# Patient Record
Sex: MALE | Race: BLACK OR AFRICAN AMERICAN | NOT HISPANIC OR LATINO | Employment: FULL TIME | URBAN - METROPOLITAN AREA
[De-identification: names, ages, dates, MRNs, and addresses within clinical notes are randomized per-mention and may not be internally consistent; named-entity substitution may affect disease eponyms.]

---

## 2019-02-15 ENCOUNTER — TRANSFERRED RECORDS (OUTPATIENT)
Dept: HEALTH INFORMATION MANAGEMENT | Facility: CLINIC | Age: 49
End: 2019-02-15

## 2019-02-25 ENCOUNTER — TRANSFERRED RECORDS (OUTPATIENT)
Dept: HEALTH INFORMATION MANAGEMENT | Facility: CLINIC | Age: 49
End: 2019-02-25

## 2019-02-26 ENCOUNTER — APPOINTMENT (OUTPATIENT)
Dept: GENERAL RADIOLOGY | Facility: CLINIC | Age: 49
End: 2019-02-26
Attending: EMERGENCY MEDICINE
Payer: COMMERCIAL

## 2019-02-26 ENCOUNTER — SURGERY (OUTPATIENT)
Age: 49
End: 2019-02-26
Payer: COMMERCIAL

## 2019-02-26 ENCOUNTER — HOSPITAL ENCOUNTER (INPATIENT)
Facility: CLINIC | Age: 49
LOS: 3 days | Discharge: HOME | End: 2019-03-01
Attending: EMERGENCY MEDICINE | Admitting: INTERNAL MEDICINE
Payer: COMMERCIAL

## 2019-02-26 ENCOUNTER — APPOINTMENT (OUTPATIENT)
Dept: GENERAL RADIOLOGY | Facility: CLINIC | Age: 49
End: 2019-02-26
Attending: PHYSICIAN ASSISTANT
Payer: COMMERCIAL

## 2019-02-26 ENCOUNTER — TRANSFERRED RECORDS (OUTPATIENT)
Dept: HEALTH INFORMATION MANAGEMENT | Facility: CLINIC | Age: 49
End: 2019-02-26

## 2019-02-26 DIAGNOSIS — I21.3 ST ELEVATION MYOCARDIAL INFARCTION (STEMI), UNSPECIFIED ARTERY (H): ICD-10-CM

## 2019-02-26 LAB
ANION GAP SERPL CALCULATED.3IONS-SCNC: 8 MMOL/L (ref 3–14)
APTT PPP: 26 SEC (ref 22–37)
BASOPHILS # BLD AUTO: 0 10E9/L (ref 0–0.2)
BASOPHILS NFR BLD AUTO: 0.1 %
BUN SERPL-MCNC: 10 MG/DL (ref 7–30)
CALCIUM SERPL-MCNC: 9.2 MG/DL (ref 8.5–10.1)
CHLORIDE SERPL-SCNC: 103 MMOL/L (ref 94–109)
CHOLEST SERPL-MCNC: 178 MG/DL
CO2 SERPL-SCNC: 26 MMOL/L (ref 20–32)
CREAT SERPL-MCNC: 1.01 MG/DL (ref 0.66–1.25)
DIFFERENTIAL METHOD BLD: ABNORMAL
EOSINOPHIL # BLD AUTO: 0.1 10E9/L (ref 0–0.7)
EOSINOPHIL NFR BLD AUTO: 0.4 %
ERYTHROCYTE [DISTWIDTH] IN BLOOD BY AUTOMATED COUNT: 15.4 % (ref 10–15)
ERYTHROCYTE [DISTWIDTH] IN BLOOD BY AUTOMATED COUNT: 15.4 % (ref 10–15)
GFR SERPL CREATININE-BSD FRML MDRD: 87 ML/MIN/{1.73_M2}
GLUCOSE SERPL-MCNC: 118 MG/DL (ref 70–99)
HCT VFR BLD AUTO: 39.7 % (ref 40–53)
HCT VFR BLD AUTO: 40.7 % (ref 40–53)
HDLC SERPL-MCNC: 58 MG/DL
HGB BLD-MCNC: 13 G/DL (ref 13.3–17.7)
HGB BLD-MCNC: 13.2 G/DL (ref 13.3–17.7)
IMM GRANULOCYTES # BLD: 0.1 10E9/L (ref 0–0.4)
IMM GRANULOCYTES NFR BLD: 0.3 %
INR PPP: 1.01 (ref 0.86–1.14)
INTERPRETATION ECG - MUSE: NORMAL
LACTATE BLD-SCNC: 5.4 MMOL/L (ref 0.7–2)
LDLC SERPL CALC-MCNC: 110 MG/DL
LMWH PPP CHRO-ACNC: 0.38 IU/ML
LYMPHOCYTES # BLD AUTO: 3.3 10E9/L (ref 0.8–5.3)
LYMPHOCYTES NFR BLD AUTO: 18.7 %
MCH RBC QN AUTO: 23.9 PG (ref 26.5–33)
MCH RBC QN AUTO: 24 PG (ref 26.5–33)
MCHC RBC AUTO-ENTMCNC: 32.4 G/DL (ref 31.5–36.5)
MCHC RBC AUTO-ENTMCNC: 32.7 G/DL (ref 31.5–36.5)
MCV RBC AUTO: 73 FL (ref 78–100)
MCV RBC AUTO: 74 FL (ref 78–100)
MONOCYTES # BLD AUTO: 0.3 10E9/L (ref 0–1.3)
MONOCYTES NFR BLD AUTO: 1.6 %
NEUTROPHILS # BLD AUTO: 13.7 10E9/L (ref 1.6–8.3)
NEUTROPHILS NFR BLD AUTO: 78.9 %
NONHDLC SERPL-MCNC: 120 MG/DL
NRBC # BLD AUTO: 0 10*3/UL
NRBC BLD AUTO-RTO: 0 /100
PLATELET # BLD AUTO: 267 10E9/L (ref 150–450)
PLATELET # BLD AUTO: 271 10E9/L (ref 150–450)
POTASSIUM SERPL-SCNC: 4.3 MMOL/L (ref 3.4–5.3)
RBC # BLD AUTO: 5.41 10E12/L (ref 4.4–5.9)
RBC # BLD AUTO: 5.52 10E12/L (ref 4.4–5.9)
SODIUM SERPL-SCNC: 137 MMOL/L (ref 133–144)
TRIGL SERPL-MCNC: 52 MG/DL
TROPONIN I SERPL-MCNC: 0.03 UG/L (ref 0–0.04)
TROPONIN I SERPL-MCNC: 119.97 UG/L (ref 0–0.04)
WBC # BLD AUTO: 17.4 10E9/L (ref 4–11)
WBC # BLD AUTO: 18.6 10E9/L (ref 4–11)

## 2019-02-26 PROCEDURE — 25000125 ZZHC RX 250: Performed by: NURSE PRACTITIONER

## 2019-02-26 PROCEDURE — C1725 CATH, TRANSLUMIN NON-LASER: HCPCS | Performed by: INTERNAL MEDICINE

## 2019-02-26 PROCEDURE — 93005 ELECTROCARDIOGRAM TRACING: CPT

## 2019-02-26 PROCEDURE — 84484 ASSAY OF TROPONIN QUANT: CPT | Performed by: INTERNAL MEDICINE

## 2019-02-26 PROCEDURE — 85018 HEMOGLOBIN: CPT | Performed by: INTERNAL MEDICINE

## 2019-02-26 PROCEDURE — 85730 THROMBOPLASTIN TIME PARTIAL: CPT | Performed by: EMERGENCY MEDICINE

## 2019-02-26 PROCEDURE — 99291 CRITICAL CARE FIRST HOUR: CPT | Mod: 25 | Performed by: INTERNAL MEDICINE

## 2019-02-26 PROCEDURE — 25000132 ZZH RX MED GY IP 250 OP 250 PS 637: Performed by: INTERNAL MEDICINE

## 2019-02-26 PROCEDURE — C1769 GUIDE WIRE: HCPCS | Performed by: INTERNAL MEDICINE

## 2019-02-26 PROCEDURE — 93454 CORONARY ARTERY ANGIO S&I: CPT | Performed by: INTERNAL MEDICINE

## 2019-02-26 PROCEDURE — 25800030 ZZH RX IP 258 OP 636: Performed by: INTERNAL MEDICINE

## 2019-02-26 PROCEDURE — 21000001 ZZH R&B HEART CARE

## 2019-02-26 PROCEDURE — 71045 X-RAY EXAM CHEST 1 VIEW: CPT

## 2019-02-26 PROCEDURE — 85347 COAGULATION TIME ACTIVATED: CPT

## 2019-02-26 PROCEDURE — 73523 X-RAY EXAM HIPS BI 5/> VIEWS: CPT

## 2019-02-26 PROCEDURE — 80061 LIPID PANEL: CPT | Performed by: INTERNAL MEDICINE

## 2019-02-26 PROCEDURE — 92920 PRQ TRLUML C ANGIOP 1ART&/BR: CPT | Performed by: INTERNAL MEDICINE

## 2019-02-26 PROCEDURE — 36415 COLL VENOUS BLD VENIPUNCTURE: CPT | Performed by: INTERNAL MEDICINE

## 2019-02-26 PROCEDURE — C1887 CATHETER, GUIDING: HCPCS | Performed by: INTERNAL MEDICINE

## 2019-02-26 PROCEDURE — 85025 COMPLETE CBC W/AUTO DIFF WBC: CPT | Performed by: EMERGENCY MEDICINE

## 2019-02-26 PROCEDURE — 27210794 ZZH OR GENERAL SUPPLY STERILE: Performed by: INTERNAL MEDICINE

## 2019-02-26 PROCEDURE — 85520 HEPARIN ASSAY: CPT | Performed by: INTERNAL MEDICINE

## 2019-02-26 PROCEDURE — 02703ZZ DILATION OF CORONARY ARTERY, ONE ARTERY, PERCUTANEOUS APPROACH: ICD-10-PCS | Performed by: INTERNAL MEDICINE

## 2019-02-26 PROCEDURE — B2111ZZ FLUOROSCOPY OF MULTIPLE CORONARY ARTERIES USING LOW OSMOLAR CONTRAST: ICD-10-PCS | Performed by: INTERNAL MEDICINE

## 2019-02-26 PROCEDURE — 99223 1ST HOSP IP/OBS HIGH 75: CPT | Mod: AI | Performed by: INTERNAL MEDICINE

## 2019-02-26 PROCEDURE — 02C03ZZ EXTIRPATION OF MATTER FROM CORONARY ARTERY, ONE ARTERY, PERCUTANEOUS APPROACH: ICD-10-PCS | Performed by: INTERNAL MEDICINE

## 2019-02-26 PROCEDURE — 85610 PROTHROMBIN TIME: CPT | Performed by: EMERGENCY MEDICINE

## 2019-02-26 PROCEDURE — 84484 ASSAY OF TROPONIN QUANT: CPT | Performed by: EMERGENCY MEDICINE

## 2019-02-26 PROCEDURE — 85027 COMPLETE CBC AUTOMATED: CPT | Performed by: INTERNAL MEDICINE

## 2019-02-26 PROCEDURE — 83605 ASSAY OF LACTIC ACID: CPT | Performed by: INTERNAL MEDICINE

## 2019-02-26 PROCEDURE — 80048 BASIC METABOLIC PNL TOTAL CA: CPT | Performed by: EMERGENCY MEDICINE

## 2019-02-26 PROCEDURE — 99152 MOD SED SAME PHYS/QHP 5/>YRS: CPT | Mod: GC | Performed by: INTERNAL MEDICINE

## 2019-02-26 PROCEDURE — 93010 ELECTROCARDIOGRAM REPORT: CPT | Performed by: INTERNAL MEDICINE

## 2019-02-26 PROCEDURE — 96374 THER/PROPH/DIAG INJ IV PUSH: CPT

## 2019-02-26 PROCEDURE — 92941 PRQ TRLML REVSC TOT OCCL AMI: CPT | Mod: LC | Performed by: INTERNAL MEDICINE

## 2019-02-26 PROCEDURE — 93454 CORONARY ARTERY ANGIO S&I: CPT | Mod: 26 | Performed by: INTERNAL MEDICINE

## 2019-02-26 PROCEDURE — 92973 PRQ TRLUML C MCHN ASP THRMBC: CPT | Performed by: INTERNAL MEDICINE

## 2019-02-26 PROCEDURE — 99285 EMERGENCY DEPT VISIT HI MDM: CPT | Mod: 25

## 2019-02-26 PROCEDURE — 25000128 H RX IP 250 OP 636: Performed by: INTERNAL MEDICINE

## 2019-02-26 PROCEDURE — 25000128 H RX IP 250 OP 636

## 2019-02-26 PROCEDURE — 25000125 ZZHC RX 250: Performed by: INTERNAL MEDICINE

## 2019-02-26 PROCEDURE — C1894 INTRO/SHEATH, NON-LASER: HCPCS | Performed by: INTERNAL MEDICINE

## 2019-02-26 PROCEDURE — 99153 MOD SED SAME PHYS/QHP EA: CPT | Performed by: INTERNAL MEDICINE

## 2019-02-26 PROCEDURE — 99152 MOD SED SAME PHYS/QHP 5/>YRS: CPT | Performed by: INTERNAL MEDICINE

## 2019-02-26 PROCEDURE — C1726 CATH, BAL DIL, NON-VASCULAR: HCPCS | Performed by: INTERNAL MEDICINE

## 2019-02-26 PROCEDURE — C1760 CLOSURE DEV, VASC: HCPCS | Performed by: INTERNAL MEDICINE

## 2019-02-26 RX ORDER — POTASSIUM CHLORIDE 29.8 MG/ML
20 INJECTION INTRAVENOUS
Status: DISCONTINUED | OUTPATIENT
Start: 2019-02-26 | End: 2019-03-01 | Stop reason: HOSPADM

## 2019-02-26 RX ORDER — POTASSIUM CHLORIDE 1.5 G/1.58G
20-40 POWDER, FOR SOLUTION ORAL
Status: DISCONTINUED | OUTPATIENT
Start: 2019-02-26 | End: 2019-03-01 | Stop reason: HOSPADM

## 2019-02-26 RX ORDER — CLOPIDOGREL BISULFATE 75 MG/1
TABLET ORAL
Status: DISCONTINUED | OUTPATIENT
Start: 2019-02-26 | End: 2019-02-26 | Stop reason: HOSPADM

## 2019-02-26 RX ORDER — HYDROMORPHONE HYDROCHLORIDE 1 MG/ML
.3-.5 INJECTION, SOLUTION INTRAMUSCULAR; INTRAVENOUS; SUBCUTANEOUS
Status: DISCONTINUED | OUTPATIENT
Start: 2019-02-26 | End: 2019-02-27

## 2019-02-26 RX ORDER — CLOPIDOGREL BISULFATE 75 MG/1
75 TABLET ORAL DAILY
Status: DISCONTINUED | OUTPATIENT
Start: 2019-02-27 | End: 2019-03-01 | Stop reason: HOSPADM

## 2019-02-26 RX ORDER — FENTANYL CITRATE 50 UG/ML
INJECTION, SOLUTION INTRAMUSCULAR; INTRAVENOUS
Status: DISCONTINUED | OUTPATIENT
Start: 2019-02-26 | End: 2019-02-26 | Stop reason: HOSPADM

## 2019-02-26 RX ORDER — MAGNESIUM SULFATE HEPTAHYDRATE 40 MG/ML
2 INJECTION, SOLUTION INTRAVENOUS ONCE
Status: COMPLETED | OUTPATIENT
Start: 2019-02-27 | End: 2019-02-27

## 2019-02-26 RX ORDER — HYDROCODONE BITARTRATE AND ACETAMINOPHEN 5; 325 MG/1; MG/1
1-2 TABLET ORAL EVERY 4 HOURS PRN
Status: DISCONTINUED | OUTPATIENT
Start: 2019-02-26 | End: 2019-02-27

## 2019-02-26 RX ORDER — SODIUM CHLORIDE 9 MG/ML
INJECTION, SOLUTION INTRAVENOUS CONTINUOUS
Status: DISCONTINUED | OUTPATIENT
Start: 2019-02-26 | End: 2019-02-28

## 2019-02-26 RX ORDER — FLUMAZENIL 0.1 MG/ML
0.2 INJECTION, SOLUTION INTRAVENOUS
Status: ACTIVE | OUTPATIENT
Start: 2019-02-26 | End: 2019-02-27

## 2019-02-26 RX ORDER — METOPROLOL SUCCINATE 25 MG/1
25 TABLET, EXTENDED RELEASE ORAL DAILY
Status: DISCONTINUED | OUTPATIENT
Start: 2019-02-27 | End: 2019-02-27

## 2019-02-26 RX ORDER — NALOXONE HYDROCHLORIDE 0.4 MG/ML
.1-.4 INJECTION, SOLUTION INTRAMUSCULAR; INTRAVENOUS; SUBCUTANEOUS
Status: DISCONTINUED | OUTPATIENT
Start: 2019-02-26 | End: 2019-02-27

## 2019-02-26 RX ORDER — HEPARIN SODIUM 1000 [USP'U]/ML
INJECTION, SOLUTION INTRAVENOUS; SUBCUTANEOUS
Status: DISCONTINUED | OUTPATIENT
Start: 2019-02-26 | End: 2019-02-26 | Stop reason: HOSPADM

## 2019-02-26 RX ORDER — CALCIUM CARBONATE 500 MG/1
1000 TABLET, CHEWABLE ORAL 4 TIMES DAILY PRN
Status: DISCONTINUED | OUTPATIENT
Start: 2019-02-26 | End: 2019-03-01 | Stop reason: HOSPADM

## 2019-02-26 RX ORDER — KETAMINE HCL IN NACL, ISO-OSM 100MG/10ML
0.2 SYRINGE (ML) INJECTION ONCE
Status: COMPLETED | OUTPATIENT
Start: 2019-02-26 | End: 2019-02-27

## 2019-02-26 RX ORDER — AMLODIPINE BESYLATE 10 MG/1
10 TABLET ORAL DAILY
Status: DISCONTINUED | OUTPATIENT
Start: 2019-02-27 | End: 2019-03-01 | Stop reason: HOSPADM

## 2019-02-26 RX ORDER — LOSARTAN POTASSIUM AND HYDROCHLOROTHIAZIDE 25; 100 MG/1; MG/1
1 TABLET ORAL DAILY
Status: DISCONTINUED | OUTPATIENT
Start: 2019-02-27 | End: 2019-03-01 | Stop reason: HOSPADM

## 2019-02-26 RX ORDER — ARGATROBAN 1 MG/ML
150 INJECTION, SOLUTION INTRAVENOUS
Status: DISCONTINUED | OUTPATIENT
Start: 2019-02-26 | End: 2019-02-26 | Stop reason: HOSPADM

## 2019-02-26 RX ORDER — AMLODIPINE BESYLATE 10 MG/1
10 TABLET ORAL DAILY
COMMUNITY
End: 2020-06-04

## 2019-02-26 RX ORDER — POLYETHYLENE GLYCOL 3350 17 G/17G
17 POWDER, FOR SOLUTION ORAL DAILY PRN
Status: DISCONTINUED | OUTPATIENT
Start: 2019-02-26 | End: 2019-03-01 | Stop reason: HOSPADM

## 2019-02-26 RX ORDER — NALOXONE HYDROCHLORIDE 0.4 MG/ML
.2-.4 INJECTION, SOLUTION INTRAMUSCULAR; INTRAVENOUS; SUBCUTANEOUS
Status: DISCONTINUED | OUTPATIENT
Start: 2019-02-26 | End: 2019-02-26

## 2019-02-26 RX ORDER — ARGATROBAN 1 MG/ML
350 INJECTION, SOLUTION INTRAVENOUS
Status: DISCONTINUED | OUTPATIENT
Start: 2019-02-26 | End: 2019-02-26 | Stop reason: HOSPADM

## 2019-02-26 RX ORDER — LIDOCAINE 40 MG/G
CREAM TOPICAL
Status: DISCONTINUED | OUTPATIENT
Start: 2019-02-26 | End: 2019-03-01 | Stop reason: HOSPADM

## 2019-02-26 RX ORDER — ASPIRIN 81 MG/1
324 TABLET, CHEWABLE ORAL ONCE
Status: DISCONTINUED | OUTPATIENT
Start: 2019-02-26 | End: 2019-02-26

## 2019-02-26 RX ORDER — DOBUTAMINE HYDROCHLORIDE 200 MG/100ML
5-40 INJECTION INTRAVENOUS CONTINUOUS PRN
Status: DISCONTINUED | OUTPATIENT
Start: 2019-02-26 | End: 2019-02-26 | Stop reason: HOSPADM

## 2019-02-26 RX ORDER — EPTIFIBATIDE 2 MG/ML
180 INJECTION, SOLUTION INTRAVENOUS EVERY 10 MIN PRN
Status: DISCONTINUED | OUTPATIENT
Start: 2019-02-26 | End: 2019-02-26 | Stop reason: HOSPADM

## 2019-02-26 RX ORDER — ONDANSETRON 4 MG/1
4 TABLET, ORALLY DISINTEGRATING ORAL EVERY 6 HOURS PRN
Status: DISCONTINUED | OUTPATIENT
Start: 2019-02-26 | End: 2019-03-01 | Stop reason: HOSPADM

## 2019-02-26 RX ORDER — NITROGLYCERIN 0.4 MG/1
0.4 TABLET SUBLINGUAL EVERY 5 MIN PRN
Status: DISCONTINUED | OUTPATIENT
Start: 2019-02-26 | End: 2019-02-26

## 2019-02-26 RX ORDER — ATROPINE SULFATE 0.1 MG/ML
0.5 INJECTION INTRAVENOUS EVERY 5 MIN PRN
Status: ACTIVE | OUTPATIENT
Start: 2019-02-26 | End: 2019-02-27

## 2019-02-26 RX ORDER — AMOXICILLIN 250 MG
1 CAPSULE ORAL 2 TIMES DAILY PRN
Status: DISCONTINUED | OUTPATIENT
Start: 2019-02-26 | End: 2019-03-01 | Stop reason: HOSPADM

## 2019-02-26 RX ORDER — BISACODYL 10 MG
10 SUPPOSITORY, RECTAL RECTAL DAILY PRN
Status: DISCONTINUED | OUTPATIENT
Start: 2019-02-26 | End: 2019-03-01 | Stop reason: HOSPADM

## 2019-02-26 RX ORDER — PROCHLORPERAZINE 25 MG
25 SUPPOSITORY, RECTAL RECTAL EVERY 12 HOURS PRN
Status: DISCONTINUED | OUTPATIENT
Start: 2019-02-26 | End: 2019-03-01 | Stop reason: HOSPADM

## 2019-02-26 RX ORDER — METOPROLOL SUCCINATE 25 MG/1
25 TABLET, EXTENDED RELEASE ORAL DAILY
Status: ON HOLD | COMMUNITY
End: 2019-03-01

## 2019-02-26 RX ORDER — DOBUTAMINE HYDROCHLORIDE 200 MG/100ML
2-20 INJECTION INTRAVENOUS CONTINUOUS PRN
Status: DISCONTINUED | OUTPATIENT
Start: 2019-02-26 | End: 2019-02-26 | Stop reason: HOSPADM

## 2019-02-26 RX ORDER — MAGNESIUM SULFATE HEPTAHYDRATE 40 MG/ML
4 INJECTION, SOLUTION INTRAVENOUS EVERY 4 HOURS PRN
Status: DISCONTINUED | OUTPATIENT
Start: 2019-02-26 | End: 2019-03-01 | Stop reason: HOSPADM

## 2019-02-26 RX ORDER — CEFAZOLIN SODIUM 2 G/100ML
2 INJECTION, SOLUTION INTRAVENOUS EVERY 8 HOURS
Status: COMPLETED | OUTPATIENT
Start: 2019-02-26 | End: 2019-02-27

## 2019-02-26 RX ORDER — FENTANYL CITRATE 50 UG/ML
25-50 INJECTION, SOLUTION INTRAMUSCULAR; INTRAVENOUS
Status: DISCONTINUED | OUTPATIENT
Start: 2019-02-26 | End: 2019-02-26

## 2019-02-26 RX ORDER — POTASSIUM CHLORIDE 7.45 MG/ML
10 INJECTION INTRAVENOUS
Status: DISCONTINUED | OUTPATIENT
Start: 2019-02-26 | End: 2019-03-01 | Stop reason: HOSPADM

## 2019-02-26 RX ORDER — MORPHINE SULFATE 2 MG/ML
INJECTION, SOLUTION INTRAMUSCULAR; INTRAVENOUS
Status: COMPLETED
Start: 2019-02-26 | End: 2019-02-26

## 2019-02-26 RX ORDER — NALOXONE HYDROCHLORIDE 0.4 MG/ML
.1-.4 INJECTION, SOLUTION INTRAMUSCULAR; INTRAVENOUS; SUBCUTANEOUS
Status: DISCONTINUED | OUTPATIENT
Start: 2019-02-26 | End: 2019-03-01 | Stop reason: HOSPADM

## 2019-02-26 RX ORDER — ADENOSINE 3 MG/ML
INJECTION, SOLUTION INTRAVENOUS
Status: DISCONTINUED | OUTPATIENT
Start: 2019-02-26 | End: 2019-02-26 | Stop reason: HOSPADM

## 2019-02-26 RX ORDER — LOSARTAN POTASSIUM AND HYDROCHLOROTHIAZIDE 25; 100 MG/1; MG/1
1 TABLET ORAL DAILY
COMMUNITY
End: 2019-03-19

## 2019-02-26 RX ORDER — DOPAMINE HYDROCHLORIDE 160 MG/100ML
2-20 INJECTION, SOLUTION INTRAVENOUS CONTINUOUS PRN
Status: DISCONTINUED | OUTPATIENT
Start: 2019-02-26 | End: 2019-02-26 | Stop reason: HOSPADM

## 2019-02-26 RX ORDER — NITROGLYCERIN 0.4 MG/1
0.4 TABLET SUBLINGUAL EVERY 5 MIN PRN
Status: DISCONTINUED | OUTPATIENT
Start: 2019-02-26 | End: 2019-03-01 | Stop reason: HOSPADM

## 2019-02-26 RX ORDER — LIDOCAINE HYDROCHLORIDE AND EPINEPHRINE 10; 10 MG/ML; UG/ML
INJECTION, SOLUTION INFILTRATION; PERINEURAL
Status: DISCONTINUED | OUTPATIENT
Start: 2019-02-26 | End: 2019-02-26 | Stop reason: HOSPADM

## 2019-02-26 RX ORDER — NITROGLYCERIN 20 MG/100ML
.07-2 INJECTION INTRAVENOUS CONTINUOUS PRN
Status: DISCONTINUED | OUTPATIENT
Start: 2019-02-26 | End: 2019-02-26 | Stop reason: HOSPADM

## 2019-02-26 RX ORDER — NITROGLYCERIN 5 MG/ML
VIAL (ML) INTRAVENOUS
Status: DISCONTINUED | OUTPATIENT
Start: 2019-02-26 | End: 2019-02-26 | Stop reason: HOSPADM

## 2019-02-26 RX ORDER — POTASSIUM CL/LIDO/0.9 % NACL 10MEQ/0.1L
10 INTRAVENOUS SOLUTION, PIGGYBACK (ML) INTRAVENOUS
Status: DISCONTINUED | OUTPATIENT
Start: 2019-02-26 | End: 2019-03-01 | Stop reason: HOSPADM

## 2019-02-26 RX ORDER — ASPIRIN 81 MG/1
81 TABLET ORAL DAILY
Status: DISCONTINUED | OUTPATIENT
Start: 2019-02-27 | End: 2019-03-01 | Stop reason: HOSPADM

## 2019-02-26 RX ORDER — POTASSIUM CHLORIDE 1500 MG/1
20-40 TABLET, EXTENDED RELEASE ORAL
Status: DISCONTINUED | OUTPATIENT
Start: 2019-02-26 | End: 2019-03-01 | Stop reason: HOSPADM

## 2019-02-26 RX ORDER — EPTIFIBATIDE 2 MG/ML
1 INJECTION, SOLUTION INTRAVENOUS CONTINUOUS PRN
Status: DISCONTINUED | OUTPATIENT
Start: 2019-02-26 | End: 2019-02-26 | Stop reason: HOSPADM

## 2019-02-26 RX ORDER — ACETAMINOPHEN 325 MG/1
325-650 TABLET ORAL EVERY 4 HOURS PRN
Status: DISCONTINUED | OUTPATIENT
Start: 2019-02-26 | End: 2019-02-26

## 2019-02-26 RX ORDER — NITROGLYCERIN 20 MG/100ML
INJECTION INTRAVENOUS
Status: COMPLETED
Start: 2019-02-26 | End: 2019-02-26

## 2019-02-26 RX ORDER — AMOXICILLIN 250 MG
2 CAPSULE ORAL 2 TIMES DAILY PRN
Status: DISCONTINUED | OUTPATIENT
Start: 2019-02-26 | End: 2019-03-01 | Stop reason: HOSPADM

## 2019-02-26 RX ORDER — SODIUM CHLORIDE 9 MG/ML
INJECTION, SOLUTION INTRAVENOUS CONTINUOUS
Status: DISCONTINUED | OUTPATIENT
Start: 2019-02-26 | End: 2019-02-26

## 2019-02-26 RX ORDER — MELOXICAM 15 MG/1
15 TABLET ORAL DAILY
Status: ON HOLD | COMMUNITY
End: 2019-03-01

## 2019-02-26 RX ORDER — CYCLOBENZAPRINE HCL 10 MG
10 TABLET ORAL 3 TIMES DAILY PRN
Status: DISCONTINUED | OUTPATIENT
Start: 2019-02-26 | End: 2019-03-01 | Stop reason: HOSPADM

## 2019-02-26 RX ORDER — ATORVASTATIN CALCIUM 40 MG/1
40 TABLET, FILM COATED ORAL EVERY EVENING
Status: DISCONTINUED | OUTPATIENT
Start: 2019-02-26 | End: 2019-03-01 | Stop reason: HOSPADM

## 2019-02-26 RX ORDER — NITROGLYCERIN 20 MG/100ML
0.07-2 INJECTION INTRAVENOUS CONTINUOUS
Status: DISCONTINUED | OUTPATIENT
Start: 2019-02-26 | End: 2019-02-28

## 2019-02-26 RX ORDER — NOREPINEPHRINE BITARTRATE/D5W 16MG/250ML
.03-.4 PLASTIC BAG, INJECTION (ML) INTRAVENOUS CONTINUOUS PRN
Status: DISCONTINUED | OUTPATIENT
Start: 2019-02-26 | End: 2019-02-26 | Stop reason: HOSPADM

## 2019-02-26 RX ORDER — ONDANSETRON 2 MG/ML
4 INJECTION INTRAMUSCULAR; INTRAVENOUS EVERY 6 HOURS PRN
Status: DISCONTINUED | OUTPATIENT
Start: 2019-02-26 | End: 2019-03-01 | Stop reason: HOSPADM

## 2019-02-26 RX ORDER — ACETAMINOPHEN 325 MG/1
650 TABLET ORAL EVERY 4 HOURS PRN
Status: DISCONTINUED | OUTPATIENT
Start: 2019-02-26 | End: 2019-03-01 | Stop reason: HOSPADM

## 2019-02-26 RX ORDER — PROCHLORPERAZINE MALEATE 5 MG
10 TABLET ORAL EVERY 6 HOURS PRN
Status: DISCONTINUED | OUTPATIENT
Start: 2019-02-26 | End: 2019-03-01 | Stop reason: HOSPADM

## 2019-02-26 RX ORDER — MORPHINE SULFATE 2 MG/ML
1-2 INJECTION, SOLUTION INTRAMUSCULAR; INTRAVENOUS EVERY 4 HOURS PRN
Status: DISCONTINUED | OUTPATIENT
Start: 2019-02-26 | End: 2019-02-26

## 2019-02-26 RX ADMIN — HEPARIN SODIUM 7000 UNITS: 1000 INJECTION, SOLUTION INTRAVENOUS; SUBCUTANEOUS at 13:27

## 2019-02-26 RX ADMIN — HYDROCODONE BITARTRATE AND ACETAMINOPHEN 2 TABLET: 5; 325 TABLET ORAL at 16:46

## 2019-02-26 RX ADMIN — HYDROCODONE BITARTRATE AND ACETAMINOPHEN 2 TABLET: 5; 325 TABLET ORAL at 20:47

## 2019-02-26 RX ADMIN — NITROGLYCERIN 0.3 MCG/KG/MIN: 20 INJECTION INTRAVENOUS at 13:10

## 2019-02-26 RX ADMIN — NITROGLYCERIN 200 MCG: 5 INJECTION, SOLUTION INTRAVENOUS at 13:48

## 2019-02-26 RX ADMIN — CLOPIDOGREL BISULFATE 600 MG: 75 TABLET ORAL at 13:55

## 2019-02-26 RX ADMIN — LIDOCAINE HYDROCHLORIDE,EPINEPHRINE BITARTRATE 10 ML: 10; .01 INJECTION, SOLUTION INFILTRATION; PERINEURAL at 13:24

## 2019-02-26 RX ADMIN — Medication 15 MG: at 23:55

## 2019-02-26 RX ADMIN — SODIUM CHLORIDE: 9 INJECTION, SOLUTION INTRAVENOUS at 15:17

## 2019-02-26 RX ADMIN — CEFAZOLIN SODIUM 2 G: 2 INJECTION, SOLUTION INTRAVENOUS at 17:31

## 2019-02-26 RX ADMIN — MORPHINE SULFATE 1 MG: 2 INJECTION, SOLUTION INTRAMUSCULAR; INTRAVENOUS at 16:05

## 2019-02-26 RX ADMIN — MORPHINE SULFATE 2 MG: 2 INJECTION, SOLUTION INTRAMUSCULAR; INTRAVENOUS at 15:17

## 2019-02-26 RX ADMIN — ATORVASTATIN CALCIUM 40 MG: 40 TABLET, FILM COATED ORAL at 20:46

## 2019-02-26 RX ADMIN — FENTANYL CITRATE 50 MCG: 50 INJECTION, SOLUTION INTRAMUSCULAR; INTRAVENOUS at 13:40

## 2019-02-26 RX ADMIN — HEPARIN SODIUM 850 UNITS/HR: 10000 INJECTION, SOLUTION INTRAVENOUS at 15:54

## 2019-02-26 RX ADMIN — ACETAMINOPHEN 650 MG: 325 TABLET, FILM COATED ORAL at 23:53

## 2019-02-26 RX ADMIN — MIDAZOLAM 1 MG: 1 INJECTION INTRAMUSCULAR; INTRAVENOUS at 13:25

## 2019-02-26 RX ADMIN — Medication 0.3 MG: at 21:50

## 2019-02-26 RX ADMIN — MIDAZOLAM 1 MG: 1 INJECTION INTRAMUSCULAR; INTRAVENOUS at 13:40

## 2019-02-26 RX ADMIN — FENTANYL CITRATE 50 MCG: 50 INJECTION, SOLUTION INTRAMUSCULAR; INTRAVENOUS at 13:25

## 2019-02-26 RX ADMIN — ADENOSINE 72 MCG: 3 INJECTION, SOLUTION INTRAVENOUS at 13:46

## 2019-02-26 ASSESSMENT — ENCOUNTER SYMPTOMS: NUMBNESS: 1

## 2019-02-26 ASSESSMENT — MIFFLIN-ST. JEOR: SCORE: 1556.63

## 2019-02-26 NOTE — ED NOTES
Bed: ST01  Expected date:   Expected time:   Means of arrival:   Comments:  pablo - 516 - 48 M STEMI eta 1257

## 2019-02-26 NOTE — Clinical Note
The first balloon was inserted into the circumflex and distal circumflex.  Max pressure = 6 yin. Total duration = 46 seconds.

## 2019-02-26 NOTE — CONSULTS
Consult received    Patient transferred immediately post-op from primary CASSANDRA at [facility] (unknown approach or surgeon at this time)    Will order post-operative x-rays   Orders placed for WBAT with walker once xray returned  Hip precautions  Compression stockings    Flor Estrella PAC

## 2019-02-26 NOTE — PHARMACY-ADMISSION MEDICATION HISTORY
Admission medication history interview status for the 2/26/2019  admission is complete. See EPIC admission navigator for prior to admission medications     Medication history source reliability:Moderate    Actions taken by pharmacist (provider contacted, etc):called  Rx @[phone]     Additional medication history information not noted on PTA med list :his med list    Medication reconciliation/reorder completed by provider prior to medication history? No    Time spent in this activity: 25min    Prior to Admission medications    Medication Sig Last Dose Taking? Auth Provider   amLODIPine (NORVASC) 10 MG tablet Take 10 mg by mouth daily 2/26/2019 at Unknown time Yes Unknown, Entered By History   losartan-hydrochlorothiazide (HYZAAR) 100-25 MG tablet Take 1 tablet by mouth daily 2/26/2019 at Unknown time Yes Unknown, Entered By History   meloxicam (MOBIC) 15 MG tablet Take 15 mg by mouth daily 2/26/2019 at Unknown time Yes Unknown, Entered By History   metoprolol succinate ER (TOPROL-XL) 25 MG 24 hr tablet Take 25 mg by mouth daily 2/26/2019 at Unknown time Yes Unknown, Entered By History

## 2019-02-26 NOTE — H&P
History and Physical     Ross Smith MRN# [id number]   YOB: 1970 Age: 48 year old      Date of Admission:  2/26/2019    Primary care provider: [facility]          Assessment and Plan:   This is a  48 year old male admitted with STEMI, probably due to thromboembolism.    1.  STEMI, probably due to thromboembolism.  Patient is admitted to the CCU for close hemodynamic monitoring.  Cardiology has been consulted.  Continue aspirin and Plavix.  Resume heparin drip.  Start Lipitor.  Echocardiogram is pending ordered for the morning.  Consult physical and occupational therapy for cardiac rehab.  There is concern for embolic phenomenon.  Patient will require Holter monitor at discharge to look for atrial fibrillation.    2.  Left hip arthroplasty.  Consult orthopedic surgery for assistance in management.    3.  Hypertension.  Resume metoprolol, Hyzaar, and Norvasc.          Chief Complaint:   This patient is a 48 year old male who presents with chest pain.    History is obtained from the patient         History of Present Illness:   This morning, patient was undergoing a left hip arthroplasty at [facility] when he became hypotensive and tachycardic.  The patient's anesthesia was lightened chest pressure.  ECG demonstrated ST elevation MI.  The patient was transported emergently to [facility] emergency department.  The patient was evaluated by cardiology and emergently taken to the Cath Lab.  Coronary angiography demonstrated single-vessel disease involving the second obtuse marginal.  There was no evidence for ruptured plaque and lesion in the appearance of an embolic phenomenon.  Balloon angioplasty followed by aspiration thrombectomy was performed with return of flow.  There was distal embolization to the inferior pole of OM2.  Post procedure, patient was brought to the CCU.  He currently is resting in bed.  He denies chest pain or shortness of breath.  He complains of left hip pain  and inability urinate.             Past Medical History:     Past Medical History:   Diagnosis Date     Arthritis      ETOH abuse      Hypertension              Past Surgical History:   History reviewed. No pertinent surgical history.   Hypertension  Chronic insomnia  Cannabis use  Prediabetes  Vitamin D deficiency  Renal cyst  Alcohol abuse  Osteoarthritis  Microcytic anemia  Left inguinal hernia repair          Social History:     Social History     Tobacco Use     Smoking status: Not on file   Substance Use Topics     Alcohol use: Yes             Family History:   Family history reviewed and is noncontributory other than noted in HPI          Immunizations:     There is no immunization history on file for this patient.         Allergies:   No Known Allergies          Medications:     Prior to Admission medications    Medication Sig Start Date End Date Taking? Authorizing Provider   amLODIPine (NORVASC) 10 MG tablet Take 10 mg by mouth daily   Yes Unknown, Entered By History   losartan-hydrochlorothiazide (HYZAAR) 100-25 MG tablet Take 1 tablet by mouth daily   Yes Unknown, Entered By History   meloxicam (MOBIC) 15 MG tablet Take 15 mg by mouth daily   Yes Unknown, Entered By History   metoprolol succinate ER (TOPROL-XL) 25 MG 24 hr tablet Take 25 mg by mouth daily   Yes Unknown, Entered By History            Review of Systems:   The 10 point Review of Systems performed and is negative other than noted in the HPI           Physical Exam:   Vitals were reviewed  Temp: 97.2  F (36.2  C) Temp src: Oral BP: (!) 144/107 Pulse: 88 Heart Rate: 79 Resp: 10 SpO2: 100 % O2 Device: Nasal cannula Oxygen Delivery: 3 LPM    This is a well nourished well developed male resting comfortably in bed, somnolent, no acute distress  Head: atraumatic normocephalic, sclera noninjected and anicterric, oral mucosa moist without lesions or exudates.  Neck: supple without spinal abnormality  Chest: clear to auscultation bilaterally, without  wheezes, rhonchi, or rales.  Cardiovascular: regular rate and rhythm, no murmurs, gallops, or rubs, no edema  Abdomen: bowel sounds normal, nontender and nondistended, no hepatosplenomegaly or masses.  Musculoskeletal: normal muscle mass and tone  Skin: no rashes  Lymph: no lymphadenopathy.  Neuro: cranial nerves II-XII intact, strength in all four extremities normal, reflexes normal, coordination normal.         Data:   Data reviewed today: I reviewed all medications, new labs and imaging results over the last 24 hours. I personally reviewed the EKG tracing showing NSR and the chest x-ray image(s) showing clear lungs.    Recent Labs   Lab 02/26/19  1436 02/26/19  1302   WBC 18.6* 17.4*   HGB 13.0* 13.2*   MCV 73* 74*    267   INR  --  1.01   NA  --  137   POTASSIUM  --  4.3   CHLORIDE  --  103   CO2  --  26   BUN  --  10   CR  --  1.01   ANIONGAP  --  8   ROBINSON  --  9.2   GLC  --  118*   TROPI  --  0.032     Recent Results (from the past 24 hour(s))   XR Chest Port 1 View    Narrative    XR CHEST PORT 1 VW 2/26/2019 1:05 PM    COMPARISON: None.    HISTORY: ST elevation myocardial infarction.      Impression    IMPRESSION: Platelike atelectasis laterally in the RIGHT midlung and  RIGHT base. Lungs are otherwise clear. No pleural effusion or  pneumothorax seen on either side. RIGHT axilla within normal limits.    SANDI SIMON MD

## 2019-02-26 NOTE — Clinical Note
The first balloon was inserted into the circumflex and distal circumflex.  Max pressure = 6 yin. Total duration = 15 seconds.     Max pressure = 6 yin. Total duration = 20 seconds.    Balloon reinflated a second time: Max pressure = 6 yin. Total duration = 20 seconds.  Balloon reinflated a third time: Max pressure = 4 yin. Total duration = 15 seconds.  Balloon reinflated a fourth time: Max pressure = 6 yin. Total duration = 30 seconds.  Balloon reinflated a fourth time:

## 2019-02-26 NOTE — ED PROVIDER NOTES
"  History     Chief Complaint:  Chest Pain    HPI   Ross Smith is a 48 year old male with a history of hypertension, hyperlipidemia and alcohol abuse who presents via EMS with chest pain. Per EMS, the patient was undergoing a left hip arthoplasty at [facility] when his pressure bottomed out and he became tachycardic. The patient's anesthesia was lightened and upon coming out of the anesthesia the patient started complaining of chest pressure. Their 12-leads reportedly demonstrated a STEMI so EMS was called to transport the patient to the ED. He was given 325 mg Aspirin, Morphine and nitroglycerin around 12:33 pm (about 30 minutes ago) at [facility]. He was given another nitroglycerin by EMS. The patient also complains of some tingling in his toes.    CARDIAC RISK FACTORS:  Sex:    Male   Tobacco:   Yes  Hypertension:   Yes  Hyperlipidemia:  Yes  Diabetes:   No  Family History:  No    Allergies:  No known drug allergies    Medications:    Hydrochlorothiazide     Past Medical History:    Arthritis  Alcohol abuse  Hypertension    Past Surgical History:    Hernia repair  Left hip arthroplasty     Family History:    History reviewed. No pertinent family history.    Social History:  The patient presents to the ED via EMS.   Marital status: Single  Smoking status: Yes; Cigars  Alcohol use: Yes  Drug use: Yes; Marijuana    Review of Systems   Cardiovascular: Positive for chest pain.   Neurological: Positive for numbness (tingling in toes).   All other systems reviewed and are negative.    Physical Exam     Patient Vitals for the past 24 hrs:   BP Temp Temp src Pulse Heart Rate Resp SpO2 Height Weight   02/26/19 1415 (!) 149/102 97.2  F (36.2  C) Oral 87 84 9 91 % -- --   02/26/19 1302 -- 97.3  F (36.3  C) Oral -- 71 12 97 % -- --   02/26/19 1301 (!) 164/122 -- -- -- 73 20 98 % 1.702 m (5' 7\") 72.8 kg (160 lb 7.9 oz)   02/26/19 1259 (!) 164/122 -- -- 73 -- 26 96 % -- --       Physical Exam  Eye:  Pupils are equal, round, " and reactive.  Extraocular movements intact.    ENT:  No rhinorrhea.  Moist mucus membranes.  Normal tongue and tonsil.    Cardiac:  Regular rate and rhythm.  No murmurs, gallops, or rubs.    Pulmonary:  Clear to auscultation bilaterally.  No wheezes, rales, or rhonchi.    Abdomen:  Positive bowel sounds.  Abdomen is soft and non-distended, without focal tenderness.    Musculoskeletal:  Normal movement of all extremities without evidence for deficit.    Skin:  Warm and dry without rashes.    Neurologic:  Non-focal exam without asymmetric weakness or numbness.     Psychiatric:  Normal affect with appropriate interaction with examiner.    Emergency Department Course   ECG (12:59:21):  Rate 76 bpm. AK interval 156. QRS duration 88. QT/QTc 402/452. P-R-T axes 29 38 86. Normal sinus rhythm. ST elevation, consider inferior injury or acute infarct.  ACUTE MI/STEMI. Consider right ventricular involvement in acute inferior infarct. Abnormal ECG. Interpreted at 1301 by Trierweiler, Chad A, MD.    Imaging:  Radiographic findings were communicated with the patient who voiced understanding of the findings.    CHEST XR, 1 VIEW PORTABLE:  IMPRESSION: Platelike atelectasis laterally in the RIGHT midlung and  RIGHT base. Lungs are otherwise clear. No pleural effusion or  pneumothorax seen on either side. RIGHT axilla within normal limits.  As read by Radiology.    Laboratory:  CBC: WBC 17.4 (H), HGB 13.2 (L), o/w WNL ()  BMP: Glucose 118 (H), o/w WNL (Creatinine 1.01)  Troponin: 0.032  PTT: 26  INR: 1.01    Interventions:  1310: Nitroglycerin 50 mg IV    Emergency Department Course:  1256: The patient arrived in the emergency department via EMS.    Past medical records, nursing notes, and vitals reviewed.  1258: I performed an exam of the patient and obtained history, as documented above.     1259: I spoke to Dr. Henry of cardiology regarding the patient.    1259: EKG was obtained, results above.    1303: The patient had a  portable chest x-ray performed, findings above.    1305: I updated the patient's family now present at bedside.    1310: IV inserted and blood samples were collected and sent for laboratory testing, findings above.    Findings and plan explained to the patient and family who consent to transfer to the cath lab.     The patient was transferred to the cath lab in the care of Dr. Henry for further monitoring, evaluation, and treatment.      Impression & Plan      CMS Diagnoses: The patient has a STEMI     The patient was evaluated at 1258   Patient was transferred  to the cath lab which was activated due to ECG changes.   ASA prior to presentation.    Medical Decision Making:  This 48-year-old man presents with a perioperative myocardial infarction.  Per paramedic report, he was undergoing a left total hip arthroplasty when he had a sudden drop in blood pressure and a change in his cardiac monitor with EKG showing evidence of an inferior myocardial infarction with ST elevations.  The operation was quickly finished and the patient was transferred to our hospital from a outpatient surgery center.  On exam, the patient is hypertensive and complaining of substernal chest pain.  EKG was quickly obtained and clearly shows evidence of this ST elevation myocardial infarction.  The Cath Lab was already activated per EMS.  I spoke with Dr. Henry who recommended holding off on anticoagulation with heparin or Brilinta in the short-term.  Otherwise, chest x-ray does not show evidence of widening of the mediastinum and with his EKG findings and chest pressure, I feel this is unlikely to represent pulmonary embolism.  The patient was quickly moved to the cardiac catheterization suite for more definitive management.  Family was updated on the situation and questions were answered.    Critical Care time:  was 15 minutes for this patient excluding procedures.    Diagnosis:    ICD-10-CM   1. ST elevation myocardial infarction (STEMI),  unspecified artery (H) I21.3       Disposition:  Transferred to the catheter lab in the care of Dr. Elaine Moseley  2/26/2019    EMERGENCY DEPARTMENT  I, Arlene Moseley, am serving as a scribe at 12:58 PM on 2/26/2019 to document services personally performed by Trierweiler, Chad A, MD based on my observations and the provider's statements to me.        Trierweiler, Chad A, MD  02/26/19 2151

## 2019-02-26 NOTE — PROGRESS NOTES
Patient seen after the coronary angiography.  He had ST elevation microinfarction with occlusion of circumflex that was intervened with aspiration thrombectomy and angioplasty.  No stent placed.  This appears to be embolic.  Could be related to recent hip surgery or could have a cardiac source of emboli.  We will do a transesophageal echo tomorrow to rule out LA appendage clot.The risks and benefits of HALIMA has been discussed with the patient and/or relatives in detail include approximate 1 in 5000 risk of serious complications including rare risk of death, esophageal tear, pharyngeal hematoma, methemoglobinemia, GI bleed etc. The patient is willing to proceed with it.  Hospitalist to follow.  Presently he is on aspirin and Plavix.  He is also on IV heparin, to be continued to be rule out a clot.  He may benefit from outpatient event monitor to rule out atrial fibrillation.  Will need pain control.

## 2019-02-27 ENCOUNTER — APPOINTMENT (OUTPATIENT)
Dept: CARDIOLOGY | Facility: CLINIC | Age: 49
End: 2019-02-27
Attending: INTERNAL MEDICINE
Payer: COMMERCIAL

## 2019-02-27 LAB
ABO + RH BLD: NORMAL
ABO + RH BLD: NORMAL
ANION GAP SERPL CALCULATED.3IONS-SCNC: 6 MMOL/L (ref 3–14)
BASOPHILS # BLD AUTO: 0 10E9/L (ref 0–0.2)
BASOPHILS NFR BLD AUTO: 0.1 %
BLD GP AB SCN SERPL QL: NORMAL
BLD PROD TYP BPU: NORMAL
BLD PROD TYP BPU: NORMAL
BLD UNIT ID BPU: 0
BLOOD BANK CMNT PATIENT-IMP: NORMAL
BLOOD PRODUCT CODE: NORMAL
BPU ID: NORMAL
BUN SERPL-MCNC: 12 MG/DL (ref 7–30)
CALCIUM SERPL-MCNC: 8.3 MG/DL (ref 8.5–10.1)
CHLORIDE SERPL-SCNC: 107 MMOL/L (ref 94–109)
CO2 SERPL-SCNC: 24 MMOL/L (ref 20–32)
CREAT SERPL-MCNC: 0.86 MG/DL (ref 0.66–1.25)
DIFFERENTIAL METHOD BLD: ABNORMAL
EOSINOPHIL # BLD AUTO: 0 10E9/L (ref 0–0.7)
EOSINOPHIL NFR BLD AUTO: 0 %
ERYTHROCYTE [DISTWIDTH] IN BLOOD BY AUTOMATED COUNT: 15.1 % (ref 10–15)
GFR SERPL CREATININE-BSD FRML MDRD: >90 ML/MIN/{1.73_M2}
GLUCOSE SERPL-MCNC: 125 MG/DL (ref 70–99)
HBA1C MFR BLD: 5.9 % (ref 0–5.6)
HCT VFR BLD AUTO: 28.4 % (ref 40–53)
HGB BLD-MCNC: 7.9 G/DL (ref 13.3–17.7)
HGB BLD-MCNC: 9.4 G/DL (ref 13.3–17.7)
HGB BLD-MCNC: 9.8 G/DL (ref 13.3–17.7)
IMM GRANULOCYTES # BLD: 0 10E9/L (ref 0–0.4)
IMM GRANULOCYTES NFR BLD: 0.4 %
KCT BLD-ACNC: 237 SEC (ref 75–150)
LACTATE BLD-SCNC: 1.8 MMOL/L (ref 0.7–2)
LMWH PPP CHRO-ACNC: 0.19 IU/ML
LYMPHOCYTES # BLD AUTO: 2.1 10E9/L (ref 0.8–5.3)
LYMPHOCYTES NFR BLD AUTO: 19.1 %
MAGNESIUM SERPL-MCNC: 2.5 MG/DL (ref 1.6–2.3)
MCH RBC QN AUTO: 23.9 PG (ref 26.5–33)
MCHC RBC AUTO-ENTMCNC: 33.1 G/DL (ref 31.5–36.5)
MCV RBC AUTO: 72 FL (ref 78–100)
MONOCYTES # BLD AUTO: 1.4 10E9/L (ref 0–1.3)
MONOCYTES NFR BLD AUTO: 12.5 %
NEUTROPHILS # BLD AUTO: 7.6 10E9/L (ref 1.6–8.3)
NEUTROPHILS NFR BLD AUTO: 67.9 %
NRBC # BLD AUTO: 0 10*3/UL
NRBC BLD AUTO-RTO: 0 /100
NUM BPU REQUESTED: 1
PLATELET # BLD AUTO: 231 10E9/L (ref 150–450)
POTASSIUM SERPL-SCNC: 3.9 MMOL/L (ref 3.4–5.3)
RBC # BLD AUTO: 3.94 10E12/L (ref 4.4–5.9)
SODIUM SERPL-SCNC: 137 MMOL/L (ref 133–144)
SPECIMEN EXP DATE BLD: NORMAL
TRANSFUSION STATUS PATIENT QL: NORMAL
TRANSFUSION STATUS PATIENT QL: NORMAL
TROPONIN I SERPL-MCNC: 112.94 UG/L (ref 0–0.04)
TROPONIN I SERPL-MCNC: 90.91 UG/L (ref 0–0.04)
WBC # BLD AUTO: 11.1 10E9/L (ref 4–11)

## 2019-02-27 PROCEDURE — 21000001 ZZH R&B HEART CARE

## 2019-02-27 PROCEDURE — 25000128 H RX IP 250 OP 636: Performed by: INTERNAL MEDICINE

## 2019-02-27 PROCEDURE — 86900 BLOOD TYPING SEROLOGIC ABO: CPT | Performed by: NURSE PRACTITIONER

## 2019-02-27 PROCEDURE — 40000264 ECHOCARDIOGRAM COMPLETE

## 2019-02-27 PROCEDURE — 93320 DOPPLER ECHO COMPLETE: CPT | Mod: 74 | Performed by: INTERNAL MEDICINE

## 2019-02-27 PROCEDURE — 36415 COLL VENOUS BLD VENIPUNCTURE: CPT | Performed by: NURSE PRACTITIONER

## 2019-02-27 PROCEDURE — 25000132 ZZH RX MED GY IP 250 OP 250 PS 637: Performed by: PHYSICIAN ASSISTANT

## 2019-02-27 PROCEDURE — 36415 COLL VENOUS BLD VENIPUNCTURE: CPT | Performed by: INTERNAL MEDICINE

## 2019-02-27 PROCEDURE — 86850 RBC ANTIBODY SCREEN: CPT | Performed by: NURSE PRACTITIONER

## 2019-02-27 PROCEDURE — 93312 ECHO TRANSESOPHAGEAL: CPT | Mod: 74 | Performed by: INTERNAL MEDICINE

## 2019-02-27 PROCEDURE — P9016 RBC LEUKOCYTES REDUCED: HCPCS | Performed by: NURSE PRACTITIONER

## 2019-02-27 PROCEDURE — 99233 SBSQ HOSP IP/OBS HIGH 50: CPT | Performed by: INTERNAL MEDICINE

## 2019-02-27 PROCEDURE — 93306 TTE W/DOPPLER COMPLETE: CPT | Mod: 26 | Performed by: INTERNAL MEDICINE

## 2019-02-27 PROCEDURE — 86901 BLOOD TYPING SEROLOGIC RH(D): CPT | Performed by: NURSE PRACTITIONER

## 2019-02-27 PROCEDURE — 86923 COMPATIBILITY TEST ELECTRIC: CPT | Performed by: NURSE PRACTITIONER

## 2019-02-27 PROCEDURE — 25000128 H RX IP 250 OP 636: Performed by: PHYSICIAN ASSISTANT

## 2019-02-27 PROCEDURE — 83605 ASSAY OF LACTIC ACID: CPT | Performed by: INTERNAL MEDICINE

## 2019-02-27 PROCEDURE — 25000132 ZZH RX MED GY IP 250 OP 250 PS 637: Performed by: INTERNAL MEDICINE

## 2019-02-27 PROCEDURE — 83735 ASSAY OF MAGNESIUM: CPT | Performed by: INTERNAL MEDICINE

## 2019-02-27 PROCEDURE — 93325 DOPPLER ECHO COLOR FLOW MAPG: CPT | Mod: 74 | Performed by: INTERNAL MEDICINE

## 2019-02-27 PROCEDURE — 85025 COMPLETE CBC W/AUTO DIFF WBC: CPT | Performed by: INTERNAL MEDICINE

## 2019-02-27 PROCEDURE — 25000125 ZZHC RX 250: Performed by: INTERNAL MEDICINE

## 2019-02-27 PROCEDURE — 25000128 H RX IP 250 OP 636: Performed by: NURSE PRACTITIONER

## 2019-02-27 PROCEDURE — 99232 SBSQ HOSP IP/OBS MODERATE 35: CPT | Mod: 25 | Performed by: INTERNAL MEDICINE

## 2019-02-27 PROCEDURE — 85018 HEMOGLOBIN: CPT | Performed by: INTERNAL MEDICINE

## 2019-02-27 PROCEDURE — 40000857 ZZH STATISTIC TEE INCLUDES SEDATION

## 2019-02-27 PROCEDURE — 83036 HEMOGLOBIN GLYCOSYLATED A1C: CPT | Performed by: INTERNAL MEDICINE

## 2019-02-27 PROCEDURE — 84484 ASSAY OF TROPONIN QUANT: CPT | Performed by: INTERNAL MEDICINE

## 2019-02-27 PROCEDURE — 25500064 ZZH RX 255 OP 636: Performed by: INTERNAL MEDICINE

## 2019-02-27 PROCEDURE — 93320 DOPPLER ECHO COMPLETE: CPT | Mod: 74

## 2019-02-27 PROCEDURE — 93005 ELECTROCARDIOGRAM TRACING: CPT

## 2019-02-27 PROCEDURE — 93010 ELECTROCARDIOGRAM REPORT: CPT | Performed by: INTERNAL MEDICINE

## 2019-02-27 PROCEDURE — 85520 HEPARIN ASSAY: CPT | Performed by: INTERNAL MEDICINE

## 2019-02-27 PROCEDURE — 80048 BASIC METABOLIC PNL TOTAL CA: CPT | Performed by: INTERNAL MEDICINE

## 2019-02-27 PROCEDURE — 25800030 ZZH RX IP 258 OP 636: Performed by: INTERNAL MEDICINE

## 2019-02-27 RX ORDER — DEXTROSE MONOHYDRATE 25 G/50ML
9.5 INJECTION, SOLUTION INTRAVENOUS
Status: DISCONTINUED | OUTPATIENT
Start: 2019-02-27 | End: 2019-03-01

## 2019-02-27 RX ORDER — OXYCODONE HYDROCHLORIDE 5 MG/1
5-10 TABLET ORAL EVERY 4 HOURS PRN
Status: DISCONTINUED | OUTPATIENT
Start: 2019-02-27 | End: 2019-03-01 | Stop reason: HOSPADM

## 2019-02-27 RX ORDER — HYDROXYZINE HYDROCHLORIDE 10 MG/1
10 TABLET, FILM COATED ORAL 3 TIMES DAILY PRN
Status: DISCONTINUED | OUTPATIENT
Start: 2019-02-27 | End: 2019-03-01 | Stop reason: HOSPADM

## 2019-02-27 RX ORDER — METHOCARBAMOL 500 MG/1
500 TABLET, FILM COATED ORAL 4 TIMES DAILY PRN
Status: DISCONTINUED | OUTPATIENT
Start: 2019-02-27 | End: 2019-03-01 | Stop reason: HOSPADM

## 2019-02-27 RX ORDER — HYDROMORPHONE HYDROCHLORIDE 1 MG/ML
.5-1 INJECTION, SOLUTION INTRAMUSCULAR; INTRAVENOUS; SUBCUTANEOUS
Status: DISCONTINUED | OUTPATIENT
Start: 2019-02-27 | End: 2019-03-01 | Stop reason: HOSPADM

## 2019-02-27 RX ORDER — NALOXONE HYDROCHLORIDE 0.4 MG/ML
.1-.4 INJECTION, SOLUTION INTRAMUSCULAR; INTRAVENOUS; SUBCUTANEOUS
Status: DISCONTINUED | OUTPATIENT
Start: 2019-02-27 | End: 2019-02-27

## 2019-02-27 RX ORDER — ACETAMINOPHEN 500 MG
1000 TABLET ORAL EVERY 8 HOURS
Status: DISCONTINUED | OUTPATIENT
Start: 2019-02-27 | End: 2019-03-01 | Stop reason: HOSPADM

## 2019-02-27 RX ORDER — FENTANYL CITRATE 50 UG/ML
25-50 INJECTION, SOLUTION INTRAMUSCULAR; INTRAVENOUS
Status: ACTIVE | OUTPATIENT
Start: 2019-02-27 | End: 2019-02-28

## 2019-02-27 RX ORDER — KETOROLAC TROMETHAMINE 15 MG/ML
30 INJECTION, SOLUTION INTRAMUSCULAR; INTRAVENOUS EVERY 6 HOURS PRN
Status: COMPLETED | OUTPATIENT
Start: 2019-02-27 | End: 2019-03-01

## 2019-02-27 RX ORDER — DIAZEPAM 5 MG
5 TABLET ORAL
Status: CANCELLED | OUTPATIENT
Start: 2019-02-27

## 2019-02-27 RX ORDER — FLUMAZENIL 0.1 MG/ML
0.2 INJECTION, SOLUTION INTRAVENOUS
Status: DISCONTINUED | OUTPATIENT
Start: 2019-02-27 | End: 2019-03-01

## 2019-02-27 RX ORDER — LIDOCAINE HYDROCHLORIDE 40 MG/ML
1.5 SOLUTION TOPICAL ONCE
Status: COMPLETED | OUTPATIENT
Start: 2019-02-27 | End: 2019-02-27

## 2019-02-27 RX ORDER — LIDOCAINE 40 MG/G
CREAM TOPICAL
Status: DISCONTINUED | OUTPATIENT
Start: 2019-02-27 | End: 2019-02-27

## 2019-02-27 RX ORDER — SODIUM CHLORIDE 9 MG/ML
INJECTION, SOLUTION INTRAVENOUS CONTINUOUS PRN
Status: DISCONTINUED | OUTPATIENT
Start: 2019-02-27 | End: 2019-03-01

## 2019-02-27 RX ORDER — FENTANYL CITRATE 50 UG/ML
50-100 INJECTION, SOLUTION INTRAMUSCULAR; INTRAVENOUS
Status: COMPLETED | OUTPATIENT
Start: 2019-02-27 | End: 2019-02-27

## 2019-02-27 RX ORDER — METOPROLOL SUCCINATE 50 MG/1
50 TABLET, EXTENDED RELEASE ORAL DAILY
Status: DISCONTINUED | OUTPATIENT
Start: 2019-02-28 | End: 2019-03-01 | Stop reason: HOSPADM

## 2019-02-27 RX ORDER — GLYCOPYRROLATE 0.2 MG/ML
0.1 INJECTION, SOLUTION INTRAMUSCULAR; INTRAVENOUS ONCE
Status: COMPLETED | OUTPATIENT
Start: 2019-02-27 | End: 2019-02-27

## 2019-02-27 RX ADMIN — AMLODIPINE BESYLATE 10 MG: 10 TABLET ORAL at 12:54

## 2019-02-27 RX ADMIN — GLYCOPYRROLATE 0.1 MG: 0.2 INJECTION, SOLUTION INTRAMUSCULAR; INTRAVENOUS at 09:10

## 2019-02-27 RX ADMIN — OXYCODONE HYDROCHLORIDE 10 MG: 5 TABLET ORAL at 20:07

## 2019-02-27 RX ADMIN — TOPICAL ANESTHETIC 1 ML: 200 SPRAY DENTAL; PERIODONTAL at 09:13

## 2019-02-27 RX ADMIN — STANDARDIZED SENNA CONCENTRATE AND DOCUSATE SODIUM 2 TABLET: 8.6; 5 TABLET, FILM COATED ORAL at 13:06

## 2019-02-27 RX ADMIN — ATORVASTATIN CALCIUM 40 MG: 40 TABLET, FILM COATED ORAL at 22:13

## 2019-02-27 RX ADMIN — ACETAMINOPHEN 1000 MG: 500 TABLET, FILM COATED ORAL at 22:13

## 2019-02-27 RX ADMIN — KETOROLAC TROMETHAMINE 30 MG: 15 INJECTION, SOLUTION INTRAMUSCULAR; INTRAVENOUS at 20:07

## 2019-02-27 RX ADMIN — Medication 0.3 MG: at 00:29

## 2019-02-27 RX ADMIN — Medication 0.5 MG: at 02:38

## 2019-02-27 RX ADMIN — HYDROMORPHONE HYDROCHLORIDE 1 MG: 1 INJECTION, SOLUTION INTRAMUSCULAR; INTRAVENOUS; SUBCUTANEOUS at 13:54

## 2019-02-27 RX ADMIN — CEFAZOLIN SODIUM 2 G: 2 INJECTION, SOLUTION INTRAVENOUS at 00:54

## 2019-02-27 RX ADMIN — ACETAMINOPHEN 1000 MG: 500 TABLET, FILM COATED ORAL at 13:44

## 2019-02-27 RX ADMIN — Medication 0.5 MG: at 09:29

## 2019-02-27 RX ADMIN — SODIUM CHLORIDE: 9 INJECTION, SOLUTION INTRAVENOUS at 02:37

## 2019-02-27 RX ADMIN — HYDROMORPHONE HYDROCHLORIDE 1 MG: 1 INJECTION, SOLUTION INTRAMUSCULAR; INTRAVENOUS; SUBCUTANEOUS at 20:38

## 2019-02-27 RX ADMIN — HYDROMORPHONE HYDROCHLORIDE 1 MG: 1 INJECTION, SOLUTION INTRAMUSCULAR; INTRAVENOUS; SUBCUTANEOUS at 18:39

## 2019-02-27 RX ADMIN — HUMAN ALBUMIN MICROSPHERES AND PERFLUTREN 9 ML: 10; .22 INJECTION, SOLUTION INTRAVENOUS at 13:40

## 2019-02-27 RX ADMIN — SODIUM CHLORIDE: 9 INJECTION, SOLUTION INTRAVENOUS at 13:07

## 2019-02-27 RX ADMIN — Medication 0.5 MG: at 05:04

## 2019-02-27 RX ADMIN — METOPROLOL SUCCINATE 25 MG: 25 TABLET, EXTENDED RELEASE ORAL at 12:53

## 2019-02-27 RX ADMIN — LOSARTAN POTASSIUM AND HYDROCHLOROTHIAZIDE 1 TABLET: 100; 25 TABLET, FILM COATED ORAL at 12:55

## 2019-02-27 RX ADMIN — HYDROMORPHONE HYDROCHLORIDE 1 MG: 1 INJECTION, SOLUTION INTRAMUSCULAR; INTRAVENOUS; SUBCUTANEOUS at 16:11

## 2019-02-27 RX ADMIN — Medication 0.5 MG: at 07:13

## 2019-02-27 RX ADMIN — KETOROLAC TROMETHAMINE 30 MG: 15 INJECTION, SOLUTION INTRAMUSCULAR; INTRAVENOUS at 12:59

## 2019-02-27 RX ADMIN — Medication 0.5 MG: at 11:39

## 2019-02-27 RX ADMIN — LIDOCAINE HYDROCHLORIDE 1.5 ML: 40 SOLUTION TOPICAL at 08:13

## 2019-02-27 RX ADMIN — CLOPIDOGREL BISULFATE 75 MG: 75 TABLET, FILM COATED ORAL at 12:56

## 2019-02-27 RX ADMIN — MAGNESIUM SULFATE HEPTAHYDRATE 2 G: 40 INJECTION, SOLUTION INTRAVENOUS at 00:54

## 2019-02-27 RX ADMIN — MIDAZOLAM HYDROCHLORIDE 7 MG: 1 INJECTION, SOLUTION INTRAMUSCULAR; INTRAVENOUS at 10:09

## 2019-02-27 RX ADMIN — ASPIRIN 81 MG: 81 TABLET, COATED ORAL at 12:55

## 2019-02-27 RX ADMIN — FENTANYL CITRATE 50 MCG: 50 INJECTION, SOLUTION INTRAMUSCULAR; INTRAVENOUS at 09:41

## 2019-02-27 ASSESSMENT — MIFFLIN-ST. JEOR: SCORE: 1566.63

## 2019-02-27 NOTE — PROGRESS NOTES
Orthopedic Surgery  Ross Smith  2019  Admit Date:  2019  POD 1 Day Post-Op  S/P Left CASSANDRA - TRIA    Patient in bed, restless, attempted HALIMA failed due to patient intolerance  Pain seems poorly controlled.  NPO for HALIMA    Oriented to person - recently had sedation for procedure  Vital Sign Ranges  Temperature Temp  Av.9  F (36.6  C)  Min: 97.2  F (36.2  C)  Max: 98.6  F (37  C)   Blood pressure Systolic (24hrs), Av , Min:95 , Max:164        Diastolic (24hrs), Av, Min:48, Max:127      Pulse Pulse  Av.8  Min: 73  Max: 106   Respirations Resp  Av.9  Min: 6  Max: 26   Pulse oximetry SpO2  Av.1 %  Min: 87 %  Max: 100 %       Dressing is clean, dry, and intact.   Small hematoma palpable - non-tender at distal incision  Leg lengths equal  Able to log roll left leg without issue  Bilateral calves are soft, non-tender.  Bilateral lower extremity is NVI.  Sensation intact bilateral lower extremities  Active dorsi and plantar flexion bilaterally  +Dp pulse    Labs:  Recent Labs   Lab Test 19  0533 19  1302   POTASSIUM 3.9 4.3     Recent Labs   Lab Test 19  0533 19  2330 19  1436   HGB 9.4* 9.8* 13.0*     Recent Labs   Lab Test 19  1302   INR 1.01     Recent Labs   Lab Test 19  0533 19  1436 19  1302    271 267       A/P  1. Plan   Continue Plavix and ASA 81 mg qd for DVT prophylaxis.     Mobilize with PT/OT    WBAT.   Leave dressing intact     Continue current pain regiment. I have added on standard post-op hip medications - hydroxyzine, oxycodone, toradol and robaxin to help with pain    2. Disposition   Anticipate discharge pending medical clearance    Flor Estrella PA-C

## 2019-02-27 NOTE — PROGRESS NOTES
[facility] DME Intake Maggie Hall ([phone]). Spoke to RT.    Addend 9:30 a.m.:  Call back from Maggie. Maggie confirmed [facility] is able to provide for this order; Per Maggie, nebulizer will likely be able to be delivered same day. Information added to AVS.    CHRISTINE Pedro, CAPSW  [phone]; [phone]         MD Notification    Notified Person: On-call Hospitalist  Notified Person Name: MD Hayden  Notification Date/Time: 2-26-19 2253  Notification Interaction: Page  Purpose of Notification: Awaiting call-back on-call ortho, L hip surgery drainage, hematoma, intermitt. tingling foot, uncontrolled pain. Good pulses, color, motion.    Orders Received:  Comments:

## 2019-02-27 NOTE — CONSULTS
[facility]    Cardiology Consultation     Date of Admission:  2/26/2019    Assessment & Plan     1.  Inferior STEMI during Left CASSANDRA  2.  S/P POBA with instillation of Adenosine in distal vascular bed  3.  HTN  4.  ETOH abuse    Recommendations    1.  Unclear if etiology is embolic disease or related to hypotension and coronary obstruction due to stagnation of flow.  Details and hemodynamics of his orthopedic procedure are not available at time of my dictation.    2.  Patient has responded well to Plain old balloon angioplasty, with no residual stenosis noted at the target vessel lesion and the remainder of his coronaries.  3.  We will continue with dual antiplatelet therapy and heparin, consideration for transesophageal echocardiogram and telemetry monitoring should be made as an inpatient and possibly as an outpatient to exclude paroxysmal atrial fibrillation.  4.  Bedrest 2 hours post Angio-Seal, echocardiogram has been ordered and troponins will be trended.  5.  We will get assistance from our hospitalist and orthopedic  Surgery colleagues for additional care  6.  CCU team will continue to follow him.  7.  Would recommend that he follow-up with his established [facility] team.      Greater than 30 minutes critical care time beyond the case, coordinating his care with inpatient CCU team, hospitalist team, and discussing with the ER and family.      Nu Henry MD      History of present illness    Patient is a 48-year-old gentleman with no prior cardiac history who is brought emergently to cardiac Cath Lab.  He was in the process of undergoing left hip arthroplasty and was noted to be hypotensive and had EKG changes.  Left hip procedure was completed at [facility].  Post procedure EKG demonstrated inferior STEMI pattern and patient complained of chest discomfort.  He was transported emergently via EMS to [facility] as this was the closest medical facility.   Coronary angiography was emergently performed via right femoral access and occlusive disease was noted in a very distal obtuse marginal branch with a cut off sign however no evidence of plaque rupture was noted.  Balloon angioplasty was performed sequentially with a 2.25 x 12 emerge balloon, several therapies were applied.  There appeared to be propagation of the thrombus noted and installation of adenosine in the distal micro vascular bed was then performed.  With this restoration of WANDA-3 flow was noted.  There appeared to be no plaque rupture MI or any other additional coronary artery disease noted.  He was given dual antiplatelet therapy with a bolus of Plavix, aspirin had already been given in the emergency room.  Due to the appearance of the lesion there is a concern for embolic disease via low flow situation from hypotension and obstructive coronary disease versus proximal atrial fibrillation and/or left atrial appendage thrombus.  EKG has improved and patient is chest pain-free and is taken to CCU for additional care.      Nu Henry MD    Primary Care Physician   Taqueria Sherman    Patient Active Problem List   Diagnosis     STEMI (ST elevation myocardial infarction) (H)       Past Medical History   I have reviewed this patient's medical history and updated it with pertinent information if needed.   Past Medical History:   Diagnosis Date     Arthritis      ETOH abuse      Hypertension        Past Surgical History   I have reviewed this patient's surgical history and updated it with pertinent information if needed.  History reviewed. No pertinent surgical history.    Prior to Admission Medications   Prior to Admission Medications   Prescriptions Last Dose Informant Patient Reported? Taking?   amLODIPine (NORVASC) 10 MG tablet 2/26/2019 at Unknown time  Yes Yes   Sig: Take 10 mg by mouth daily   losartan-hydrochlorothiazide (HYZAAR) 100-25 MG tablet 2/26/2019 at Unknown time  Yes Yes   Sig:  Take 1 tablet by mouth daily   meloxicam (MOBIC) 15 MG tablet 2/26/2019 at Unknown time  Yes Yes   Sig: Take 15 mg by mouth daily   metoprolol succinate ER (TOPROL-XL) 25 MG 24 hr tablet 2/26/2019 at Unknown time  Yes Yes   Sig: Take 25 mg by mouth daily      Facility-Administered Medications: None     Current Facility-Administered Medications   Medication Dose Route Frequency     [START ON 2/27/2019] amLODIPine  10 mg Oral Daily     [START ON 2/27/2019] aspirin  81 mg Oral Daily     atorvastatin  40 mg Oral QPM     ceFAZolin  2 g Intravenous Q8H     [START ON 2/27/2019] clopidogrel  75 mg Oral Daily     lidocaine         [START ON 2/27/2019] losartan-hydrochlorothiazide  1 tablet Oral Daily     [START ON 2/27/2019] metoprolol succinate ER  25 mg Oral Daily     sodium chloride (PF)  3 mL Intracatheter Q8H     Current Facility-Administered Medications   Medication Last Rate     Continuing ACE inhibitor/ARB/ARNI from home medication list OR ACE inhibitor/ARB order already placed during this visit       - MEDICATION INSTRUCTIONS -       HEParin 850 Units/hr (02/26/19 1554)     nitroGLYcerin Stopped (02/26/19 1400)     - MEDICATION INSTRUCTIONS -       Percutaneous Coronary Intervention orders placed (this is information for BPA alerting)       sodium chloride 100 mL/hr at 02/26/19 1542     Allergies   No Known Allergies    Social History    reports that he drinks alcohol.    Family History   History reviewed. No pertinent family history.    Review of Systems   The comprehensive 10 point Review of Systems is negative other than noted in the HPI or here.     Physical Exam   Vital Signs with Ranges  Temp:  [97.2  F (36.2  C)-97.9  F (36.6  C)] 97.9  F (36.6  C)  Pulse:  [] 105  Heart Rate:  [] 92  Resp:  [6-26] 22  BP: (107-164)/() 140/90  SpO2:  [87 %-100 %] 96 %  Wt Readings from Last 4 Encounters:   02/26/19 72.8 kg (160 lb 7.9 oz)     I/O last 3 completed shifts:  In: -   Out: 455  "[Urine:455]      Vitals: /90   Pulse 105   Temp 97.9  F (36.6  C) (Oral)   Resp 22   Ht 1.702 m (5' 7\")   Wt 72.8 kg (160 lb 7.9 oz)   SpO2 96%   BMI 25.14 kg/m      Head: atraumatic normocephalic, sclera noninjected and anicterric, oral mucosa moist without lesions or exudates.  Neck: supple without spinal abnormality  Chest: clear to auscultation bilaterally, without wheezes, rhonchi, or rales.  Cardiovascular: regular rate and rhythm, no murmurs, gallops, or rubs, no edema  Abdomen: bowel sounds normal, nontender and nondistended, no hepatosplenomegaly or masses.  Musculoskeletal: normal muscle mass and tone  Skin: no rashes  Lymph: no lymphadenopathy.  Neuro: cranial nerves II-XII intact, strength in all four extremities normal, reflexes normal, coordination normal        Recent Labs   Lab 02/26/19  1302   TROPI 0.032       Recent Labs   Lab 02/26/19  1436 02/26/19  1302   WBC 18.6* 17.4*   HGB 13.0* 13.2*   MCV 73* 74*    267   INR  --  1.01   NA  --  137   POTASSIUM  --  4.3   CHLORIDE  --  103   CO2  --  26   BUN  --  10   CR  --  1.01   GFRESTIMATED  --  87   GFRESTBLACK  --  >90   ANIONGAP  --  8   ROBINSON  --  9.2   GLC  --  118*   TROPI  --  0.032     Recent Labs   Lab Test 02/26/19  1436   CHOL 178   HDL 58   *   TRIG 52     Recent Labs   Lab 02/26/19  1436 02/26/19  1302   WBC 18.6* 17.4*   HGB 13.0* 13.2*   HCT 39.7* 40.7   MCV 73* 74*    267     No results for input(s): PH, PHV, PO2, PO2V, SAT, PCO2, PCO2V, HCO3, HCO3V in the last 168 hours.  No results for input(s): NTBNPI, NTBNP in the last 168 hours.  No results for input(s): DD in the last 168 hours.  No results for input(s): SED, CRP in the last 168 hours.  Recent Labs   Lab 02/26/19  1436 02/26/19  1302    267     No results for input(s): TSH in the last 168 hours.  No results for input(s): COLOR, APPEARANCE, URINEGLC, URINEBILI, URINEKETONE, SG, UBLD, URINEPH, PROTEIN, UROBILINOGEN, NITRITE, LEUKEST, RBCU, " WBCU in the last 168 hours.    Imaging:  Recent Results (from the past 48 hour(s))   XR Chest Port 1 View    Narrative    XR CHEST PORT 1 VW 2/26/2019 1:05 PM    COMPARISON: None.    HISTORY: ST elevation myocardial infarction.      Impression    IMPRESSION: Platelike atelectasis laterally in the RIGHT midlung and  RIGHT base. Lungs are otherwise clear. No pleural effusion or  pneumothorax seen on either side. RIGHT axilla within normal limits.    SANDI SIMON MD   XR Pelvis w Hip Port Bilateral    Narrative    PELVIS WITH UNILATERAL HIP ONE VIEW LEFT  2/26/2019 6:18 PM     HISTORY: Hip arthroplasty    COMPARISON: None.     FINDINGS:    There is a hip arthroplasty in anatomic alignment with  well-seated components.      Impression    IMPRESSION:   Hip arthroplasty in anatomic alignment.    BEATRICE DA SILVA MD       Echo:  No results found for this or any previous visit (from the past 4320 hour(s)).

## 2019-02-27 NOTE — PROGRESS NOTES
X-cover:  Ongoing post op hip pain, not controlled by norco or morphine.  Stop morphine and start dilaudid prn and increased frequency.    Beckie Teague MD

## 2019-02-27 NOTE — PLAN OF CARE
Post cath lab, pt denies CP, SOB.  Sats 87% upon arrival and more lethargic/sedated.  Pt sedation wearing off, A/O, awake, weaned to RA.  LS clear.  C/o increasing pain, receiving IV Morphine, pt now tolerating diet, pain meds switched to PO [place], pt using ice to L hip with good relief.  Aquacel AG drsg changed x1.  Small hematoma forming lower half of incision, silver dollar sized.  Remains on heparin gtt.  Cards ordered HALIMA for 2/27.  VS stable.  Voiding well.  R groin angioseal WNL.

## 2019-02-27 NOTE — PROGRESS NOTES
HALIMA procedure aborted after many attempts to advance prob and/or keep probe in place when advanced. Pt very restless with each attempt, pulling at probe and rising into sitting position, difficulty keeping bite block in place with all attempts.  VSS, maintaining adequate oxygen sats on 2L/NC.  Versed 7 mg Fentanyl 50 mcg given for procedure.  Report to Sanaz SHAHID Rn  Wife in room, call light in reach.

## 2019-02-27 NOTE — PLAN OF CARE
Full code. SR 80-90s. A&O x4. Hep gtt fir r/o clots. Lactic 5.4, Trop peak 119.975, MD notified. L hip hematoma, drainage, L foot tingling, with uncontrolled pain; on-call ortho notified and pressure dressing applied with consultation from ST . Stat Hgb recorded, MD aware. Uncontrolled pain 10/10 despite narcotics, NP Henry aware and 15 IV ketamine, tylenol, and 2g Mg, with 0.5 dilaudid Q2. Decreasing pain, 4/10 at time of entry. Denies CP, SOB. 2L NC. Good pulses, color, motion. Pt and family educated on post-surgical hip precautions. Ancef given 0000.    Plan HALIMA 2/27. NPO since midnight. Continue heparin gtt. Continue management of pain relief.

## 2019-02-27 NOTE — PROGRESS NOTES
[facility]    Sepsis Evaluation Progress Note    Date of Service: 02/26/2019    I was called to see Ross Smith due to abnormal vital signs triggering the Sepsis SIRS screening alert. He is not known to have an infection.     Physical Exam    Vital Signs:  Temp: 97.9  F (36.6  C) Temp src: Oral BP: 113/83 Pulse: 90 Heart Rate: 86 Resp: 14 SpO2: 99 % O2 Device: None (Room air) Oxygen Delivery: 2 LPM    Lab:  Lactate for Sepsis Protocol   Date Value Ref Range Status   02/26/2019 5.4 (HH) 0.7 - 2.0 mmol/L Final     Comment:     Critical Value called to and read back by  BRIDGETTE RIVERA IN CCU 2202 CK         The patient is at baseline mental status.    The rest of their physical exam is significant for transient tachycardia because of uncontrolled pain.    Assessment and Plan    The SIRS and exam findings are likely due to recent surgery, uncontrolled pain, there is no sign of sepsis at this time.    Disposition: The patient will remain on the current unit. We will continue to monitor this patient closely; repeat LA in 2 hours..    Socorro Hayden MD

## 2019-02-27 NOTE — CONSULTS
[facility]    Orthopedics Consultation    Date of Admission:  2/26/2019    Assessment & Plan   Ross Smith is a 48 year old male who was admitted on 2/26/2019. I was asked to see the patient for postop treatment recommendations for total hip arthroplasty. Per patient and family report the were advised the hip replacement went well without complications. Review of x-rays reveals CASSANDRA in good position with no apparent complications. Primary concern will be postop hematoma, as patient is being anticoagulated and there is no drain In place. No apparent complications from CASSANDRA at this point.     I contacted his Orthopedic Surgeon, Antonio Wing, at [facility] to obtain details about above. He advised that closure was standard and that patient is clear to proceed with WBAT with no posterior hip precautions. PT may work with patient beginning 2/27/2019. Aquacel Ag dressing is to remain in place until follow up. He would like to see Mr. Smith in the office in 10-14 days for routine follow-up.    Please call with questions.    Raghavendra Thibodeaux MD  Orthopedic Trauma and Arthroplasty  [facility]  [phone]     Active Problems:    STEMI (ST elevation myocardial infarction) (H)    Raghavendra Thibodeaux MD     Code Status    Full Code    Reason for Consult   Reason for consult: I was asked by Cardiology to evaluate this patient for postop evaluation and treatment recommendations status post left total hip arthroplasty.      Primary Care Physician   Taqueria Sherman    Chief Complaint   Status Post Left Total Hip Arthroplasty     History is obtained from the patient, his family and the electronic medical record.    History of Present Illness   Ross Smith is a 48 year old male who presents status post left CASSANDRA at [facility] secondary to cardiac event. This morning, patient was undergoing a left hip arthroplasty at [facility] orthopedics when he became hypotensive and tachycardic.  The  patient's anesthesia was lightened chest pressure.  ECG demonstrated ST elevation MI.  The patient was transported emergently to [facility] emergency department.  The patient was evaluated by cardiology and emergently taken to the Cath Lab.  Coronary angiography demonstrated single-vessel disease involving the second obtuse marginal.  There was no evidence for ruptured plaque and lesion in the appearance of an embolic phenomenon.  Balloon angioplasty followed by aspiration thrombectomy was performed with return of flow.  There was distal embolization to the inferior pole of OM2.  Post procedure, patient was brought to the CCU.  He currently is resting in bed.  He denies chest pain or shortness of breath.  He complains of left hip pain and inability urinate. numbness, tingling and weakness    Past Medical History   I have reviewed this patient's medical history and updated it with pertinent information if needed.   Past Medical History:   Diagnosis Date     Arthritis      ETOH abuse      Hypertension        Past Surgical History   I have reviewed this patient's surgical history and updated it with pertinent information if needed.  History reviewed. No pertinent surgical history.    Prior to Admission Medications   Prior to Admission Medications   Prescriptions Last Dose Informant Patient Reported? Taking?   amLODIPine (NORVASC) 10 MG tablet 2/26/2019 at Unknown time  Yes Yes   Sig: Take 10 mg by mouth daily   losartan-hydrochlorothiazide (HYZAAR) 100-25 MG tablet 2/26/2019 at Unknown time  Yes Yes   Sig: Take 1 tablet by mouth daily   meloxicam (MOBIC) 15 MG tablet 2/26/2019 at Unknown time  Yes Yes   Sig: Take 15 mg by mouth daily   metoprolol succinate ER (TOPROL-XL) 25 MG 24 hr tablet 2/26/2019 at Unknown time  Yes Yes   Sig: Take 25 mg by mouth daily      Facility-Administered Medications: None     Allergies   No Known Allergies    Social History   I have reviewed this patient's social history and updated  it with pertinent information if needed. Ross Smith  reports that he drinks alcohol.    Family History   I have reviewed this patient's family history and updated it with pertinent information if needed.   History reviewed. No pertinent family history.    Review of Systems   The 10 point Review of Systems is negative other than noted in the HPI or here.     Physical Exam   Temp: 97.2  F (36.2  C) Temp src: Oral BP: (!) 137/99 Pulse: 95 Heart Rate: 102 Resp: 17 SpO2: 99 % O2 Device: Nasal cannula Oxygen Delivery: 3 LPM  Vital Signs with Ranges  Temp:  [97.2  F (36.2  C)-97.3  F (36.3  C)] 97.2  F (36.2  C)  Pulse:  [] 95  Heart Rate:  [] 102  Resp:  [6-26] 17  BP: (130-164)/() 137/99  SpO2:  [87 %-100 %] 99 %  160 lbs 7.92 oz    Constitutional: awake, fatigued, alert, cooperative, no apparent distress, appears stated age and normal weight  Eyes: extra-ocular muscles intact  Hematologic / Lymphatic: No lymphedema  Respiratory: no increased work of breathing  Skin: Aquacel Ag Drtessing in place posterolateral incision with 2 cm X 2cm strikethrough that appears stable based on lesley made by nursing.  Musculoskeletal: no lower extremity pitting edema present  Neurologic: Mental Status Exam:  Level of Alertness:   awake  Orientation:   person, place, time  Motor Exam:  moves all extremities well and symmetrically  Sensory:  Sensory intact    Data   Results for orders placed or performed during the hospital encounter of 02/26/19 (from the past 24 hour(s))   EKG 12 lead   Result Value Ref Range    Interpretation ECG Click View Image link to view waveform and result    CBC with platelets differential   Result Value Ref Range    WBC 17.4 (H) 4.0 - 11.0 10e9/L    RBC Count 5.52 4.4 - 5.9 10e12/L    Hemoglobin 13.2 (L) 13.3 - 17.7 g/dL    Hematocrit 40.7 40.0 - 53.0 %    MCV 74 (L) 78 - 100 fl    MCH 23.9 (L) 26.5 - 33.0 pg    MCHC 32.4 31.5 - 36.5 g/dL    RDW 15.4 (H) 10.0 - 15.0 %    Platelet Count 267 996  - 450 10e9/L    Diff Method Automated Method     % Neutrophils 78.9 %    % Lymphocytes 18.7 %    % Monocytes 1.6 %    % Eosinophils 0.4 %    % Basophils 0.1 %    % Immature Granulocytes 0.3 %    Nucleated RBCs 0 0 /100    Absolute Neutrophil 13.7 (H) 1.6 - 8.3 10e9/L    Absolute Lymphocytes 3.3 0.8 - 5.3 10e9/L    Absolute Monocytes 0.3 0.0 - 1.3 10e9/L    Absolute Eosinophils 0.1 0.0 - 0.7 10e9/L    Absolute Basophils 0.0 0.0 - 0.2 10e9/L    Abs Immature Granulocytes 0.1 0 - 0.4 10e9/L    Absolute Nucleated RBC 0.0    INR   Result Value Ref Range    INR 1.01 0.86 - 1.14   Partial thromboplastin time   Result Value Ref Range    PTT 26 22 - 37 sec   Basic metabolic panel   Result Value Ref Range    Sodium 137 133 - 144 mmol/L    Potassium 4.3 3.4 - 5.3 mmol/L    Chloride 103 94 - 109 mmol/L    Carbon Dioxide 26 20 - 32 mmol/L    Anion Gap 8 3 - 14 mmol/L    Glucose 118 (H) 70 - 99 mg/dL    Urea Nitrogen 10 7 - 30 mg/dL    Creatinine 1.01 0.66 - 1.25 mg/dL    GFR Estimate 87 >60 mL/min/[1.73_m2]    GFR Estimate If Black >90 >60 mL/min/[1.73_m2]    Calcium 9.2 8.5 - 10.1 mg/dL   Troponin I   Result Value Ref Range    Troponin I ES 0.032 0.000 - 0.045 ug/L   XR Chest Port 1 View    Narrative    XR CHEST PORT 1 VW 2/26/2019 1:05 PM    COMPARISON: None.    HISTORY: ST elevation myocardial infarction.      Impression    IMPRESSION: Platelike atelectasis laterally in the RIGHT midlung and  RIGHT base. Lungs are otherwise clear. No pleural effusion or  pneumothorax seen on either side. RIGHT axilla within normal limits.    SANDI SIMON MD   Cardiac Catheterization    Narrative    Procedures:  Coronary Angiography  Percutaneous Coronary Intervention with POBA of the OM2  Aspiration Thrombectomy    PHYSICIANS:  Attending Physician: Dr Henry  Interventional Cardiology Fellow: Koko Ibrahim    HPI/INDICATION:  48 year old gentleman with a history of hypertension who was  undergoing a left hip arthroplasty and was found to  have inferior ST  elevations    DESCRIPTION:  1. Consent obtained with discussion of risks. All questions were  answered.  2. Sterile prep and procedure.  3. Location with Sheaths:   6 Kosovan right common femoral artery  4. Access: Local anesthetic with lidocaine. A micropuncture 21 gauge  needle  was used to establish vascular access using a modified  Seldinger technique.  5. Catheters:  3DRC and JL4, 6 Kosovan CLS 3.5  6. Estimated blood loss: < 25 ml    MEDICATIONS:  The procedure was performed under conscious sedation for 30?minutes  from 1325 to 1355.  The patient was assessed immediately before the first sedation  medication was administered. Midazolam 2mg and Fentanyl 100mcg?were  administered.  Heart rate, BP, respiration, oxygen saturation and patient responses  were monitored throughout the procedure with the assistance of the RN  under my supervision.  IV Heparin was administered to achieve anticoagulation.  Antiplatelet Therapy: ASA and Plavix    Procedures:  Coronary Angiogram:   -Both coronary arteries arise from their respective cusps.  -Dominance: Right  -LM is without angiographic evidence of disease.   -LAD: Type 3 [LAD supplies the entire apex]. The LAD gives rise to  septal perforators, only trivial diagonals. There are only minimal  luminal irregularities in the LAD. The vessel is tortuous  .  -LCX gives rise to OM1 and OM2. Large caliber vessels, tortuous. There  is a thrombotic occlusion of the OM2. Otherwise there are only minimal  luminal irregularities..   -RCA gives rise to PL branches and supplies PDA. No angiographic  evidence of disease.    PCI: OM2    A 6 Kosovan CLS 3.5 was positioned in the left main. A runthrough wire  was advanced into the OM 2 superior pole branch. A 2.5x12mm Emerge was  advanced into the OM2 and the lesion was pre dilated. There was no  evidence of a ruptured plaque with thrombotic occlusion and migration  of thrombus distally. There was improvement in flow with  balloon pre  dilation. The balloon was then exchanged for a Pronto LP aspiration  thrombectomy catheter and the Runthrough wire was repositioned into  the inferior pole of the OM2 branch. The Pronto LP was advanced into  the OM2 and IC adenosine was given. Aspiration thrombectomy was then  performed. On final angiography there was no evidence of perforation  or dissection with WANAD III flow in all vessels. There was distal  embolization of the inferior pole of the OM2 branch at its distal  point. No complications.    Sheath Removal:  Femoral sheath removed, angioseal in place    Contrast: Isovue, 200ml     Fluoroscopy Time: 8.5min    COMPLICATIONS:  None    SUMMARY:   Single vessel disease involving OM2. This has the appearance of an  embolic phenomenon as opposed to a ruptured plaque.    PLAN:   ASA 81 mg qd and Plavix 75 mg daily  Continue BB, ACE and statin per primary team  Continue heparin drip until SHERRELL thrombus can be ruled out. Recommend  HALIMA and cardiac event monitor to screen for atrial fibrillation.  Continued medical management and lifestyle modification for  cardiovascular risk factor optimization.  Admit to inpatient    The attending interventional cardiologist, Dr Henry, was present and  supervised all critical aspects the procedure.    Koko Ibrahim  Interventional Cardiology Fellow    I have personally reviewed the examination and initial interpretation  and I agree with the findings.    CHRISTELLE HENRY MD   CBC with platelets   Result Value Ref Range    WBC 18.6 (H) 4.0 - 11.0 10e9/L    RBC Count 5.41 4.4 - 5.9 10e12/L    Hemoglobin 13.0 (L) 13.3 - 17.7 g/dL    Hematocrit 39.7 (L) 40.0 - 53.0 %    MCV 73 (L) 78 - 100 fl    MCH 24.0 (L) 26.5 - 33.0 pg    MCHC 32.7 31.5 - 36.5 g/dL    RDW 15.4 (H) 10.0 - 15.0 %    Platelet Count 271 150 - 450 10e9/L   Lipid Profile   Result Value Ref Range    Cholesterol 178 <200 mg/dL    Triglycerides 52 <150 mg/dL    HDL Cholesterol 58 >39 mg/dL    LDL  Cholesterol Calculated 110 (H) <100 mg/dL    Non HDL Cholesterol 120 <130 mg/dL   EKG 12-lead, tracing only    Result Value Ref Range    Interpretation ECG Click View Image link to view waveform and result    Orthopedic Surgery IP Consult: Patient to be seen: ASAP - within 4 hours; Call back #: [id number]; new L cassandra; Consultant may enter orders: Yes; Requesting provider? Hospitalist (if different from attending physician)    Flor Lynn PA-C     2/26/2019  5:00 PM  Consult received    Patient transferred immediately post-op from primary CASSANDRA at [facility]   (unknown approach or surgeon at this time)    Will order post-operative x-rays   Orders placed for WBAT with walker once xray returned  Hip precautions  Compression stockings    Flor Estrella PAC     Imaging:  Pelvis AP and AP/Lateral Left Hip:  Total hip arthroplasty with components in good position. No freactures. No apparent complications. Staples in place.

## 2019-02-27 NOTE — PROGRESS NOTES
Assessed for HALIMA.  VSS, pain under control after dilaudid given earlier this am.  Denies any false or removable teeth, denies any diff swallowing or choking on food.  Denies sleep apnea.  Lungs clear per primary RN assessment at this time.  IV patent.  MD paged, will start HALIMA at appox 0915, MD aware pt will not be able to tolerated left sided position d/t recent surgery.

## 2019-02-27 NOTE — PROGRESS NOTES
MD Notification    Notified Person: On-call Hospitalist  Notified Person Name: MD Peralta  Notification Date/Time: 2-26-19 2200  Notification Interaction: Page  Purpose of Notification: Lactic 5.4    Orders Received:  Comments:

## 2019-02-27 NOTE — PROGRESS NOTES
"Cardiology Progress Note  Curt Yoder         Assessment and Plan:      Inferior ST elevation myocardial infarction yesterday  Peak troponin 119, decreased to 90.  No chest pain  On aspirin and Plavix  Was on heparin but because of drop in hemoglobin by 4g, we had to discontinue it.  Dr. Henry and I discussed that.he also recommended heparin should be discontinued.  On beta-blockers.    Etiologies possibly embolic after the hip procedure or related to hypertension and coronary artery obstruction.  He had plain angioplasty and aspiration thrombectomy.  We attempted a transesophageal echocardiogram today but he could not tolerated and images could not be obtained.  I will obtain a surface transthoracic echocardiogram to assess wall motion and ejection fraction.  If required, we might do a cardiac MRI to see if he can assess for any cardiac thrombus.  I discussed with orthopedic team PA and she said it was okay to proceed with cardiac MRI today or tomorrow.    2.  Status post left hip arthroplasty, in significant discomfort  This is his main issue.  He is getting several pain medications and injectables.  Management per primary team and orthopedics.    I discussed the present state as well as the plan with the patient and his daughter in detail.  I answered all their questions to their satisfaction.  I have asked him to call me if there is any questions or concerns.                     Interval History:   denies chest pain          Review of Systems:   The 5 point Review of Systems is negative other than noted in the HPI          Physical Exam:        Blood pressure 124/82, pulse 101, temperature 99.2  F (37.3  C), temperature source Oral, resp. rate 17, height 1.702 m (5' 7\"), weight 73.8 kg (162 lb 11.2 oz), SpO2 100 %.  Vitals:    02/26/19 1301 02/27/19 0250   Weight: 72.8 kg (160 lb 7.9 oz) 73.8 kg (162 lb 11.2 oz)     Weights since admission  Vitals:    02/26/19 1301 02/27/19 0250   Weight: 72.8 kg (160 " lb 7.9 oz) 73.8 kg (162 lb 11.2 oz)     Vital Signs with Ranges  Temp:  [97.2  F (36.2  C)-99.2  F (37.3  C)] 99.2  F (37.3  C)  Pulse:  [] 101  Heart Rate:  [] 116  Resp:  [6-26] 17  BP: ()/() 124/82  SpO2:  [87 %-100 %] 100 %  I/O's Last 24 hours  I/O last 3 completed shifts:  In: 755 [P.O.:355; I.V.:400]  Out: 1905 [Urine:1905]  Net I/O since admission  02/22 0700 - 02/27 0659  In: 755 [P.O.:355; I.V.:400]  Out: 1905 [Urine:1905]  Net: -1150    EXAM:    Constitutional:   in no apparent distress     Head:   Normocephalic, atramatic     Lungs:   normal     Cardiovascular:   normal S1 and S2 and no murmur noted     Neurological:   No gross or focal neurologic abnormalities, left hip range motion decreased            Medications:          amLODIPine  10 mg Oral Daily     aspirin  81 mg Oral Daily     atorvastatin  40 mg Oral QPM     clopidogrel  75 mg Oral Daily     glycopyrrolate  0.1 mg Intravenous Once     losartan-hydrochlorothiazide  1 tablet Oral Daily     metoprolol succinate ER  25 mg Oral Daily     sodium chloride (PF)  3 mL Intracatheter Q8H     sodium chloride (PF)  3 mL Intracatheter Q8H            Data:      All new lab and imaging data was reviewed.   Recent Labs   Lab Test 02/27/19  0533 02/26/19  2330 02/26/19  1436 02/26/19  1302   WBC 11.1*  --  18.6* 17.4*   HGB 9.4* 9.8* 13.0* 13.2*   MCV 72*  --  73* 74*     --  271 267   INR  --   --   --  1.01      Recent Labs   Lab Test 02/27/19  0533 02/26/19  1302    137   POTASSIUM 3.9 4.3   CHLORIDE 107 103   CO2 24 26   BUN 12 10   CR 0.86 1.01   ANIONGAP 6 8   ROBINSON 8.3* 9.2   * 118*     Recent Labs   Lab Test 02/27/19  0533 02/27/19  0215 02/26/19  2140   TROPI 90.909* 112.935* 119.975*         EKG results:  Pending from today     Imaging:   Recent Results (from the past 24 hour(s))   XR Chest Port 1 View    Narrative    XR CHEST PORT 1 VW 2/26/2019 1:05 PM    COMPARISON: None.    HISTORY: ST elevation  myocardial infarction.      Impression    IMPRESSION: Platelike atelectasis laterally in the RIGHT midlung and  RIGHT base. Lungs are otherwise clear. No pleural effusion or  pneumothorax seen on either side. RIGHT axilla within normal limits.    SANDI SIMON MD   XR Pelvis w Hip Port Bilateral    Narrative    PELVIS WITH UNILATERAL HIP ONE VIEW LEFT  2/26/2019 6:18 PM     HISTORY: Hip arthroplasty    COMPARISON: None.     FINDINGS:    There is a hip arthroplasty in anatomic alignment with  well-seated components.      Impression    IMPRESSION:   Hip arthroplasty in anatomic alignment.    BEATRICE DA SILVA MD

## 2019-02-27 NOTE — PLAN OF CARE
OT/CR and PT: Patient currently having a HALIMA. Per discussion with nursing, patient not appropriate for rehab today.

## 2019-02-27 NOTE — PROGRESS NOTES
[facility] note:    Contacted by RN staff regarding uncontrolled pain while I was evaluating a separate patient in the CCU. Patient was writhing in pain 2/2 large hematoma on left hip op site. Right groin arterial access site is benign. Patient has received plenty of narcotics tonight including PO norco IV morphine and IV dilaudid. Patient would benefit from multi-modal pain relief instead of escalating opioid dosing.    Interventions:  -- give PRN tylenol 650 mg  -- give PRN dilaudid 0.5 mg  -- give 2g IV magnesium  -- give 15 mg IV ketamine  -- hold off on NSAIDs in the setting of acute bleeding    XENIA Avelar, CNP  Hospitalist - [facility]  Text Page  [phone])

## 2019-02-27 NOTE — PROGRESS NOTES
MD Notification    Notified Person: On-call Hospitalist  Notified Person Name: MD Peralta  Notification Date/Time: 2-26-19 2245  Notification Interaction: Page  Purpose of Notification: Trop 0.032 to 119.975, known STEMI with intervention.    Orders Received:  Comments:

## 2019-02-27 NOTE — PROVIDER NOTIFICATION
Xcoverage: paged by RN because elevated trop of 119.975, after cardiac cath; no chest pain; he is on he[asha drip; repeat trop q4hX2.  Also RN reported left hip surgery site with drainage, hematoma, uncontrolled pain and intermittent tingling sensation in LLE; Ortho paged- defer post op care to Ortho; Hb STAT ordered.    Addendum: 2:25am- repeat Hb down from 13-- 9.8; vitals stable; repeat Hb in am.    Diamante Hayden MD

## 2019-02-27 NOTE — PROGRESS NOTES
MD Notification  Notified Person: On-call Orthopedics  Notified Person Name: MD Zain  Notification Date/Time: 2-26-19, [facility] page est 1038  Notification Interaction: Page  Purpose of Notification: Tingling L foot, hematoma, drainage with hep gtt 850, uncontrolled pain with IV dilaudid and norco 2-tab given.    Orders Received: Place pressure dressing to L hip. Continue pain medication as ordered, 0.5 IV dilaudid. MD aware of foot tingling, hematoma, drainage, and c/o pain.    Comments: Writer consulted with JOSÉ MIGUEL regarding hip pressure dressing. Materials and education received.

## 2019-02-27 NOTE — CONSULTS
NUTRITION BRIEF NOTE    REASON FOR ASSESSMENT:  Nutrition education on American Heart Association (AHA) Heart Healthy Diet.    -Was in room with family and pt (pt on phone), family had asked me to wait.   -RN in room states pt not appropriate for education at this time    Follow Up/Monitoring:  Will follow up with pt on LOS unless otherwise re-consulted      Evelyn Ramos RD, LD

## 2019-02-27 NOTE — PROGRESS NOTES
[facility]    Hospitalist Progress Note    Date of Service (when I saw the patient): 02/27/2019    Assessment & Plan   Ross Smith is a 48 year old male who was admitted on 2/26/2019 following an an STEMI during a left total hip arthroplasty.    1.  STEMI, suspected thromboembolic.  Cardiology assistance appreciated.  Cardiology attempted a transesophageal echocardiogram the patient was unable to tolerate.  Transthoracic echocardiogram has been ordered for today to assess for possible embolic source.  Patient may ultimately require cardiac MRI to assess for thrombus.  Continue aspirin and Plavix.  Heparin has been discontinued due to bleeding.    2.  Left total hip arthroplasty.  Orthopedic surgery assistance appreciated.  X-ray reveals that arthroplasty appears well seated, the patient is okay for weightbearing as tolerated.  PT OT have been consulted.    3.  Hypertension.  Blood pressure is poorly controlled.  Increase Toprol to 25 mg twice daily and continue amlodipine and hydrochlorothiazide/losartan.    4.  Pain control.  Severe uncontrolled pain.  Will order Tylenol to be given 3 times daily.  Increase Dilaudid 2.5-1 mg every 2 hours as needed.  Will order Toradol and Flexeril as needed for pain.    Disposition: Expected discharge in 2-3 days, pending completion of cardiac evaluation.    DVT Prophylaxis: Pneumatic Compression Devices  Code Status: Full Code    Mg Holley  Text Page (7 am to 6 pm)    Interval History   The patient continues to complain of severe pain in his left hip.  He denies chest pain or shortness of breath.    -Data reviewed today: I reviewed all new labs and imaging results over the last 24 hours. I personally reviewed no images or EKG's today.    Physical Exam   Temp: 99.1  F (37.3  C) Temp src: Oral BP: 128/81 Pulse: 109 Heart Rate: 108 Resp: 22 SpO2: 100 % O2 Device: Nasal cannula Oxygen Delivery: 2 LPM  Vitals:    02/26/19 1301 02/27/19 0250   Weight: 72.8 kg  (160 lb 7.9 oz) 73.8 kg (162 lb 11.2 oz)     Vital Signs with Ranges  Temp:  [97.9  F (36.6  C)-99.2  F (37.3  C)] 99.1  F (37.3  C)  Pulse:  [] 109  Heart Rate:  [] 108  Resp:  [6-25] 22  BP: ()/() 128/81  SpO2:  [94 %-100 %] 100 %  I/O last 3 completed shifts:  In: 935 [P.O.:535; I.V.:400]  Out: 2275 [Urine:2275]    Gen: Well nourished, well developed, alert and oriented x 3, no acute distressed  HEENT: Atraumatic, normocephalic  Lungs: Clear to ausculation without wheezes, rhonchi, or rales  Heart: Regular rate and rhythm, no gallops or rubs, no murmurs  GI: Bowel sound normal, no hepatosplenomegaly or masses  Lymph: No lymphadenopathy or edema  Skin: No rashes     Medications     Continuing ACE inhibitor/ARB/ARNI from home medication list OR ACE inhibitor/ARB order already placed during this visit       - MEDICATION INSTRUCTIONS -       - MEDICATION INSTRUCTIONS -       nitroGLYcerin Stopped (02/26/19 1400)     - MEDICATION INSTRUCTIONS -       Percutaneous Coronary Intervention orders placed (this is information for BPA alerting)       sodium chloride       sodium chloride 100 mL/hr at 02/27/19 1307       acetaminophen  1,000 mg Oral Q8H     amLODIPine  10 mg Oral Daily     aspirin  81 mg Oral Daily     atorvastatin  40 mg Oral QPM     clopidogrel  75 mg Oral Daily     losartan-hydrochlorothiazide  1 tablet Oral Daily     metoprolol succinate ER  25 mg Oral Daily     sodium chloride (PF)  3 mL Intracatheter Q8H       Data   Recent Labs   Lab 02/27/19  0533 02/27/19  0215 02/26/19  2330 02/26/19  2140 02/26/19  1436 02/26/19  1302   WBC 11.1*  --   --   --  18.6* 17.4*   HGB 9.4*  --  9.8*  --  13.0* 13.2*   MCV 72*  --   --   --  73* 74*     --   --   --  271 267   INR  --   --   --   --   --  1.01     --   --   --   --  137   POTASSIUM 3.9  --   --   --   --  4.3   CHLORIDE 107  --   --   --   --  103   CO2 24  --   --   --   --  26   BUN 12  --   --   --   --  10   CR 0.86   --   --   --   --  1.01   ANIONGAP 6  --   --   --   --  8   ROBINSON 8.3*  --   --   --   --  9.2   *  --   --   --   --  118*   TROPI 90.909* 112.935*  --  119.975*  --  0.032       Recent Results (from the past 24 hour(s))   XR Pelvis w Hip Port Bilateral    Narrative    PELVIS WITH UNILATERAL HIP ONE VIEW LEFT  2/26/2019 6:18 PM     HISTORY: Hip arthroplasty    COMPARISON: None.     FINDINGS:    There is a hip arthroplasty in anatomic alignment with  well-seated components.      Impression    IMPRESSION:   Hip arthroplasty in anatomic alignment.    BEATRICE DA SILVA MD

## 2019-02-28 ENCOUNTER — APPOINTMENT (OUTPATIENT)
Dept: CARDIOLOGY | Facility: CLINIC | Age: 49
End: 2019-02-28
Attending: PHYSICIAN ASSISTANT
Payer: COMMERCIAL

## 2019-02-28 ENCOUNTER — APPOINTMENT (OUTPATIENT)
Dept: PHYSICAL THERAPY | Facility: CLINIC | Age: 49
End: 2019-02-28
Attending: INTERNAL MEDICINE
Payer: COMMERCIAL

## 2019-02-28 ENCOUNTER — APPOINTMENT (OUTPATIENT)
Dept: CARDIOLOGY | Facility: CLINIC | Age: 49
End: 2019-02-28
Attending: INTERNAL MEDICINE
Payer: COMMERCIAL

## 2019-02-28 LAB
ANION GAP SERPL CALCULATED.3IONS-SCNC: 2 MMOL/L (ref 3–14)
BUN SERPL-MCNC: 9 MG/DL (ref 7–30)
CALCIUM SERPL-MCNC: 7.9 MG/DL (ref 8.5–10.1)
CHLORIDE SERPL-SCNC: 108 MMOL/L (ref 94–109)
CO2 SERPL-SCNC: 29 MMOL/L (ref 20–32)
CREAT SERPL-MCNC: 0.83 MG/DL (ref 0.66–1.25)
ERYTHROCYTE [DISTWIDTH] IN BLOOD BY AUTOMATED COUNT: 15.6 % (ref 10–15)
GFR SERPL CREATININE-BSD FRML MDRD: >90 ML/MIN/{1.73_M2}
GLUCOSE SERPL-MCNC: 105 MG/DL (ref 70–99)
HCT VFR BLD AUTO: 25.7 % (ref 40–53)
HGB BLD-MCNC: 8.3 G/DL (ref 13.3–17.7)
INR PPP: 1.07 (ref 0.86–1.14)
INTERPRETATION ECG - MUSE: NORMAL
MCH RBC QN AUTO: 24.3 PG (ref 26.5–33)
MCHC RBC AUTO-ENTMCNC: 32.3 G/DL (ref 31.5–36.5)
MCV RBC AUTO: 75 FL (ref 78–100)
PLATELET # BLD AUTO: 178 10E9/L (ref 150–450)
POTASSIUM SERPL-SCNC: 4 MMOL/L (ref 3.4–5.3)
RBC # BLD AUTO: 3.42 10E12/L (ref 4.4–5.9)
SODIUM SERPL-SCNC: 139 MMOL/L (ref 133–144)
WBC # BLD AUTO: 8.3 10E9/L (ref 4–11)

## 2019-02-28 PROCEDURE — 85027 COMPLETE CBC AUTOMATED: CPT | Performed by: INTERNAL MEDICINE

## 2019-02-28 PROCEDURE — 81241 F5 GENE: CPT | Performed by: PHYSICIAN ASSISTANT

## 2019-02-28 PROCEDURE — 80048 BASIC METABOLIC PNL TOTAL CA: CPT | Performed by: INTERNAL MEDICINE

## 2019-02-28 PROCEDURE — 85306 CLOT INHIBIT PROT S FREE: CPT | Performed by: PHYSICIAN ASSISTANT

## 2019-02-28 PROCEDURE — 93321 DOPPLER ECHO F-UP/LMTD STD: CPT | Mod: 26 | Performed by: INTERNAL MEDICINE

## 2019-02-28 PROCEDURE — 86146 BETA-2 GLYCOPROTEIN ANTIBODY: CPT | Performed by: PHYSICIAN ASSISTANT

## 2019-02-28 PROCEDURE — 25000128 H RX IP 250 OP 636: Performed by: PHYSICIAN ASSISTANT

## 2019-02-28 PROCEDURE — 99232 SBSQ HOSP IP/OBS MODERATE 35: CPT | Mod: 25 | Performed by: INTERNAL MEDICINE

## 2019-02-28 PROCEDURE — 85613 RUSSELL VIPER VENOM DILUTED: CPT | Performed by: PHYSICIAN ASSISTANT

## 2019-02-28 PROCEDURE — 85730 THROMBOPLASTIN TIME PARTIAL: CPT | Performed by: PHYSICIAN ASSISTANT

## 2019-02-28 PROCEDURE — 97161 PT EVAL LOW COMPLEX 20 MIN: CPT | Mod: GP | Performed by: PHYSICAL THERAPIST

## 2019-02-28 PROCEDURE — 85610 PROTHROMBIN TIME: CPT | Performed by: PHYSICIAN ASSISTANT

## 2019-02-28 PROCEDURE — A9585 GADOBUTROL INJECTION: HCPCS | Performed by: INTERNAL MEDICINE

## 2019-02-28 PROCEDURE — 12000000 ZZH R&B MED SURG/OB

## 2019-02-28 PROCEDURE — 25000132 ZZH RX MED GY IP 250 OP 250 PS 637: Performed by: PHYSICIAN ASSISTANT

## 2019-02-28 PROCEDURE — 97116 GAIT TRAINING THERAPY: CPT | Mod: GP | Performed by: PHYSICAL THERAPIST

## 2019-02-28 PROCEDURE — 25000132 ZZH RX MED GY IP 250 OP 250 PS 637: Performed by: INTERNAL MEDICINE

## 2019-02-28 PROCEDURE — 93308 TTE F-UP OR LMTD: CPT | Mod: 26 | Performed by: INTERNAL MEDICINE

## 2019-02-28 PROCEDURE — 36415 COLL VENOUS BLD VENIPUNCTURE: CPT | Performed by: INTERNAL MEDICINE

## 2019-02-28 PROCEDURE — 86147 CARDIOLIPIN ANTIBODY EA IG: CPT | Performed by: PHYSICIAN ASSISTANT

## 2019-02-28 PROCEDURE — 75561 CARDIAC MRI FOR MORPH W/DYE: CPT

## 2019-02-28 PROCEDURE — 85260 CLOT FACTOR X STUART-POWER: CPT | Performed by: PHYSICIAN ASSISTANT

## 2019-02-28 PROCEDURE — 00000401 ZZHCL STATISTIC THROMBIN TIME NC: Performed by: PHYSICIAN ASSISTANT

## 2019-02-28 PROCEDURE — 93308 TTE F-UP OR LMTD: CPT

## 2019-02-28 PROCEDURE — 36415 COLL VENOUS BLD VENIPUNCTURE: CPT | Performed by: PHYSICIAN ASSISTANT

## 2019-02-28 PROCEDURE — 99223 1ST HOSP IP/OBS HIGH 75: CPT | Performed by: INTERNAL MEDICINE

## 2019-02-28 PROCEDURE — 85303 CLOT INHIBIT PROT C ACTIVITY: CPT | Performed by: PHYSICIAN ASSISTANT

## 2019-02-28 PROCEDURE — 81240 F2 GENE: CPT | Performed by: PHYSICIAN ASSISTANT

## 2019-02-28 PROCEDURE — 85300 ANTITHROMBIN III ACTIVITY: CPT | Performed by: PHYSICIAN ASSISTANT

## 2019-02-28 PROCEDURE — 99232 SBSQ HOSP IP/OBS MODERATE 35: CPT | Performed by: INTERNAL MEDICINE

## 2019-02-28 PROCEDURE — 93325 DOPPLER ECHO COLOR FLOW MAPG: CPT | Mod: 26 | Performed by: INTERNAL MEDICINE

## 2019-02-28 PROCEDURE — 25500064 ZZH RX 255 OP 636: Performed by: INTERNAL MEDICINE

## 2019-02-28 PROCEDURE — 25000128 H RX IP 250 OP 636: Performed by: INTERNAL MEDICINE

## 2019-02-28 PROCEDURE — 97110 THERAPEUTIC EXERCISES: CPT | Mod: GP | Performed by: PHYSICAL THERAPIST

## 2019-02-28 PROCEDURE — 75561 CARDIAC MRI FOR MORPH W/DYE: CPT | Mod: 27 | Performed by: INTERNAL MEDICINE

## 2019-02-28 RX ORDER — GADOBUTROL 604.72 MG/ML
5-65 INJECTION INTRAVENOUS ONCE
Status: COMPLETED | OUTPATIENT
Start: 2019-02-28 | End: 2019-02-28

## 2019-02-28 RX ADMIN — OXYCODONE HYDROCHLORIDE 10 MG: 5 TABLET ORAL at 21:50

## 2019-02-28 RX ADMIN — AMLODIPINE BESYLATE 10 MG: 10 TABLET ORAL at 08:31

## 2019-02-28 RX ADMIN — ACETAMINOPHEN 1000 MG: 500 TABLET, FILM COATED ORAL at 21:50

## 2019-02-28 RX ADMIN — ACETAMINOPHEN 1000 MG: 500 TABLET, FILM COATED ORAL at 05:24

## 2019-02-28 RX ADMIN — LOSARTAN POTASSIUM AND HYDROCHLOROTHIAZIDE 1 TABLET: 100; 25 TABLET, FILM COATED ORAL at 08:31

## 2019-02-28 RX ADMIN — OXYCODONE HYDROCHLORIDE 10 MG: 5 TABLET ORAL at 18:03

## 2019-02-28 RX ADMIN — METOPROLOL SUCCINATE 50 MG: 50 TABLET, EXTENDED RELEASE ORAL at 08:31

## 2019-02-28 RX ADMIN — ASPIRIN 81 MG: 81 TABLET, COATED ORAL at 08:31

## 2019-02-28 RX ADMIN — KETOROLAC TROMETHAMINE 30 MG: 15 INJECTION, SOLUTION INTRAMUSCULAR; INTRAVENOUS at 08:33

## 2019-02-28 RX ADMIN — OXYCODONE HYDROCHLORIDE 10 MG: 5 TABLET ORAL at 00:00

## 2019-02-28 RX ADMIN — HYDROMORPHONE HYDROCHLORIDE 1 MG: 1 INJECTION, SOLUTION INTRAMUSCULAR; INTRAVENOUS; SUBCUTANEOUS at 12:40

## 2019-02-28 RX ADMIN — GADOBUTROL 10 ML: 604.72 INJECTION INTRAVENOUS at 11:41

## 2019-02-28 RX ADMIN — ATORVASTATIN CALCIUM 40 MG: 40 TABLET, FILM COATED ORAL at 21:50

## 2019-02-28 RX ADMIN — OXYCODONE HYDROCHLORIDE 10 MG: 5 TABLET ORAL at 10:08

## 2019-02-28 RX ADMIN — ACETAMINOPHEN 1000 MG: 500 TABLET, FILM COATED ORAL at 14:12

## 2019-02-28 RX ADMIN — CLOPIDOGREL BISULFATE 75 MG: 75 TABLET, FILM COATED ORAL at 08:31

## 2019-02-28 RX ADMIN — HYDROMORPHONE HYDROCHLORIDE 1 MG: 1 INJECTION, SOLUTION INTRAMUSCULAR; INTRAVENOUS; SUBCUTANEOUS at 05:39

## 2019-02-28 RX ADMIN — KETOROLAC TROMETHAMINE 30 MG: 15 INJECTION, SOLUTION INTRAMUSCULAR; INTRAVENOUS at 02:10

## 2019-02-28 RX ADMIN — OXYCODONE HYDROCHLORIDE 10 MG: 5 TABLET ORAL at 05:24

## 2019-02-28 RX ADMIN — OXYCODONE HYDROCHLORIDE 10 MG: 5 TABLET ORAL at 14:12

## 2019-02-28 ASSESSMENT — MIFFLIN-ST. JEOR: SCORE: 1580.69

## 2019-02-28 ASSESSMENT — ACTIVITIES OF DAILY LIVING (ADL)
ADLS_ACUITY_SCORE: 13
ADLS_ACUITY_SCORE: 8

## 2019-02-28 NOTE — PLAN OF CARE
VSS. Tele SR. Denies CP, SOB. C/o pain to incision on L hip, gave PRN toradol and oxycodone for pain. Pt went for cardiac MRI. Cards has signed off. Pt transferred to [facility] for further care. Report given to RN. Pt transferred with all belongings. Wife at bedside.

## 2019-02-28 NOTE — PROGRESS NOTES
[facility]    Hospitalist Progress Note    Date of Service (when I saw the patient): 02/28/2019    Assessment & Plan   Ross Smith is a 48 year old male who was admitted on 2/26/2019 following an an STEMI during a left total hip arthroplasty.    1.  STEMI, suspected thromboembolic.  Cardiology assistance appreciated.  Cardiology attempted a transesophageal echocardiogram the patient was unable to tolerate.  Transthoracic echocardiogram demonstrates no evidence for embolic source, EF 40-45% mid to distal lateral, inferolateral and inferior akinesis.  Cardiac MRI to assess for thrombus is pending.  Continue aspirin and Plavix.  Heparin has been discontinued 2/27 due to bleeding.  -Vascular medicine is been consulted to assess for hypercoagulability.  Echo to assess for atrial shunt, as well as hypercoagulable labs have been ordered.  INR and chromogenic factor X have been ordered for the morning.    2.  Left total hip arthroplasty.  Orthopedic surgery assistance appreciated.  X-ray reveals that arthroplasty appears well seated, the patient is okay for weightbearing as tolerated with walker.  PT and OT have been consulted.    3.  Hypertension.  Blood pressure is poorly controlled.  Increase Toprol to 25 mg twice daily and continue amlodipine and hydrochlorothiazide/losartan.    4.  Pain control.  Pain control is improved today.  Continue Tylenol to be given 3 times daily, Dilaudid 2.5-1 mg every 2 hours as needed, and Toradol and Flexeril as needed for pain.      5.  Acute blood loss anemia.  Transfuse for hemoglobin less than 7.    Disposition: Expected discharge in 1-2 days, pending mobilization and therapy recommendations..    DVT Prophylaxis: Pneumatic Compression Devices  Code Status: Full Code    Mg Holley  Text Page (7 am to 6 pm)    Interval History   The patient is resting in bed.  His left hip pain is improved.  He denies chest pain or shortness of breath.    -Data reviewed today: I  reviewed all new labs and imaging results over the last 24 hours. I personally reviewed no images or EKG's today.    Physical Exam   Temp: 98.3  F (36.8  C) Temp src: Oral BP: 134/74 Pulse: 80 Heart Rate: 98 Resp: 20 SpO2: 95 % O2 Device: None (Room air) Oxygen Delivery: 2 LPM  Vitals:    02/26/19 1301 02/27/19 0250 02/28/19 0500   Weight: 72.8 kg (160 lb 7.9 oz) 73.8 kg (162 lb 11.2 oz) 75.2 kg (165 lb 12.8 oz)     Vital Signs with Ranges  Temp:  [98.3  F (36.8  C)-99.2  F (37.3  C)] 98.3  F (36.8  C)  Pulse:  [72-94] 80  Heart Rate:  [] 98  Resp:  [10-30] 20  BP: ()/(64-98) 134/74  SpO2:  [92 %-100 %] 95 %  I/O last 3 completed shifts:  In: 823.75 [P.O.:180]  Out: 1375 [Urine:1375]    Gen: Well nourished, well developed, alert and oriented x 3, no acute distressed  HEENT: Atraumatic, normocephalic  Lungs: Clear to ausculation without wheezes, rhonchi, or rales  Heart: Regular rate and rhythm, no gallops or rubs, no murmurs  GI: Bowel sound normal, no hepatosplenomegaly or masses  Lymph: No lymphadenopathy or edema  Skin: No rashes     Medications     Continuing ACE inhibitor/ARB/ARNI from home medication list OR ACE inhibitor/ARB order already placed during this visit       - MEDICATION INSTRUCTIONS -       - MEDICATION INSTRUCTIONS -       nitroGLYcerin Stopped (02/26/19 1400)     - MEDICATION INSTRUCTIONS -       Percutaneous Coronary Intervention orders placed (this is information for BPA alerting)       sodium chloride       sodium chloride 100 mL/hr at 02/27/19 1617       acetaminophen  1,000 mg Oral Q8H     amLODIPine  10 mg Oral Daily     aspirin  81 mg Oral Daily     atorvastatin  40 mg Oral QPM     clopidogrel  75 mg Oral Daily     losartan-hydrochlorothiazide  1 tablet Oral Daily     metoprolol succinate ER  50 mg Oral Daily     sodium chloride (PF)  3 mL Intracatheter Q8H       Data   Recent Labs   Lab 02/28/19  0523 02/27/19  1832 02/27/19  0533 02/27/19  0215  02/26/19  2140 02/26/19  1437  02/26/19  1302   WBC 8.3  --  11.1*  --   --   --  18.6* 17.4*   HGB 8.3* 7.9* 9.4*  --    < >  --  13.0* 13.2*   MCV 75*  --  72*  --   --   --  73* 74*     --  231  --   --   --  271 267   INR  --   --   --   --   --   --   --  1.01     --  137  --   --   --   --  137   POTASSIUM 4.0  --  3.9  --   --   --   --  4.3   CHLORIDE 108  --  107  --   --   --   --  103   CO2 29  --  24  --   --   --   --  26   BUN 9  --  12  --   --   --   --  10   CR 0.83  --  0.86  --   --   --   --  1.01   ANIONGAP 2*  --  6  --   --   --   --  8   ROBINSON 7.9*  --  8.3*  --   --   --   --  9.2   *  --  125*  --   --   --   --  118*   TROPI  --   --  90.909* 112.935*  --  119.975*  --  0.032    < > = values in this interval not displayed.       No results found for this or any previous visit (from the past 24 hour(s)).

## 2019-02-28 NOTE — PLAN OF CARE
VSS. Pt complaining of pain rated at the highest 8/10 and at best 5/10. Pt observed to be sleeping comfortably in between pain medication. Pt given Oxycodone every 4 hours, asked to be given 1 hour later x1 and pain shot up from being 5/10 to 8/10. Pt received Toradol and Tylenol q6hr. Stressed the importance of only taking IV dilaudid for break through pain. Patient and family were agreeable to pain management plan. L hip dressing remains CDI, hip felt warm to touch, no bruising noted. R groin site soft, no bruising or bruiting noted. CMS intact BLE. Pt daughter spend the night.

## 2019-02-28 NOTE — PLAN OF CARE
Pt has been free of chest pain with V/signs stable and maintaining his O2 sats in the high 90's on 2 L per N/C. Rt groin ( Site of Angio 2/26/19) is without hematoma or bruit with angio seal dressing clean and dry. Pt did not tolerate HALIMA this morning so Cardiac MRI will be done tomorrow. Lt Hip ( total hip 2/26/19) dressing intact without any drainage. No numbness noted per lower extremities. Pt has had difficulty with pain control so he has received Dilaudid 1mg every 2 hours along with Toradol 30 mg given at 12:59 ( every 6 hours PRN) and 1000mg of Tylenol at 13:44 ( scheduled every 8 hours) with next dose due at 22:00. Tolerated diet well.  Cardiac and PT therapy held today. Am HBG 9.4 with recheck HBG at 18:32 7.9 so Dr Teague was notified and will be coming to unit to sign consent for Blood. Type and Cross was done this morning.Remains in NSR /Sinus Tach

## 2019-02-28 NOTE — PLAN OF CARE
Discharge Planner PT   Patient plan for discharge: Return home  Current status: 48 year old male who was admitted on 2/26/2019 following an an STEMI during a left total hip arthroplasty. Pt was previously living in a house with his family, with 3 steps to enter and no stairs within. Pt was IND with all functional mobility and ADLs.     Orders received, eval completed, treatment initiated. Pt currently requests min A for supine to sit to manage L LE and CGA for STS and gait 150' with 2WW. Pt reports increased pain with ambulation, VSS stable pre and post-activity. Pt demonstrates understanding of supine hip exercises.   Barriers to return to prior living situation: None  Recommendations for discharge: Home with 2WW, hip HEP, and phase II OP CR   Rationale for recommendations: Pt safe to return home with use of 2WW for all mobility, continuation of hip HEP, and phase II OP CR        Entered by: Drea Romero 02/28/2019 2:12 PM

## 2019-02-28 NOTE — PROGRESS NOTES
" 02/28/19 1300   Quick Adds   Type of Visit Initial PT Evaluation   Living Environment   Lives With child(agusto), dependent;spouse   Living Arrangements house   Home Accessibility stairs to enter home   Number of Stairs, Main Entrance three   Stair Railings, Main Entrance none   Transportation Anticipated family or friend will provide   Self-Care   Usual Activity Tolerance good   Current Activity Tolerance moderate   Regular Exercise No   Equipment Currently Used at Home none   Functional Level Prior   Ambulation 0-->independent   Transferring 0-->independent   Toileting 0-->independent   Bathing 0-->independent   Fall history within last six months no   Which of the above functional risks had a recent onset or change? ambulation;transferring;toileting;bathing   Prior Functional Level Comment Pt was IND with all functional mobility and ADLs   General Information   Onset of Illness/Injury or Date of Surgery - Date 02/26/19   Referring Physician Flor Estrella PA-C   Patient/Family Goals Statement Return home   Pertinent History of Current Problem (include personal factors and/or comorbidities that impact the POC) 48 year old male who was admitted on 2/26/2019 following an an STEMI during a left total hip arthroplasty; s/p angioplasty and aspiration thrombectomy of OM-2   Precautions/Limitations fall precautions   Weight-Bearing Status - LLE weight-bearing as tolerated   General Observations Pt supine in bed upon arrival, agreeable to PT   General Info Comments Activity orders: ambulate with assist; WBAT and \"no precautions\" per chart   Cognitive Status Examination   Orientation orientation to person, place and time   Level of Consciousness alert   Follows Commands and Answers Questions 100% of the time;able to follow multistep instructions   Personal Safety and Judgment intact   Memory intact   Pain Assessment   Patient Currently in Pain Yes, see Vital Sign flowsheet   Posture    Posture Not impaired   Range of " "Motion (ROM)   ROM Comment UE and LE ROM WNL for functional mobility   Strength   Strength Comments LE strength WNL for functional mobility; LLE limited by postop pain   Bed Mobility   Bed Mobility Comments Pt requests min A to manage L LE during supine to sit, SBA for sit to supine   Transfer Skills   Transfer Comments Pt requires CGA for STS from EOB to 2WW   Gait   Gait Comments Pt requires CGA for ambulation 10' with 2WW, WBAT on L LE, reports increased L hip pain with gait   Balance   Balance Comments Pt able to balance seated at EOB with SBA   Sensory Examination   Sensory Perception Comments Pt denies N/T in extremities   General Therapy Interventions   Planned Therapy Interventions balance training;bed mobility training;gait training;strengthening;transfer training;home program guidelines;progressive activity/exercise;risk factor education   Clinical Impression   Criteria for Skilled Therapeutic Intervention yes, treatment indicated   PT Diagnosis Difficulty with functional mobility   Influenced by the following impairments Pain, reduced activity tolerance, weakness, fall risk   Functional limitations due to impairments Reduced safety and IND with mobility   Clinical Presentation Stable/Uncomplicated   Clinical Presentation Rationale Clinically improving   Clinical Decision Making (Complexity) Low complexity   Therapy Frequency` 2 times/day   Predicted Duration of Therapy Intervention (days/wks) 4 days   Anticipated Equipment Needs at Discharge front wheeled walker   Anticipated Discharge Disposition Home with Outpatient Therapy   Risk & Benefits of therapy have been explained Yes   Patient, Family & other staff in agreement with plan of care Yes   [facility] [employer] TM \"6 Clicks\"   2016, Trustees of [facility], under license to [employer].  All rights reserved.   6 Clicks Short Forms Basic Mobility Inpatient Short Form   [facility] AM-PAC  \"6 Clicks\" V.2 Basic Mobility Inpatient Short " Form   1. Turning from your back to your side while in a flat bed without using bedrails? 4 - None   2. Moving from lying on your back to sitting on the side of a flat bed without using bedrails? 3 - A Little   3. Moving to and from a bed to a chair (including a wheelchair)? 3 - A Little   4. Standing up from a chair using your arms (e.g., wheelchair, or bedside chair)? 3 - A Little   5. To walk in hospital room? 3 - A Little   6. Climbing 3-5 steps with a railing? 3 - A Little   Basic Mobility Raw Score (Score out of 24.Lower scores equate to lower levels of function) 19   Total Evaluation Time   Total Evaluation Time (Minutes) 11

## 2019-02-28 NOTE — PROGRESS NOTES
[facility]    Cardiology Progress Note    Date of Service: 02/28/2019     Assessment & Plan   Ross Smith is a 48 year old male with past medical history of HTN, ETOH abuse, arthiritis.  This patient was admitted on 2/26/2019 with symptoms of inferior MI with left his arthroplasty.      1. Inferior ST elevation myocardial infarction   Mild CM EF 40-45% with lateral, inferolateral, inferior AK  Aspiration thrombectomy to OM-2 for thrombotic occlusion  No other significant disease  Peak troponin 119, decreased to 90.  No chest pain  On aspirin and Plavix  Was on heparin but because of drop in hemoglobin by 4g, we had to discontinue it. -beta-blockers.  -losartan HCT   -creatinine normal  Etiologies possibly embolic after the hip procedure or related to hypertension and coronary artery obstruction.  He had plain angioplasty and aspiration thrombectomy.  We attempted a transesophageal echocardiogram but he could not tolerate  CMR  Today  -consider hypercoag panel if hosptialist agrees and could order  -will need event recorder to look for atrial fibrillaiton  -cardiac rehab/PT as hip allows    2. HTN controlled    3.  HDL 58 TG 52  Atorvastatin 40mg daily    4.  Status post left hip arthroplasty, in significant discomfort  This is his main issue.  He is getting several pain medications and injectables.  Management per primary team and orthopedics.     5. Drop in hemoglobin to 7.9--8.3  -one unit RBC's  -no obvious hip hematoma    Carson Johnson, MSN, APRN, CNP  [facility]    Interval History   No chest pain    Review of Systems:  The Review of Systems is negative other than noted in the HPI    Physical Exam   Temp: 98.5  F (36.9  C) Temp src: Oral BP: (!) 122/96 Pulse: 85 Heart Rate: 81 Resp: 12 SpO2: 100 % O2 Device: Nasal cannula Oxygen Delivery: 2 LPM  Vitals:    02/26/19 1301 02/27/19 0250 02/28/19 0500   Weight: 72.8 kg (160 lb 7.9 oz) 73.8 kg (162 lb 11.2 oz) 75.2 kg (165 lb 12.8  oz)     Vital Signs with Ranges  Temp:  [98.3  F (36.8  C)-99.2  F (37.3  C)] 98.5  F (36.9  C)  Pulse:  [] 85  Heart Rate:  [] 81  Resp:  [10-30] 12  BP: ()/(64-98) 122/96  SpO2:  [95 %-100 %] 100 %  I/O last 3 completed shifts:  In: 823.75 [P.O.:180]  Out: 1375 [Urine:1375]    Constitutional     alert and oriented, in no acute distress.     Skin     warm and dry to touch    ENT     no pallor or cyanosis    Neck    Supple, JVP normal, no carotid bruit    Chest     no tenderness to palpation     Lungs  clear to auscultation     Cardiac  regular rhythm, S1 normal, S2 normal, No S3 +S4, no murmurs, no rubs    Abdomen     abdomen soft, bowel sounds normoactive, no hepatosplenomegaly    Extremities and Back     no clubbing, cyanosis. No edema observed.        Neurological     no gross motor deficits noted, affect appropriate, oriented to time, person and place.        Medications     Continuing ACE inhibitor/ARB/ARNI from home medication list OR ACE inhibitor/ARB order already placed during this visit       - MEDICATION INSTRUCTIONS -       - MEDICATION INSTRUCTIONS -       nitroGLYcerin Stopped (02/26/19 1400)     - MEDICATION INSTRUCTIONS -       Percutaneous Coronary Intervention orders placed (this is information for BPA alerting)       sodium chloride       sodium chloride 100 mL/hr at 02/27/19 1617       acetaminophen  1,000 mg Oral Q8H     amLODIPine  10 mg Oral Daily     aspirin  81 mg Oral Daily     atorvastatin  40 mg Oral QPM     clopidogrel  75 mg Oral Daily     losartan-hydrochlorothiazide  1 tablet Oral Daily     metoprolol succinate ER  50 mg Oral Daily     sodium chloride (PF)  3 mL Intracatheter Q8H          Data:     ROUTINE IP LABS (Last four results)  BMP  Recent Labs   Lab 02/28/19  0523 02/27/19  0533 02/26/19  1302    137 137   POTASSIUM 4.0 3.9 4.3   CHLORIDE 108 107 103   ROBINSON 7.9* 8.3* 9.2   CO2 29 24 26   BUN 9 12 10   CR 0.83 0.86 1.01   * 125* 118*      CHOLESTEROL/HEPATIC  Recent Labs   Lab 02/26/19  1436   CHOL 178   TRIG 52   *   HDL 58     CBC  Recent Labs   Lab 02/28/19  0523  02/27/19  0533  02/26/19  1436 02/26/19  1302   WBC 8.3  --  11.1*  --  18.6* 17.4*   RBC 3.42*  --  3.94*  --  5.41 5.52   HGB 8.3*   < > 9.4*   < > 13.0* 13.2*   HCT 25.7*  --  28.4*  --  39.7* 40.7   MCV 75*  --  72*  --  73* 74*   MCH 24.3*  --  23.9*  --  24.0* 23.9*   MCHC 32.3  --  33.1  --  32.7 32.4   RDW 15.6*  --  15.1*  --  15.4* 15.4*     --  231  --  271 267    < > = values in this interval not displayed.     TROP:   Recent Labs   Lab 02/27/19  0533 02/27/19  0215 02/26/19  2140 02/26/19  1302   TROPI 90.909* 112.935* 119.975* 0.032      BNP:  No results for input(s): NTBNPI in the last 168 hours.  INR  Recent Labs   Lab 02/26/19  1302   INR 1.01     No results found for: TSH    EKG results:  Reviewed if available     Imaging:  No results found for this or any previous visit (from the past 24 hour(s)).  Telemetry:  sinus

## 2019-02-28 NOTE — PLAN OF CARE
Discharge Planner OT   Patient plan for discharge: did not obtain  Current status: OT/cardiac rehab orders received, chart reviewed on 47yo male direct admit from [facility] with STEMI occurring during a left total hip arthroplasty. In consult with physical therapy, it was determined that patient would be best served by having physical therapy see patient for cardiac rehab as they will be addressing post-op total hip mobility training which includes ambulation and stair activity. OT will sign off CR order and PT will include in their POC. Should OT services be needed for ADL retraining, PT will request new orders.   Barriers to return to prior living situation: see PT note  Recommendations for discharge: see PT note following assessment  Rationale for recommendations: see PT note       Entered by: Jayme Cartwright 02/28/2019 10:12 AM

## 2019-02-28 NOTE — PROGRESS NOTES
Orthopedic Surgery  Ross Smith  2019  Admit Date:  2019  POD 2 Days Post-Op  S/P Left total hip arthroplasty    Patient resting comfortably in bed.    Pain controlled.  Tolerating oral intake.    Denies nausea or vomiting  Denies chest pain or shortness of breath  No events overnight.     Alert and orient to person, place, and time.  Vital Sign Ranges  Temperature Temp  Av.7  F (37.1  C)  Min: 98.3  F (36.8  C)  Max: 99.2  F (37.3  C)   Blood pressure Systolic (24hrs), Av , Min:97 , Max:133        Diastolic (24hrs), Av, Min:64, Max:98      Pulse Pulse  Av  Min: 72  Max: 117   Respirations Resp  Avg: 15.8  Min: 10  Max: 30   Pulse oximetry SpO2  Av.5 %  Min: 95 %  Max: 100 %       Dressing is clean, dry, and intact.   Minimal erythema of the surrounding skin.   Moderate hematoma noted at distal incision, stable from yesterday, non-TTP  Bilateral calves are soft, non-tender.  Bilateral lower extremity is NVI.  Sensation intact bilateral lower extremities  5/5 motor with resisted dorsi and plantar flexion bilaterally  +Dp pulse      Labs:  Recent Labs   Lab Test 19  0523 19  0533 19  1302   POTASSIUM 4.0 3.9 4.3     Recent Labs   Lab Test 19  0523 19  1832 19  0533   HGB 8.3* 7.9* 9.4*     Recent Labs   Lab Test 19  1302   INR 1.01     Recent Labs   Lab Test 19  0523 19  0533 19  1436    231 271       A/P  1. Plan   Continue ASA and Plavix for DVT prophylaxis.     Mobilize with PT/OT    WBAT with walker.  Encourage OOB activity for hip - very helpful to simply walk   Continue current pain regiment.   Ice over left lateral thigh   Continue Hgb checks - transfuse if under 7.0    2. Disposition   Anticipate d/c to home based on medical clearance.    Flor Estrella PA-C

## 2019-02-28 NOTE — PROGRESS NOTES
X-Cover:  Called regarding low hemoglobin.  Discussed with staff, chart reviewed.  Perioperative STEMI, heparin stopped today due to falling hemoglobin (likely post op blood loss.)  Most recent hgb 7.9 down from 9.4 this morning.  Given recent coronary event and ongoing blood loss, transfusion is warranted.    Patient consented, will transfuse one unit pRBCs.    Beckie Teague MD

## 2019-02-28 NOTE — CONSULTS
[facility]    Vascular Medicine Consultation     Attestation: I have examined the patient independently of Cynthia Cuenca PA-C and agree with the examination and plan as delineated below.    Clarence Dunn MD      Date of Admission:  2/26/2019  Date of Consult (When I saw the patient): 02/28/19    Assessment & Plan   1. Inferior STEMI christopher-operatively with noted normal coronary arteries on angiogram treated with aspiration thrombectomy 2/26/19    This appears to be a thromboembolic rather than atheroembolic myocardial infarction. Therefore, a hypercoagulable work-up will be undertaken on him (lupus inhibitor, beta-2 glycoprotein IgG and IgM, Cardioplipin IgG and IgM, Protein C and S, Antithrombin III, Factor 2 and 5). Additionally, INR and chromogenic factor 10 will also be checked to see if they correlate. He did have a TTE already, but no bubble study was performed. This will be repeated, but with a bubble study to evaluate for any PFO. If a PFO is present, then a venous duplex should be undertaken of his lower extremities.     He has been started on dual antiplatelet therapy with aspirin and Plavix. He had been on IV heparin, but this was stopped due to a drop in his hemoglobin by 4 grams. Dual antiplatelet therapy will be continued for now. However, if he does have evidence of a PFO on echocardiogram and has documented DVT, then anticoagulation would be indicated. Additionally, anticoagulation would also be indicated if his INR and chromogenic factor 10 do not correlate as this could indicate that he has an antiphospholipid antibody causing him to be more prone to clotting. Cardiology has recommended an event monitor upon discharge to look for atrial fibrillation. He is unlikely to have this as his left atrium is not dialted. He will be seen by Dr. Dunn in two weeks as an outpatient to go over his thrombophilia evaluation.     2. Marijuana use    He admits to smoking marijuana  recreationally. It was advised that he stop this.     3. Hyperlipidemia    He was just started on Atorvastatin 40 mg daily. He should continue the same and have a lipid panel rechecked in 3 months through his primary care provider with a goal LDL of less than 70.     4. Hypertension    His blood pressure has been monitored and is presently under control. He should continue his prior to admission metoprolol, amlodipine, and losartan-hydrochlorothiazide.     5. Osteoarthritis of left hip s/p arthroplasty left hip 2/26/19    Post-operative management per Orthopedic Surgery.       Reason for Consult   Reason for consult: Asked by Dr. Holley to assist with evaluation and management of this 48 year old male who is s/p christopher-operative thromboembolic inferior STEMI while undergoing a left total hip arthroplasty treated with aspiration thrombectomy with noted normal coronary arteries and no left atrial dilatation.     Primary Care Physician   Taqueria Sherman      History of Present Illness   Ross Smith is a 48 year old male with a history significant for marijuana use, hypertension, and arthritis who underwent a left total hip arthroplasty at [facility]. Intra-operatively he became hypotensive and had some EKG changes. Post-operatively, his EKG demonstrated inferior STEMI pattern and he complained of chest pain. He was transported emergently to [facility] as this was the closest medical facility. Coronary angiography was emergently performed via right femoral access and occlusive disease was noted in a very distal obtuse marginal branch with a cut off sign. However, no evidence of plaque rupture was noted.  Balloon angioplasty was performed sequentially with a 2.25 x 12 emerge balloon, several therapies were applied.  There appeared to be propagation of the thrombus noted and installation of adenosine in the distal micro vascular bed was then performed.  With this, restoration of WANDA-3 flow was  noted.  There appeared to be no plaque rupture MI or any other additional coronary artery disease noted.  He was given dual antiplatelet therapy with a bolus of Plavix. Aspirin had already been given in the emergency room.  Due to the appearance of the lesion, there is a concern for embolic disease via low flow situation from hypotension and obstructive coronary disease versus proximal atrial fibrillation and/or left atrial appendage thrombus.  EKG had improved and patient was chest pain-free. He was taken to CCU for additional care.    A HALIMA was attempted to evaluate further, but this was aborted as the patient could not tolerate it. A cardiac MRI was ordered. A TTE did reveal an EF of 40-45% and no atrial enlargement, but no bubble study was performed. He has improved clinically and was just transferred to the floor.           Past Medical History   Hypertension  Insomnia  Pre-diabetes  Vitamin D. Deficiency  Renal cyst  Alcohol use  Marijuana use  Microcytic anemia  Osteoarthritis    Past Surgical History   Past Surgical History:   Procedure Laterality Date     CV HEART CATHETERIZATION WITH POSSIBLE INTERVENTION N/A 2/26/2019    Procedure: Heart Catheterization;  Surgeon: Nu Henry MD;  Location: [facility] CARDIAC CATH LAB   left hip arthroplasty 2/26/19    Prior to Admission Medications   Prior to Admission Medications   Prescriptions Last Dose Informant Patient Reported? Taking?   amLODIPine (NORVASC) 10 MG tablet 2/26/2019 at Unknown time  Yes Yes   Sig: Take 10 mg by mouth daily   losartan-hydrochlorothiazide (HYZAAR) 100-25 MG tablet 2/26/2019 at Unknown time  Yes Yes   Sig: Take 1 tablet by mouth daily   meloxicam (MOBIC) 15 MG tablet 2/26/2019 at Unknown time  Yes Yes   Sig: Take 15 mg by mouth daily   metoprolol succinate ER (TOPROL-XL) 25 MG 24 hr tablet 2/26/2019 at Unknown time  Yes Yes   Sig: Take 25 mg by mouth daily      Facility-Administered Medications: None     Allergies   No Known  Allergies    Social History   Ross Smith  reports that he drinks alcohol. He does not smoke cigarettes, but he does smoke marijuana and cigars occasionally. He last smoked marijuana on 2/24/19. 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     Family History   History reviewed. No pertinent family history.    Review of Systems   The 10 point Review of Systems is negative other than noted in the HPI or here.     Physical Exam   Temp: 98.3  F (36.8  C) Temp src: Oral BP: 134/74 Pulse: 80 Heart Rate: 98 Resp: 20 SpO2: 95 % O2 Device: None (Room air) Oxygen Delivery: 2 LPM  Vital Signs with Ranges  Temp:  [98.3  F (36.8  C)-99.2  F (37.3  C)] 98.3  F (36.8  C)  Pulse:  [] 80  Heart Rate:  [] 98  Resp:  [10-30] 20  BP: ()/(64-98) 134/74  SpO2:  [92 %-100 %] 95 %  165 lbs 12.8 oz    Constitutional: awake, alert, cooperative, no apparent distress, and appears stated age  Eyes: Lids and lashes normal, pupils equal, round and reactive to light, extra ocular muscles intact, sclera clear, conjunctiva normal  ENT: normocepalic, without obvious abnormality, oropharynx pink and moist  Hematologic / Lymphatic: no lymphadenopathy  Respiratory: No increased work of breathing, good air exchange, clear to auscultation bilaterally, no crackles or wheezing  Cardiovascular: regular rate and rhythm, normal S1 and S2 and no murmur noted  GI: Normal bowel sounds, soft, non-distended, non-tender  Skin: no redness, warmth, or swelling, no rashes. Surgical incision site left hip bandaged.   Musculoskeletal: There is no redness, warmth, or swelling of the joints.  Full range of motion noted.  Motor strength is 5 out of 5 all extremities bilaterally.  Tone is normal.  Neurologic: Awake, alert, oriented to name, place and time.  Cranial nerves II-XII are grossly intact.  Motor is 5 out of 5 bilaterally.    Neuropsychiatric:  Normal affect, memory, insight.      Data   Most Recent 3 CBC's:  Recent Labs   Lab Test  02/28/19  0523 02/27/19  1832 02/27/19  0533  02/26/19  1436   WBC 8.3  --  11.1*  --  18.6*   HGB 8.3* 7.9* 9.4*   < > 13.0*   MCV 75*  --  72*  --  73*     --  231  --  271    < > = values in this interval not displayed.     Most Recent 3 BMP's:  Recent Labs   Lab Test 02/28/19  0523 02/27/19  0533 02/26/19  1302    137 137   POTASSIUM 4.0 3.9 4.3   CHLORIDE 108 107 103   CO2 29 24 26   BUN 9 12 10   CR 0.83 0.86 1.01   ANIONGAP 2* 6 8   ROBINSON 7.9* 8.3* 9.2   * 125* 118*     Most Recent 3 INR's:  Recent Labs   Lab Test 02/26/19  1302   INR 1.01     Most Recent Cholesterol Panel:  Recent Labs   Lab Test 02/26/19  1436   CHOL 178   *   HDL 58   TRIG 52     Most Recent Hemoglobin A1c:  Recent Labs   Lab Test 02/27/19  0533   A1C 5.9*

## 2019-03-01 ENCOUNTER — APPOINTMENT (OUTPATIENT)
Dept: ULTRASOUND IMAGING | Facility: CLINIC | Age: 49
End: 2019-03-01
Attending: PHYSICIAN ASSISTANT
Payer: COMMERCIAL

## 2019-03-01 ENCOUNTER — APPOINTMENT (OUTPATIENT)
Dept: PHYSICAL THERAPY | Facility: CLINIC | Age: 49
End: 2019-03-01
Payer: COMMERCIAL

## 2019-03-01 VITALS
DIASTOLIC BLOOD PRESSURE: 64 MMHG | OXYGEN SATURATION: 95 % | WEIGHT: 166.45 LBS | RESPIRATION RATE: 16 BRPM | BODY MASS INDEX: 26.12 KG/M2 | HEIGHT: 67 IN | HEART RATE: 108 BPM | SYSTOLIC BLOOD PRESSURE: 117 MMHG | TEMPERATURE: 99.2 F

## 2019-03-01 LAB
AT III ACT/NOR PPP CHRO: 100 % (ref 85–135)
B2 GLYCOPROT1 IGG SERPL IA-ACNC: <0.6 U/ML
B2 GLYCOPROT1 IGM SERPL IA-ACNC: 1.2 U/ML
CARDIOLIPIN ANTIBODY IGG: <1.6 GPL-U/ML (ref 0–19.9)
CARDIOLIPIN ANTIBODY IGM: 0.3 MPL-U/ML (ref 0–19.9)
FACT X ACT/NOR PPP CHRO: 99 % (ref 70–130)
HGB BLD-MCNC: 9.2 G/DL (ref 13.3–17.7)
PROT C ACT/NOR PPP CHRO: 120 % (ref 70–170)
PROT S FREE AG ACT/NOR PPP IA: 104 % (ref 70–148)

## 2019-03-01 PROCEDURE — 93970 EXTREMITY STUDY: CPT

## 2019-03-01 PROCEDURE — 25000132 ZZH RX MED GY IP 250 OP 250 PS 637: Performed by: INTERNAL MEDICINE

## 2019-03-01 PROCEDURE — 99233 SBSQ HOSP IP/OBS HIGH 50: CPT | Performed by: INTERNAL MEDICINE

## 2019-03-01 PROCEDURE — 97530 THERAPEUTIC ACTIVITIES: CPT | Mod: GP

## 2019-03-01 PROCEDURE — 85018 HEMOGLOBIN: CPT | Performed by: INTERNAL MEDICINE

## 2019-03-01 PROCEDURE — 97116 GAIT TRAINING THERAPY: CPT | Mod: GP

## 2019-03-01 PROCEDURE — 25000132 ZZH RX MED GY IP 250 OP 250 PS 637: Performed by: PHYSICIAN ASSISTANT

## 2019-03-01 PROCEDURE — 99232 SBSQ HOSP IP/OBS MODERATE 35: CPT | Performed by: INTERNAL MEDICINE

## 2019-03-01 PROCEDURE — 99239 HOSP IP/OBS DSCHRG MGMT >30: CPT | Performed by: INTERNAL MEDICINE

## 2019-03-01 PROCEDURE — 25000128 H RX IP 250 OP 636: Performed by: PHYSICIAN ASSISTANT

## 2019-03-01 PROCEDURE — 97110 THERAPEUTIC EXERCISES: CPT | Mod: GP

## 2019-03-01 PROCEDURE — 36415 COLL VENOUS BLD VENIPUNCTURE: CPT | Performed by: INTERNAL MEDICINE

## 2019-03-01 RX ORDER — ACETAMINOPHEN 500 MG
1000 TABLET ORAL EVERY 8 HOURS
Qty: 60 TABLET | COMMUNITY
Start: 2019-03-01 | End: 2019-03-13

## 2019-03-01 RX ORDER — HYDROXYZINE HYDROCHLORIDE 10 MG/1
10 TABLET, FILM COATED ORAL 3 TIMES DAILY PRN
Qty: 30 TABLET | Refills: 0 | Status: SHIPPED | OUTPATIENT
Start: 2019-03-01 | End: 2023-12-15

## 2019-03-01 RX ORDER — METOPROLOL SUCCINATE 50 MG/1
50 TABLET, EXTENDED RELEASE ORAL DAILY
Qty: 30 TABLET | Refills: 0 | Status: SHIPPED | OUTPATIENT
Start: 2019-03-02 | End: 2019-03-19

## 2019-03-01 RX ORDER — METHOCARBAMOL 500 MG/1
500 TABLET, FILM COATED ORAL 4 TIMES DAILY PRN
Qty: 30 TABLET | Refills: 0 | Status: SHIPPED | OUTPATIENT
Start: 2019-03-01

## 2019-03-01 RX ORDER — CLOPIDOGREL BISULFATE 75 MG/1
75 TABLET ORAL DAILY
Qty: 30 TABLET | Refills: 0 | Status: SHIPPED | OUTPATIENT
Start: 2019-03-02 | End: 2019-03-19

## 2019-03-01 RX ORDER — NITROGLYCERIN 0.4 MG/1
TABLET SUBLINGUAL
Qty: 20 TABLET | Refills: 0 | Status: SHIPPED | OUTPATIENT
Start: 2019-03-01

## 2019-03-01 RX ORDER — ATORVASTATIN CALCIUM 40 MG/1
40 TABLET, FILM COATED ORAL EVERY EVENING
Qty: 30 TABLET | Refills: 0 | Status: SHIPPED | OUTPATIENT
Start: 2019-03-01 | End: 2019-03-19

## 2019-03-01 RX ORDER — OXYCODONE HYDROCHLORIDE 5 MG/1
5-10 TABLET ORAL EVERY 4 HOURS PRN
Qty: 30 TABLET | Refills: 0 | Status: SHIPPED | OUTPATIENT
Start: 2019-03-01 | End: 2020-01-16

## 2019-03-01 RX ADMIN — METOPROLOL SUCCINATE 50 MG: 50 TABLET, EXTENDED RELEASE ORAL at 08:00

## 2019-03-01 RX ADMIN — AMLODIPINE BESYLATE 10 MG: 10 TABLET ORAL at 08:00

## 2019-03-01 RX ADMIN — LOSARTAN POTASSIUM AND HYDROCHLOROTHIAZIDE 1 TABLET: 100; 25 TABLET, FILM COATED ORAL at 08:00

## 2019-03-01 RX ADMIN — ASPIRIN 81 MG: 81 TABLET, COATED ORAL at 08:00

## 2019-03-01 RX ADMIN — KETOROLAC TROMETHAMINE 30 MG: 15 INJECTION, SOLUTION INTRAMUSCULAR; INTRAVENOUS at 14:07

## 2019-03-01 RX ADMIN — OXYCODONE HYDROCHLORIDE 10 MG: 5 TABLET ORAL at 07:51

## 2019-03-01 RX ADMIN — POLYETHYLENE GLYCOL 3350 17 G: 17 POWDER, FOR SOLUTION ORAL at 06:37

## 2019-03-01 RX ADMIN — ACETAMINOPHEN 1000 MG: 500 TABLET, FILM COATED ORAL at 14:07

## 2019-03-01 RX ADMIN — HYDROXYZINE HYDROCHLORIDE 10 MG: 10 TABLET ORAL at 14:07

## 2019-03-01 RX ADMIN — OXYCODONE HYDROCHLORIDE 10 MG: 5 TABLET ORAL at 16:48

## 2019-03-01 RX ADMIN — CLOPIDOGREL BISULFATE 75 MG: 75 TABLET, FILM COATED ORAL at 08:00

## 2019-03-01 RX ADMIN — OXYCODONE HYDROCHLORIDE 10 MG: 5 TABLET ORAL at 12:03

## 2019-03-01 RX ADMIN — ACETAMINOPHEN 1000 MG: 500 TABLET, FILM COATED ORAL at 05:43

## 2019-03-01 ASSESSMENT — ACTIVITIES OF DAILY LIVING (ADL)
ADLS_ACUITY_SCORE: 8

## 2019-03-01 ASSESSMENT — MIFFLIN-ST. JEOR: SCORE: 1583.63

## 2019-03-01 NOTE — DISCHARGE SUMMARY
"Discharge Summary    Ross Smith MRN# [id number]   YOB: 1970 Age: 48 year old     Date of Admission:  2/26/2019  Date of Discharge:  3/1/2019  Admitting Physician:  Mg Holley MD  Discharge Physician:  Mg Holley MD  Discharging Service:  Hospitalist     Primary Provider: Taqueria Sherman          Admission Diagnoses:   ST elevation myocardial infarction (STEMI), unspecified artery (H) [I21.3]  STEMI (ST elevation myocardial infarction) (H) [I21.3]          Discharge Diagnosis:   Patient Active Problem List   Diagnosis     STEMI (ST elevation myocardial infarction) (H)             Condition on Discharge:       Discharge vitals: Blood pressure 118/82, pulse 107, temperature 98.9  F (37.2  C), temperature source Oral, resp. rate 16, height 1.702 m (5' 7\"), weight 75.5 kg (166 lb 7.2 oz), SpO2 98 %.         Gen: Well nourished, well developed, alert and oriented x 3, no acute distressed  HEENT: Atraumatic, normocephalic  Lungs: Clear to ausculation without wheezes, rhonchi, or rales  Heart: Regular rate and rhythm, no gallops or rubs, no murmurs  GI: Bowel sound normal, no hepatosplenomegaly or masses  Lymph: No lymphadenopathy or edema  Skin: No rashes          Procedures / Labs / Imaging:   Most Recent 3 CBC's:  Recent Labs   Lab Test 03/01/19  0701 02/28/19  0523 02/27/19  1832 02/27/19  0533  02/26/19  1436   WBC  --  8.3  --  11.1*  --  18.6*   HGB 9.2* 8.3* 7.9* 9.4*   < > 13.0*   MCV  --  75*  --  72*  --  73*   PLT  --  178  --  231  --  271    < > = values in this interval not displayed.      Most Recent 3 BMP's:  Recent Labs   Lab Test 02/28/19  0523 02/27/19  0533 02/26/19  1302    137 137   POTASSIUM 4.0 3.9 4.3   CHLORIDE 108 107 103   CO2 29 24 26   BUN 9 12 10   CR 0.83 0.86 1.01   ANIONGAP 2* 6 8   ROBINSON 7.9* 8.3* 9.2   * 125* 118*     Most Recent 3 Troponin's:  Recent Labs   Lab Test 02/27/19  0533 02/27/19  0215 02/26/19  2140   TROPI 90.909* 112.935* " 119.975*     Most Recent 3 INR's:  Recent Labs   Lab Test 19  1528 19  1302   INR 1.07 1.01     Most Recent 2 LFT's:No lab results found.  Most Recent Cholesterol Panel:  Recent Labs   Lab Test 19  1436   CHOL 178   *   HDL 58   TRIG 52     Most Recent 6 Bacteria Isolates From Any Culture (See EPIC Reports for Culture Details):No lab results found.  Most Recent TSH, T4 and HgbA1c:   Recent Labs   Lab Test 19  0533   A1C 5.9*     Results for orders placed or performed during the hospital encounter of 19   XR Chest Port 1 View    Narrative    XR CHEST PORT 1 VW 2019 1:05 PM    COMPARISON: None.    HISTORY: ST elevation myocardial infarction.      Impression    IMPRESSION: Platelike atelectasis laterally in the RIGHT midlung and  RIGHT base. Lungs are otherwise clear. No pleural effusion or  pneumothorax seen on either side. RIGHT axilla within normal limits.    SANDI SIMON MD   XR Pelvis w Hip Port Bilateral    Narrative    PELVIS WITH UNILATERAL HIP ONE VIEW LEFT  2019 6:18 PM     HISTORY: Hip arthroplasty    COMPARISON: None.     FINDINGS:    There is a hip arthroplasty in anatomic alignment with  well-seated components.      Impression    IMPRESSION:   Hip arthroplasty in anatomic alignment.    BEATRICE DA SILVA MD   MRI Cardiac w/contrast    Narrative                                                  [facility]                                                     CMR Report      MRN:                  [id number]                                  Name:             ARIANNA GALLEGO                                   :                  1970                                  Scan Date:   2019 10:20:29                                    Electronically signed by Taylor Williamson  15:55:35    SUMMARY   ==========================================================================================================    Clinical history: Inferior myocardial infarction,  evaluate for intracardiac thrombus  Comparison CMR: None    1. The LV is normal in cavity size and wall thickness. The global systolic function is mildly reduced. The  LVEF is 51%. There is moderate to severe hypokinesis of the mid inferior, inferolateral and anterolateral  walls as well as the apical lateral wall.     2. The RV is normal in cavity size. The global systolic function is normal. The RVEF is 60%.     3. Both atria are normal in size.    4. There is no significant valvular disease.     5. There is extensive subendocardial late enhancement involving the mid inferior, inferolateral and  anterolateral walls as well as the apical lateral and apical inferior walls. Prominent microvascular  obstruction is also noted in these segments.   Collectively, the findings are consistent with a recent myocardial infarction in the circumflex  distribution. Based on the transmural extent of late enhancement and presence of microvascular obstruction,  the likelihood of functional recovery of the infarcted segments is low.     6.  There is no intracardiac thrombus.      CONCLUSIONS: Mild ischemic cardiomyopathy with evidence of recent circumfex distribution infarction. Based  on the transmural extent of late enhancement and presence of microvascular obstruction, the likelihood of  functional recovery of the infarcted segments is low.   No intracardiac thrombus is noted.     CORE EXAM   ==========================================================================================================    MEASUREMENTS   ----------------------------------------------------------------------------------------      VOLUMETRIC ANALYSIS       ----------------------------------------------  .----------------------------------------------------------.                    LV    Reference   RV    Reference    +------+-----------+------+------------+------+------------+   EDV   ml          176   (117-200)   140   (116-216)           ml/m^2     94.6   (64-99)    75.3   ()     ESV   ml           86   (31-76)      56   (29-89)            ml/m^2     46.2   (17-38)    30.1   (16-45)      CO    L/min      7.38              6.89                      L/min/m^2   4.0               3.7                MASS                                                   SV    ml           90   ()     84   ()           ml/m^2     48.4   (42-66)    45.2   (39-71)      EF    %            51   (58-75)      60   (52-77)     '------+-----------+------+------------+------+------------'            CARDIAC OUTPUT HR:  82 BPM    ANATOMY   ----------------------------------------------------------------------------------------      LEFT ATRIUM       ----------------------------------------------          CAVITY SIZE:  Normal         RIGHT ATRIUM       ----------------------------------------------          CAVITY SIZE:  Normal         PERICARDIUM       ----------------------------------------------          Normal           EFFUSION:  Trivial       VALVES   ----------------------------------------------------------------------------------------      AORTIC VALVE       ----------------------------------------------          AORTIC VALVE LEAFLETS:  Trileaflet           AORTIC REGURGITATION:  None           AORTIC STENOSIS:  None         MITRAL VALVE       ----------------------------------------------          MITRAL REGURGITATION:  MILD         TRICUSPID VALVE       ----------------------------------------------          TRICUSPID REGURGITATION:  MILD         PULMONIC VALVE       ----------------------------------------------          PULMONIC REGURGITATION:  None           PULMONIC STENOSIS:  None       17 SEGMENT   ----------------------------------------------------------------------------------------  .-----------------------------------------------------------------------------------------.    Segments            Wall Motion  Hyperenhancement  Stress Perfusion  Interpretation   +--------------------+-------------+------------------+------------------+----------------+   Base Anterior                                                                          Base Anteroseptal                                                                      Base Inferoseptal                                                                      Base Inferior                                                                          Base Inferolateral                                                                     Base Anterolateral                                                                     Mid Anterior                                                                           Mid Anteroseptal                                                                       Mid Inferoseptal                                                                       Mid Inferior                     51-75%                                                Mid Inferolateral                %                                               Mid Anterolateral                51-75%                                                Apical Anterior                                                                        Apical Septal                                                                          Apical Inferior                  26-50%                                                Apical Lateral                   51-75%                                                Stanley                                                                                  +--------------------+-------------+------------------+------------------+----------------+   RV Segments         Wall Motion  Hyperenhancement  Stress Perfusion  Interpretation    +--------------------+-------------+------------------+------------------+----------------+   RV Basal Anterior                                                                      RV Basal Inferior                                                                      RV Mid                                                                                 RV Apical                                                                             '--------------------+-------------+------------------+------------------+----------------'        FINDINGS       ----------------------------------------------          INFARCT/SCAR SIZE:  19 %      SCAN INFO   ==========================================================================================================    GENERAL   ----------------------------------------------------------------------------------------      CONTRAST AGENT       ----------------------------------------------          TYPE:  Gadavist           VOLUME ADMINISTERED:  10 ml          DOSAGE FOR 0.5M:  0.07 mmol/kg        VITALS       ----------------------------------------------          HEIGHT:  67.00 in          WEIGHT:  164.99 lbs          BSA:  1.86 m^2        PULSE SEQUENCES       ----------------------------------------------          Single-Shot SSFP, IR GRE - Segmented, IR SSFP - Single Shot, SSFP Cine         SETUP       ----------------------------------------------          TYPE:  Clinical           INPATIENT:  Yes           INCOMPLETE SCAN:  No           REASON(S) FOR SCAN:  Cardiomyopathy, Thrombus (evaluate for)           REFERRING PHYSICIAN:  SCAR CHRISTIAN           TECHNOLOGIST:  Mehnaz Bullard       Report generated by [employer], a product of [employer]   US Lower Extremity Venous Duplex Bilateral    Narrative    US LOWER EXTREMITY VENOUS DUPLEX BILATERAL 3/1/2019 10:27 AM    CLINICAL HISTORY/INDICATION: Large PFO, recent MI with  thromboemboli  in coronary arteries. Rule out DVT    COMPARISON: None relevant    TECHNIQUE:   Grayscale, color-flow, and spectral waveform analysis were performed  of the deep veins of the bilateral lower extremities    FINDINGS:   The right common femoral vein, femoral vein, popliteal vein and tibial  veins demonstrate normal compressibility, spectral waveform, color  flow and augmentation.    The left common femoral vein, femoral vein, popliteal vein and tibial  veins demonstrate normal compressibility, spectral waveform, color  flow and augmentation.      Impression    IMPRESSION:   1. No evidence of deep venous thrombosis in the right lower extremity.  2. No evidence of deep venous thrombosis in the left lower extremity.    AURORA HINDS DO             Medications Prior to Admission:     Medications Prior to Admission   Medication Sig Dispense Refill Last Dose     amLODIPine (NORVASC) 10 MG tablet Take 10 mg by mouth daily   2/26/2019 at Unknown time     losartan-hydrochlorothiazide (HYZAAR) 100-25 MG tablet Take 1 tablet by mouth daily   2/26/2019 at Unknown time     [DISCONTINUED] meloxicam (MOBIC) 15 MG tablet Take 15 mg by mouth daily   2/26/2019 at Unknown time     [DISCONTINUED] metoprolol succinate ER (TOPROL-XL) 25 MG 24 hr tablet Take 25 mg by mouth daily   2/26/2019 at Unknown time             Discharge Medications:     Current Discharge Medication List      START taking these medications    Details   acetaminophen (TYLENOL) 500 MG tablet Take 2 tablets (1,000 mg) by mouth every 8 hours for 7 days  Qty: 60 tablet    Associated Diagnoses: ST elevation myocardial infarction (STEMI), unspecified artery (H)      aspirin (ASA) 81 MG EC tablet Take 1 tablet (81 mg) by mouth daily  Qty: 30 tablet, Refills: 0    Associated Diagnoses: ST elevation myocardial infarction (STEMI), unspecified artery (H)      atorvastatin (LIPITOR) 40 MG tablet Take 1 tablet (40 mg) by mouth every evening  Qty: 30 tablet,  Refills: 0    Associated Diagnoses: ST elevation myocardial infarction (STEMI), unspecified artery (H)      clopidogrel (PLAVIX) 75 MG tablet Take 1 tablet (75 mg) by mouth daily  Qty: 30 tablet, Refills: 0    Associated Diagnoses: ST elevation myocardial infarction (STEMI), unspecified artery (H)      hydrOXYzine (ATARAX) 10 MG tablet Take 1 tablet (10 mg) by mouth 3 times daily as needed for anxiety or other (pain)  Qty: 30 tablet, Refills: 0    Associated Diagnoses: ST elevation myocardial infarction (STEMI), unspecified artery (H)      methocarbamol (ROBAXIN) 500 MG tablet Take 1 tablet (500 mg) by mouth 4 times daily as needed for muscle spasms  Qty: 30 tablet, Refills: 0    Associated Diagnoses: ST elevation myocardial infarction (STEMI), unspecified artery (H)      nitroGLYcerin (NITROSTAT) 0.4 MG sublingual tablet For chest pain place 1 tablet under the tongue every 5 minutes for 3 doses. If symptoms persist 5 minutes after 1st dose call 911.  Qty: 20 tablet, Refills: 0    Associated Diagnoses: ST elevation myocardial infarction (STEMI), unspecified artery (H)      order for DME Equipment being ordered: Walker Wheels () and Walker ()  Treatment Diagnosis: Impaired gait  Qty: 1 each, Refills: 0    Associated Diagnoses: ST elevation myocardial infarction (STEMI), unspecified artery (H)      oxyCODONE (ROXICODONE) 5 MG tablet Take 1-2 tablets (5-10 mg) by mouth every 4 hours as needed for moderate to severe pain  Qty: 30 tablet, Refills: 0    Associated Diagnoses: ST elevation myocardial infarction (STEMI), unspecified artery (H)         CONTINUE these medications which have CHANGED    Details   metoprolol succinate ER (TOPROL-XL) 50 MG 24 hr tablet Take 1 tablet (50 mg) by mouth daily  Qty: 30 tablet, Refills: 0    Associated Diagnoses: ST elevation myocardial infarction (STEMI), unspecified artery (H)         CONTINUE these medications which have NOT CHANGED    Details   amLODIPine (NORVASC) 10 MG  tablet Take 10 mg by mouth daily      losartan-hydrochlorothiazide (HYZAAR) 100-25 MG tablet Take 1 tablet by mouth daily         STOP taking these medications       meloxicam (MOBIC) 15 MG tablet Comments:   Reason for Stopping:                     Brief History of Illness:   Ross Smith is a 48 year old male who was admitted for ST elevation myocardial infarction.          Hospital Course:   The patient was sent from [facility] orthopedics to the emergency room with chest pain.  He was found to have an ST elevation myocardial infarction and emergently taken to the Cath Lab.  Patient underwent thrombectomy and angioplasty to OM 2.  The patient was placed on aspirin and Plavix post procedure.  He is post procedure care was complicated by hematoma to the wound.  Heparin was discontinued and hemoglobin stabilized.  He had no subsequent chest pain.  Orthopedic surgery was consulted and followed along.  Pelvic x-ray demonstrated a well seated left hip arthroplasty.  Echocardiogram demonstrated ejection fraction of 40-45% with mid to distal lateral inferolateral and inferior akinesis.  Subsequent echocardiogram demonstrated a large anterior atrial septal defect.  Cardiac MRI demonstrated a recent circumflex distribution infarction with low likelihood of functional recovery.  The patient was started on atorvastatin for hyperlipidemia.  Vascular medicine was consulted.  Evaluation for hypercoagulability was unrevealing.  Anticardiolipin antibodies and beta-2 glycoprotein antibodies were negative.  INR, chromogenic factor X, anti-thrombin, protein C and protein S levels were normal.  Lupus anticoagulant and factor II and V mutations are pending at time of discharge.  Lower extremity ultrasounds were negative for DVTs bilaterally.  The patient will follow up with vascular medicine and structural heart clinic for further treatment of PFO.    Greater than 35 minutes were spent in discharge planning.             Pending Results:    Unresulted Labs Ordered in the Past 30 Days of this Admission     Date and Time Order Name Status Description    2/28/2019 1340 Factor 2 And 5 Mutation Analysis In process     2/28/2019 1340 Lupus Anticoagulant Panel In process

## 2019-03-01 NOTE — PLAN OF CARE
VSS on 2 L of oxygen. Denies pain, SOB & CP. CMS intact and drsg C/D/I. IV SL. Voiding adequately per urinal. Tele - SR with occasional PVCs. Progressing well per POC.

## 2019-03-01 NOTE — PROGRESS NOTES
Chart checked patient - hgb stable    From orthopedic perspective ok to discharge   Will leave any formal PT to any prearranged appointments prior to surgery with [facility], at this point encourage simply walking with walker    Follow-up with Dr. Maciej Estrella PAC

## 2019-03-01 NOTE — PLAN OF CARE
Discharge Planner PT   Patient plan for discharge: home  Current status: Pt requires verbal cues for correct technique with bed mobility; modified independent with transfers and FWW; SBA for gait with FWW x 300'. Pt able to ambulate x 8 mins without a rest, VSS see flowsheet, OMNI scale 5/10. Began CR education for healing after heart attack, importance of diet and exercise, role of OP CR, safe activities after heart attack.  Barriers to return to prior living situation: medical status, level of assist, stairs  Recommendations for discharge: home with spouse assist for household tasks, LE HEP, use of FWW, OP CR phase 2  Rationale for recommendations: Pt would benefit from continued skilled intervention for education on heart healthy lifestyle modifications as well as progressive, monitored aerobic exercise.        Entered by: Argentina Ward 03/01/2019 9:29 AM

## 2019-03-01 NOTE — PROGRESS NOTES
[facility]    Vascular Medicine Progress Note    Date of Service (when I saw the patient): 03/01/2019          Physician Supervisory Attestation:   I have reviewed and discussed with the physician assistant their history, physical and plan and independently interviewed and examined Ross Smith and agree with the plan as stated in the physician assistant note.    Reviewed ECHO with bubble study , recent angiogram  and BLE venous duplex .  He has a large PFO with Rt to left early crossing of contrast but no evidence of thrombus.  BLE venous duplex no DVT today  ACLA, Beta 2 GP abs negative, others  pending  INR and CFX correlates doubt he has APLA.  He denies using cocaine but admits smoking marijauna on regular basis.  Heart RRR, lungs CTA  Left hip surgical site dry dressing.    Plan:     Continue statin, asa, Plavix , BB , hyzar, and amlodipine etc.  Will not pursue anticoagulation ( he did not tolerate IV heparin and dropped 4 grams of HGB , POD 3 for hip surgery)  No evidence of deep venous thrombosis   Discussed with Cardiology  today . Ok to go home on dual antiplatelet therapy and follow up in their PFO clinic.     -He will be seen by Dr. Dunn in two weeks as an outpatient to go over his thrombophilia evaluation. Appointment was scheduled for him on 3/13/19 at 2:10pm at [facility].     Advised patient to stop smoking marijuana and cigarette smoking.    Patient care time spent 35 minutes.    Edy Liu MD , [facility]  Vascular Medicine  3/1/2019         Assessment & Plan   1. Inferior STEMI intra-operatively with noted normal coronary arteries on angiogram treated with aspiration thrombectomy 2/26/19     Assessment:   -appears to be a thromboembolic rather than atheroembolic myocardial infarction.   -TTE with bubble study with large PFO.   -Venous duplex lower extremities negative for DVT  -hypercoagulable work-up pending  (lupus inhibitor pending, beta-2 glycoprotein IgG and IgM negative, Cardioplipin IgG and IgM negative, Protein C and S pending, Antithrombin III normal, Factor 2 and 5 pending).  -INR and chromogenic factor 10 correlate.      Plan:   -He has been started on dual antiplatelet therapy with aspirin and Plavix per Cardiology. (He had been on IV heparin, but this was stopped due to a drop in his hemoglobin by 4 grams)  -Cardiology has recommended an event monitor upon discharge to look for atrial fibrillation. He is unlikely to have this as his left atrium is not dialted.   -Discussed with Cardiology. To go home on dual antiplatelet therapy and follow up in their PFO clinic.   -He will be seen by Dr. Dunn in two weeks as an outpatient to go over his thrombophilia evaluation. Appointment was scheduled for him on 3/13/19 at 2:10pm at the [facility].      2. Marijuana use     Assessment: He admits to smoking marijuana recreationally.   Plan: It was advised that he stop this.      3. Hyperlipidemia     Assessment: He was just started on Atorvastatin 40 mg daily this admission.  Plan:  He should continue the same and have a lipid panel rechecked in 3 months through his primary care provider with a goal LDL of less than 70.      4. Hypertension     Assessment: BP decently controlled.   Plan: Continue prior to admission metoprolol, amlodipine, and losartan-hydrochlorothiazide.      5. Osteoarthritis of left hip s/p arthroplasty left hip 2/26/19     Assessment: Doing well.   Plan: Post-operative management per Orthopedic Surgery.          Interval History   Doing well. Pain under control. No real complaints presently.     Physical Exam   Temp: 98.9  F (37.2  C) Temp src: Oral BP: 122/82 Pulse: 107 Heart Rate: 100 Resp: 16 SpO2: 97 % O2 Device: None (Room air) Oxygen Delivery: 2 LPM  Vitals:    02/27/19 0250 02/28/19 0500 03/01/19 0621   Weight: 73.8 kg (162 lb 11.2 oz) 75.2 kg (165 lb  12.8 oz) 75.5 kg (166 lb 7.2 oz)     Vital Signs with Ranges  Temp:  [98.3  F (36.8  C)-100.7  F (38.2  C)] 98.9  F (37.2  C)  Pulse:  [] 107  Heart Rate:  [] 100  Resp:  [16-20] 16  BP: (104-134)/(62-90) 122/82  SpO2:  [95 %-100 %] 97 %  I/O last 3 completed shifts:  In: 900 [P.O.:900]  Out: 1500 [Urine:1500]    Constitutional: Awake, alert, cooperative, no apparent distress, and appears stated age.  Eyes: Lids and lashes normal, sclera clear, conjunctiva normal.  ENT: Normocephalic, without obvious abnormality, atraumatic, oral pharynx with moist mucus membranes  Respiratory: No increased work of breathing, good air exchange, clear to auscultation bilaterally, no crackles or wheezing.  Cardiovascular: Regular rate and rhythm, normal S1 and S2, no S3 or S4, and no murmur noted.  GI: Normal bowel sounds, soft, non-distended, non-tender  Skin: No bruising or bleeding, normal skin color, texture, turgor, no redness, warmth, or swelling, no rashes, surgical site left hip dressed.   Musculoskeletal: There is no redness, warmth, or swelling of the joints.    Neurologic: Awake, alert, oriented to name, place and time.  Cranial nerves II-XII are grossly intact.    Neuropsychiatric: Calm, normal eye contact, alert, normal affect, oriented to self, place, time and situation, memory for past and recent events intact and thought process normal.    Medications     Continuing ACE inhibitor/ARB/ARNI from home medication list OR ACE inhibitor/ARB order already placed during this visit       - MEDICATION INSTRUCTIONS -         acetaminophen  1,000 mg Oral Q8H     amLODIPine  10 mg Oral Daily     aspirin  81 mg Oral Daily     atorvastatin  40 mg Oral QPM     clopidogrel  75 mg Oral Daily     losartan-hydrochlorothiazide  1 tablet Oral Daily     metoprolol succinate ER  50 mg Oral Daily       Data   Recent Labs   Lab 03/01/19  0701 02/28/19  1528 02/28/19  0523 02/27/19  1832 02/27/19  0533 02/27/19  0215  02/26/19  2140  02/26/19  1436 02/26/19  1302   WBC  --   --  8.3  --  11.1*  --   --   --  18.6* 17.4*   HGB 9.2*  --  8.3* 7.9* 9.4*  --    < >  --  13.0* 13.2*   MCV  --   --  75*  --  72*  --   --   --  73* 74*   PLT  --   --  178  --  231  --   --   --  271 267   INR  --  1.07  --   --   --   --   --   --   --  1.01   NA  --   --  139  --  137  --   --   --   --  137   POTASSIUM  --   --  4.0  --  3.9  --   --   --   --  4.3   CHLORIDE  --   --  108  --  107  --   --   --   --  103   CO2  --   --  29  --  24  --   --   --   --  26   BUN  --   --  9  --  12  --   --   --   --  10   CR  --   --  0.83  --  0.86  --   --   --   --  1.01   ANIONGAP  --   --  2*  --  6  --   --   --   --  8   ROBINSON  --   --  7.9*  --  8.3*  --   --   --   --  9.2   GLC  --   --  105*  --  125*  --   --   --   --  118*   TROPI  --   --   --   --  90.909* 112.935*  --  119.975*  --  0.032    < > = values in this interval not displayed.     Recent Results (from the past 24 hour(s))   US Lower Extremity Venous Duplex Bilateral    Narrative     LOWER EXTREMITY VENOUS DUPLEX BILATERAL 3/1/2019 10:27 AM    CLINICAL HISTORY/INDICATION: Large PFO, recent MI with thromboemboli  in coronary arteries. Rule out DVT    COMPARISON: None relevant    TECHNIQUE:   Grayscale, color-flow, and spectral waveform analysis were performed  of the deep veins of the bilateral lower extremities    FINDINGS:   The right common femoral vein, femoral vein, popliteal vein and tibial  veins demonstrate normal compressibility, spectral waveform, color  flow and augmentation.    The left common femoral vein, femoral vein, popliteal vein and tibial  veins demonstrate normal compressibility, spectral waveform, color  flow and augmentation.      Impression    IMPRESSION:   1. No evidence of deep venous thrombosis in the right lower extremity.  2. No evidence of deep venous thrombosis in the left lower extremity.    AURORA HINDS DO

## 2019-03-01 NOTE — PROGRESS NOTES
[facility]    Hospitalist Progress Note    Date of Service (when I saw the patient): 03/01/2019    Assessment & Plan   Ross Smith is a 48 year old male who was admitted on 2/26/2019 following an an STEMI during a left total hip arthroplasty.    1.  STEMI, suspected thromboembolic.  Cardiology assistance appreciated.  Cardiology attempted a transesophageal echocardiogram the patient was unable to tolerate.  Transthoracic echocardiogram demonstrates no evidence for embolic source, EF 40-45% mid to distal lateral, inferolateral and inferior akinesis.  Cardiac MRI consistent with recent circumflex infarction.  Continue aspirin and Plavix.  Heparin was discontinued 2/27 due to bleeding in hip.  -Vascular medicine is been consulted.  Hypercoagulable labs negative for aCL, B2GP.  Protein C and S normal.  AT3 normal.  INR and chromogenic factor X are both normal.  LAC, Factor 2 and 5 mutations pending.  - Echo positive for large PFO.  BLE dopplers negative for DVTs.  Will consult Cardiology to evaluate for PFO closure device.    2.  Left total hip arthroplasty.  Orthopedic surgery assistance appreciated.  X-ray reveals that arthroplasty appears well seated, the patient is okay for weightbearing as tolerated with walker.  Continue PT and OT.    3.  Hypertension.  Blood pressure is well controlled.  Continue Toprol, amlodipine, and hydrochlorothiazide/losartan.    4.  Pain control.  Pain control is improved today.  Patient has been ambulating in halls.  Continue Tylenol 1 g 3 times daily, Dilaudid 2.5-1 mg every 2 hours as needed, and Toradol and Flexeril as needed for pain.      5.  Acute blood loss anemia.  Transfuse for hemoglobin less than 7.    Disposition: Expected discharge in several days, pending PFO treatment.    DVT Prophylaxis: Pneumatic Compression Devices  Code Status: Full Code    Mg Holley  Text Page (7 am to 6 pm)    Interval History   The patient is resting in bed.  His left hip  pain is improved.  He is ambulating on the unit.    -Data reviewed today: I reviewed all new labs and imaging results over the last 24 hours. I personally reviewed no images or EKG's today.    Physical Exam   Temp: 98.9  F (37.2  C) Temp src: Oral BP: 122/82 Pulse: 107 Heart Rate: 100 Resp: 16 SpO2: 97 % O2 Device: None (Room air) Oxygen Delivery: 2 LPM  Vitals:    02/27/19 0250 02/28/19 0500 03/01/19 0621   Weight: 73.8 kg (162 lb 11.2 oz) 75.2 kg (165 lb 12.8 oz) 75.5 kg (166 lb 7.2 oz)     Vital Signs with Ranges  Temp:  [98.5  F (36.9  C)-100.7  F (38.2  C)] 98.9  F (37.2  C)  Pulse:  [] 107  Heart Rate:  [] 100  Resp:  [16-20] 16  BP: (104-133)/(62-90) 122/82  SpO2:  [96 %-100 %] 97 %  I/O last 3 completed shifts:  In: 900 [P.O.:900]  Out: 1500 [Urine:1500]    Gen: Well nourished, well developed, alert and oriented x 3, no acute distressed  HEENT: Atraumatic, normocephalic  Lungs: Clear to ausculation without wheezes, rhonchi, or rales  Heart: Regular rate and rhythm, no gallops or rubs, no murmurs  GI: Bowel sound normal, no hepatosplenomegaly or masses  Lymph: No lymphadenopathy or edema  Skin: No rashes     Medications     Continuing ACE inhibitor/ARB/ARNI from home medication list OR ACE inhibitor/ARB order already placed during this visit       - MEDICATION INSTRUCTIONS -         acetaminophen  1,000 mg Oral Q8H     amLODIPine  10 mg Oral Daily     aspirin  81 mg Oral Daily     atorvastatin  40 mg Oral QPM     clopidogrel  75 mg Oral Daily     losartan-hydrochlorothiazide  1 tablet Oral Daily     metoprolol succinate ER  50 mg Oral Daily       Data   Recent Labs   Lab 03/01/19  0701 02/28/19  1528 02/28/19  0523 02/27/19  1832 02/27/19  0533 02/27/19  0215  02/26/19  2140 02/26/19  1436 02/26/19  1302   WBC  --   --  8.3  --  11.1*  --   --   --  18.6* 17.4*   HGB 9.2*  --  8.3* 7.9* 9.4*  --    < >  --  13.0* 13.2*   MCV  --   --  75*  --  72*  --   --   --  73* 74*   PLT  --   --  178  --   231  --   --   --  271 267   INR  --  1.07  --   --   --   --   --   --   --  1.01   NA  --   --  139  --  137  --   --   --   --  137   POTASSIUM  --   --  4.0  --  3.9  --   --   --   --  4.3   CHLORIDE  --   --  108  --  107  --   --   --   --  103   CO2  --   --  29  --  24  --   --   --   --  26   BUN  --   --  9  --  12  --   --   --   --  10   CR  --   --  0.83  --  0.86  --   --   --   --  1.01   ANIONGAP  --   --  2*  --  6  --   --   --   --  8   ROBINSON  --   --  7.9*  --  8.3*  --   --   --   --  9.2   GLC  --   --  105*  --  125*  --   --   --   --  118*   TROPI  --   --   --   --  90.909* 112.935*  --  119.975*  --  0.032    < > = values in this interval not displayed.       Recent Results (from the past 24 hour(s))   US Lower Extremity Venous Duplex Bilateral    Narrative    US LOWER EXTREMITY VENOUS DUPLEX BILATERAL 3/1/2019 10:27 AM    CLINICAL HISTORY/INDICATION: Large PFO, recent MI with thromboemboli  in coronary arteries. Rule out DVT    COMPARISON: None relevant    TECHNIQUE:   Grayscale, color-flow, and spectral waveform analysis were performed  of the deep veins of the bilateral lower extremities    FINDINGS:   The right common femoral vein, femoral vein, popliteal vein and tibial  veins demonstrate normal compressibility, spectral waveform, color  flow and augmentation.    The left common femoral vein, femoral vein, popliteal vein and tibial  veins demonstrate normal compressibility, spectral waveform, color  flow and augmentation.      Impression    IMPRESSION:   1. No evidence of deep venous thrombosis in the right lower extremity.  2. No evidence of deep venous thrombosis in the left lower extremity.    AURORA HINDS,

## 2019-03-01 NOTE — PLAN OF CARE
Patients O2 sats drop to 86 on room air only when sleeping. O2 2l applied at HS. Pain fairly well controlled with oxycodone. Encouraged to ask for meds when pain starts. C/o left arm nb which resolved with repositioning. No SOB or chest pain. Will continue to monitor.

## 2019-03-02 LAB — INTERPRETATION ECG - MUSE: NORMAL

## 2019-03-02 NOTE — PROGRESS NOTES
Discharge summary went over with pt. Med and pt's belonging was given to pt as well. Questions encouraged and answered.

## 2019-03-02 NOTE — PLAN OF CARE
Physical Therapy Discharge Summary    Reason for therapy discharge:    Discharged to home with OP CR Phase II.     Progress towards therapy goal(s). See goals on Care Plan in [facility] electronic health record for goal details.  Goals partially met.  Barriers to achieving goals:   discharge from facility.    Therapy recommendation(s):    Continued therapy is recommended.  Rationale/Recommendations:  Pt will benefit from continued skilled PT intervention in order to improve activity tolerance and independence with functional mobility.

## 2019-03-04 LAB
COPATH REPORT: NORMAL
LA PPP-IMP: NEGATIVE

## 2019-03-05 ENCOUNTER — TELEPHONE (OUTPATIENT)
Dept: CARDIOLOGY | Facility: CLINIC | Age: 49
End: 2019-03-05

## 2019-03-05 NOTE — TELEPHONE ENCOUNTER
Patient was evaluated by cardiology while inpatient for STEMI noted during left hip arthroplasty. 2/26/19 POBA with aspiration of thrombus OM2. Echo shows EF of 45%. Cardiac MRI yesterday revealed EF of 51% with poor viability in the circumflex territory infarct. HALIMA shows PFO and referral made to structural heart clinic. Called patient to discuss any post hospital d/c questions, review medication changes, and confirm f/u appts. Patient denied any questions regarding new medications or changes to PTA medications. Patient denied any SOB, chest pain, or light headedness. RFA cardiac cath site is without bleeding, swelling, redness or tenderness.  has been running a fever up to 101 since last night, with intermittent chills and sweats. hospitals has been trying to contact his [facility] surgeon without success. Instructed pt to call [facility] and ask to speak to Triage RN today, or go to [facility]  for evaluation. Pt verbalized understanding. RN confirmed with patient that he has an apt scheduled on 3/19/19 with DESIREE Kathrine España. Cardiac rehab scheduled on 3/5/19 and Dr. Henry on 4/16/19. Patient advised to call clinic with any cardiac related questions or concerns prior to this desiree't. Patient verbalized understanding and agreed with plan.  HELENA Farrar RN.

## 2019-03-13 ENCOUNTER — OFFICE VISIT (OUTPATIENT)
Dept: OTHER | Facility: CLINIC | Age: 49
End: 2019-03-13
Attending: INTERNAL MEDICINE
Payer: COMMERCIAL

## 2019-03-13 VITALS
OXYGEN SATURATION: 97 % | WEIGHT: 159 LBS | DIASTOLIC BLOOD PRESSURE: 72 MMHG | HEIGHT: 67 IN | HEART RATE: 96 BPM | BODY MASS INDEX: 24.96 KG/M2 | RESPIRATION RATE: 16 BRPM | SYSTOLIC BLOOD PRESSURE: 114 MMHG

## 2019-03-13 DIAGNOSIS — I21.3 ST ELEVATION MYOCARDIAL INFARCTION (STEMI), UNSPECIFIED ARTERY (H): Primary | ICD-10-CM

## 2019-03-13 PROCEDURE — G0463 HOSPITAL OUTPT CLINIC VISIT: HCPCS

## 2019-03-13 PROCEDURE — 99214 OFFICE O/P EST MOD 30 MIN: CPT | Mod: ZP | Performed by: INTERNAL MEDICINE

## 2019-03-13 SDOH — HEALTH STABILITY: MENTAL HEALTH: HOW OFTEN DO YOU HAVE A DRINK CONTAINING ALCOHOL?: NEVER

## 2019-03-13 ASSESSMENT — MIFFLIN-ST. JEOR: SCORE: 1549.85

## 2019-03-13 NOTE — PROGRESS NOTES
Ross Smith is a 48 year old male who is presenting at the current time to discuss his diagnosi(es) of     ST elevation myocardial infarction (STEMI), unspecified artery (H) .      Review Of Systems  Skin: negative  Eyes: negative  Ears/Nose/Throat: negative  Respiratory: No shortness of breath, dyspnea on exertion, cough, or hemoptysis  Cardiovascular: negative  Gastrointestinal: negative  Genitourinary: negative  Musculoskeletal: positive for postoperative discomfort (still has staples in place)  Neurologic: negative  Psychiatric: negative  Hematologic/Lymphatic/Immunologic: negative  Endocrine: negative    PAST MEDICAL HISTORY:                  Past Medical History:   Diagnosis Date     Arthritis      ETOH abuse      Hypertension        PAST SURGICAL HISTORY:                  Past Surgical History:   Procedure Laterality Date     CV HEART CATHETERIZATION WITH POSSIBLE INTERVENTION N/A 2/26/2019    Procedure: Heart Catheterization;  Surgeon: Nu Henry MD;  Location: [facility] CARDIAC C[facility] LAB       CURRENT MEDICATIONS:                  Current Outpatient Medications   Medication Sig Dispense Refill     amLODIPine (NORVASC) 10 MG tablet Take 10 mg by mouth daily       aspirin (ASA) 81 MG EC tablet Take 1 tablet (81 mg) by mouth daily 30 tablet 0     atorvastatin (LIPITOR) 40 MG tablet Take 1 tablet (40 mg) by mouth every evening 30 tablet 0     clopidogrel (PLAVIX) 75 MG tablet Take 1 tablet (75 mg) by mouth daily 30 tablet 0     hydrOXYzine (ATARAX) 10 MG tablet Take 1 tablet (10 mg) by mouth 3 times daily as needed for anxiety or other (pain) 30 tablet 0     losartan-hydrochlorothiazide (HYZAAR) 100-25 MG tablet Take 1 tablet by mouth daily       methocarbamol (ROBAXIN) 500 MG tablet Take 1 tablet (500 mg) by mouth 4 times daily as needed for muscle spasms 30 tablet 0     metoprolol succinate ER (TOPROL-XL) 50 MG 24 hr tablet Take 1 tablet (50 mg) by mouth daily 30 tablet 0     nitroGLYcerin  (NITROSTAT) 0.4 MG sublingual tablet For chest pain place 1 tablet under the tongue every 5 minutes for 3 doses. If symptoms persist 5 minutes after 1st dose call 911. 20 tablet 0     order for DME Equipment being ordered: Walker Wheels () and Walker ()  Treatment Diagnosis: Impaired gait 1 each 0     oxyCODONE (ROXICODONE) 5 MG tablet Take 1-2 tablets (5-10 mg) by mouth every 4 hours as needed for moderate to severe pain 30 tablet 0       ALLERGIES:                No Known Allergies    SOCIAL HISTORY:                  Social History     Socioeconomic History     Marital status:      Spouse name: Not on file     Number of children: Not on file     Years of education: Not on file     Highest education level: Not on file   Occupational History     Not on file   Social Needs     Financial resource strain: Not on file     Food insecurity:     Worry: Not on file     Inability: Not on file     Transportation needs:     Medical: Not on file     Non-medical: Not on file   Tobacco Use     Smoking status: Never Smoker     Smokeless tobacco: Never Used   Substance and Sexual Activity     Alcohol use: No     Frequency: Never     Drug use: No     Sexual activity: No   Lifestyle     Physical activity:     Days per week: Not on file     Minutes per session: Not on file     Stress: Not on file   Relationships     Social connections:     Talks on phone: Not on file     Gets together: Not on file     Attends Christianity service: Not on file     Active member of club or organization: Not on file     Attends meetings of clubs or organizations: Not on file     Relationship status: Not on file     Intimate partner violence:     Fear of current or ex partner: Not on file     Emotionally abused: Not on file     Physically abused: Not on file     Forced sexual activity: Not on file   Other Topics Concern     Not on file   Social History Narrative     Not on file       FAMILY HISTORY:                 No family history on  file.      Physical exam Reveals:    O/P: WNL  HEENT: WNL  NECK: No JVD, thyromegaly, or lymphadenopathy  HEART: RRR, no murmurs, gallops, or rubs  LUNGS: CTA bilaterally without rales, wheezes, or rhonchi  GI: NABS, nondistended, nontender, soft  EXT:without cyanosis, clubbing, or edema  NEURO: nonfocal  : no flank tenderness      A/P:    (I21.3) Intraoperative (Lt CASSANDRA) STEMI with normal coronary arteries on coronary angiography 2/2019, with large PFO seen on HALIMA, andnormal thrombophilia evaluation  (primary encounter diagnosis)  Comment: his coronary angiography showed normal coronary arteries. He had a TTE without bubble study which was not concerning. I therefore ordered a bubble study which revealed a large PFO. While on AC while hospitalized, he dropped his hgb 4 units without a clinically significant bleed. He was therefore treated with DAP. A thrombophilia evaluation was ordered, and has returned normal for Leyden, prothrombin mutations, APLAs, protein C, S, ATIII deficiency.   Plan: Remain on DAP. Do not smoke anything. See Dr. Henry or Archie in structural heart clinic to ascertain if PFO closure should be considered.

## 2019-03-13 NOTE — PROGRESS NOTES
"Ross Smith is a 48 year old male who presents for:  Chief Complaint   Patient presents with     RECHECK     2 week follow up appointment        Vitals:    Vitals:    03/13/19 1409 03/13/19 1411   BP: 104/68 114/72   BP Location: Right arm Left arm   Patient Position: Chair Chair   Cuff Size: Adult Regular Adult Regular   Pulse: 96    Resp: 16    SpO2: 97%    Weight: 159 lb (72.1 kg)    Height: 5' 7\" (1.702 m)        BMI:  Estimated body mass index is 24.9 kg/m  as calculated from the following:    Height as of this encounter: 5' 7\" (1.702 m).    Weight as of this encounter: 159 lb (72.1 kg).    Pain Score:  Data Unavailable        Ru Loera    "

## 2019-03-19 ENCOUNTER — OFFICE VISIT (OUTPATIENT)
Dept: CARDIOLOGY | Facility: CLINIC | Age: 49
End: 2019-03-19
Payer: COMMERCIAL

## 2019-03-19 VITALS
DIASTOLIC BLOOD PRESSURE: 60 MMHG | HEART RATE: 81 BPM | WEIGHT: 159 LBS | HEIGHT: 67 IN | BODY MASS INDEX: 24.96 KG/M2 | OXYGEN SATURATION: 97 % | SYSTOLIC BLOOD PRESSURE: 98 MMHG

## 2019-03-19 DIAGNOSIS — E78.5 HYPERLIPIDEMIA, UNSPECIFIED HYPERLIPIDEMIA TYPE: ICD-10-CM

## 2019-03-19 DIAGNOSIS — I21.3 ST ELEVATION MYOCARDIAL INFARCTION (STEMI), UNSPECIFIED ARTERY (H): Primary | ICD-10-CM

## 2019-03-19 DIAGNOSIS — Q21.12 PFO (PATENT FORAMEN OVALE): ICD-10-CM

## 2019-03-19 DIAGNOSIS — I10 BENIGN ESSENTIAL HYPERTENSION: ICD-10-CM

## 2019-03-19 DIAGNOSIS — I25.5 ISCHEMIC CARDIOMYOPATHY: ICD-10-CM

## 2019-03-19 PROCEDURE — 99214 OFFICE O/P EST MOD 30 MIN: CPT | Performed by: NURSE PRACTITIONER

## 2019-03-19 RX ORDER — METOPROLOL SUCCINATE 50 MG/1
50 TABLET, EXTENDED RELEASE ORAL DAILY
Qty: 30 TABLET | Refills: 10 | Status: SHIPPED | OUTPATIENT
Start: 2019-03-19 | End: 2020-01-16

## 2019-03-19 RX ORDER — CLOPIDOGREL BISULFATE 75 MG/1
75 TABLET ORAL DAILY
Qty: 30 TABLET | Refills: 6 | Status: SHIPPED | OUTPATIENT
Start: 2019-03-19 | End: 2020-06-04

## 2019-03-19 RX ORDER — ATORVASTATIN CALCIUM 40 MG/1
40 TABLET, FILM COATED ORAL EVERY EVENING
Qty: 30 TABLET | Refills: 6 | Status: SHIPPED | OUTPATIENT
Start: 2019-03-19 | End: 2020-01-16

## 2019-03-19 RX ORDER — LOSARTAN POTASSIUM AND HYDROCHLOROTHIAZIDE 25; 100 MG/1; MG/1
1 TABLET ORAL DAILY
Qty: 30 TABLET | Refills: 11 | Status: SHIPPED | OUTPATIENT
Start: 2019-03-19 | End: 2020-01-16 | Stop reason: DRUGHIGH

## 2019-03-19 ASSESSMENT — MIFFLIN-ST. JEOR: SCORE: 1549.85

## 2019-03-19 NOTE — LETTER
3/19/2019    Physician No Ref-Primary  No address on file    RE: Ross Smith       Dear Colleague,    I had the pleasure of seeing Ross Smith in the [facility].    Cardiology Clinic Progress Note  Ross Smith MRN# [id number]   YOB: 1970 Age: 48 year old     Reason For Visit:  Hospital discharge follow-up   Primary Cardiologist:   Dr. Yoder          History of Presenting Illness:    Ross Smith is a pleasant 48 year old patient who carries a past medical history significant for arthritis, alcohol abuse, and hypertension.     On 2/26/2019, he was in the process of undergoing left hip arthroplasty and was noted to be hypotensive with EKG changes.  Post hip procedure, he was noted to have inferior STEMI pattern on his postprocedure EKG and complained of chest discomfort.  He was transported emergently to [facility] where he went emergently for coronary angiogram, aspiration thrombectomy and angioplasty to the [facility].  It was noted to have the appearance of an embolic phenomenon versus a ruptured plaque.  Troponin peaked at 119.975.  A follow-up echocardiogram demonstrated an ejection fraction of 40-45%, mid to distal lateral, inferior lateral and inferior akinesis, grade 1 diastolic dysfunction.  A bubble study confirmed a large amount of immediate crossover of agitated bubbles into the left atrium from the right atrium suggesting significant shunt.  Due to the possibility of a cardiac source of emboli, he was recommended for a HALIMA to rule out left atrial appendage clot.  Unfortunately, he was unable to tolerate the procedure with moderate sedation, and insisted on stopping the procedure.  A follow-up cardiac MRI demonstrated the LV to be 51%, with moderate to severe hypokinesis of the mid inferior, inferior lateral, and inferior lateral wall as well as the apical lateral wall. No cardiac thrombus was detected. He was not initiated on anticoagulation due to a 4 gm  drop in Hgb during his admission. He remains on Plavix and aspirin.  He did undergo an evaluation for hypercoagulability which was unrevealing.  Anti-cardiolipin antibodies and beta-2 glycoprotein antibodies were negative.  INR, chromogenic factor X, anti-thrombin, protein C and protein S, and lupus anticoagulant levels were normal.  Factor V and factor II mutation were both negative.  He was advised to follow up with the structural heart clinic for evaluation and consideration of possible PFO closure. He comes to the office today accompanied by his wife for follow-up.    He is feeling well on a cardiac standpoint, denies chest pain, shortness of breath, PND, orthopnea, presyncope, syncope, edema, heart racing, or palpitations.  He has been participating in physical therapy post hip surgery and has now been released from orthopedics. 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  He is scheduled to enroll in cardiac rehab on 3/21/2019.     Blood pressure today is borderline low at 98/60, asymptomatic.  He is managed on amlodipine, Hyzaar, and metoprolol.  Last BMP showed a sodium of 139, potassium 4.0, BUN 9, creatinine 0.83, GFR greater than 90  BMI 24.9  Follow-up hemoglobin on 3/1/2019 was 9.2    He admits to no alcohol or cigarettes since discharge.  Follows a heart healthy diet  Remains compliant with all medications.  Denies any evidence of bleeding on dual antiplatelet therapy.                   Assessment and Plan:     1.  STEMI -status post aspiration thrombectomy and angioplasty to the OM 2.  Possibly embolic phenomenon versus ruptured plaque.  Plavix, aspirin, statin, Hyzaar, metoprolol, and amlodipine.  Cardiac rehab.     2.  PFO -  A bubble study confirmed a large amount of immediate crossover of agitated bubbles into the left atrium from the right atrium suggesting significant shunt.  Initial HALIMA was unsuccessful due to the inability for patient to tolerate  sedation.  He is being recommended to undergo HALIMA with anesthesia, in which he is agreeable.  Risks and benefits were reviewed with patient with verbal understanding.  He will be contacted with date and time of the HALIMA and follow-up with Dr. Almanza to review possible PFO closure.  I have asked him to remain off of work until HALIMA and follow-up is complete.    3.  Ischemic cardiomyopathy -EF 45-50%.  Continue on metoprolol, losartan hydrochlorothiazide,  and amlodipine.   4.  Hypertension -borderline low today, asymptomatic, continue on current medical therapy.   5.  Hyperlipidemia -LDL not at goal 110, continue on atorvastatin.             Thank you for allowing me to participate in this delightful patient's care.  He is scheduled to undergo a HALIMA with follow-up in our structural heart clinic with Dr. Almanza for consideration of possible PFO closure.       XENIA Escalante, CNP          Review of Systems:   Review of Systems:  Skin:  Negative     Eyes:  Negative    ENT:  Negative    Respiratory:  Negative    Cardiovascular:  Negative;palpitations;chest pain;syncope or near-syncope;cyanosis;edema;dizziness;lightheadedness;fatigue    Gastroenterology: Negative    Genitourinary:  Negative    Musculoskeletal:  Negative    Neurologic:  Positive for numbness or tingling of hands  Psychiatric:  Negative    Heme/Lymph/Imm:  Negative    Endocrine:  Negative                Physical Exam:     GEN:  In general, this is a thin male in no acute distress.  HEENT:  Pupils equal, round. Sclerae nonicteric. Clear oropharynx. Mucous membranes moist.  NECK: Supple, no masses appreciated. Trachea midline.  No JVD   C/V:  Regular rate and rhythm, no murmur, rub or gallop. No S3 or RV heave.   RESP: Respirations are unlabored. No use of accessory muscles. Clear to auscultation bilaterally without wheezing, rales, or rhonchi.  GI: Abdomen soft, nontender, nondistended. No HSM appreciated.   EXTREM: No LE edema. No cyanosis or  clubbing.  NEURO: Alert and oriented, cooperative. No obvious focal deficits.   PSYCH: Normal affect.  SKIN: Warm and dry. No rashes or petechiae appreciated.          Past Medical History:     Past Medical History:   Diagnosis Date     Arthritis      ASCVD (arteriosclerotic cardiovascular disease)      ETOH abuse      HLD (hyperlipidemia)      Hypertension      STEMI (ST elevation myocardial infarction) (H)               Past Surgical History:     Past Surgical History:   Procedure Laterality Date     CV HEART CATHETERIZATION WITH POSSIBLE INTERVENTION N/A 2/26/2019    thrombectomy and angioplasty to OM 2              Allergies:   Patient has no known allergies.       Data:   All laboratory data reviewed:    LAST CHOLESTEROL:  Lab Results   Component Value Date    CHOL 178 02/26/2019     Lab Results   Component Value Date    HDL 58 02/26/2019     Lab Results   Component Value Date     02/26/2019     Lab Results   Component Value Date    TRIG 52 02/26/2019     No results found for: CHOLHDLRATIO    LAST BMP:  Lab Results   Component Value Date     02/28/2019      Lab Results   Component Value Date    POTASSIUM 4.0 02/28/2019     Lab Results   Component Value Date    CHLORIDE 108 02/28/2019     Lab Results   Component Value Date    ROBINSON 7.9 02/28/2019     Lab Results   Component Value Date    CO2 29 02/28/2019     Lab Results   Component Value Date    BUN 9 02/28/2019     Lab Results   Component Value Date    CR 0.83 02/28/2019     Lab Results   Component Value Date     02/28/2019       LAST CBC:  Lab Results   Component Value Date    WBC 8.3 02/28/2019     Lab Results   Component Value Date    RBC 3.42 02/28/2019     Lab Results   Component Value Date    HGB 9.2 03/01/2019     Lab Results   Component Value Date    HCT 25.7 02/28/2019     Lab Results   Component Value Date    MCV 75 02/28/2019     Lab Results   Component Value Date    MCH 24.3 02/28/2019     Lab Results   Component Value Date     [facility] 32.3 02/28/2019     Lab Results   Component Value Date    RDW 15.6 02/28/2019     Lab Results   Component Value Date     02/28/2019             Thank you for allowing me to participate in the care of your patient.      Sincerely,     XENIA Escalante CNP     [facility]    cc:   XENIA De Santiago CNP  [address]  [place] [address]

## 2019-03-19 NOTE — LETTER
March 19, 2019    RE:  Ross Smith 1970  [address]  [facility] [address]          To whom it may concern:    This letter is to certify that Ross Smith will need to be off work until April 1, 2019.       Should you have any questions, please call [phone].    Sincerely,        [facility]

## 2019-03-19 NOTE — PATIENT INSTRUCTIONS
Thanks for coming into [facility] today.    Doing well on a cardiac standpoint  Reviewed results of angiogram and echocardiogram  Continue on current medical therapy  HALIMA and follow up with Dr. Almanza  Hold off from returning to work until decision has been made regarding PFO  Recommend cardiac rehab       Please call my nurse at [phone] with any questions or concerns.    Scheduling phone number: [phone]  Reminder: Please bring in all current medications, over the counter supplements and vitamin bottles to your next appointment.

## 2019-03-20 DIAGNOSIS — Q21.12 PFO (PATENT FORAMEN OVALE): Primary | ICD-10-CM

## 2019-03-20 PROBLEM — I10 BENIGN ESSENTIAL HYPERTENSION: Status: ACTIVE | Noted: 2019-03-20

## 2019-03-20 NOTE — PROGRESS NOTES
Dr. Yoder would like this patient seen by Dr. Almanza to discuss PFO closure.  I have reviewed with Dr. Almanza.  He would like patient to have HALIMA done prior to office visit.  2/26/19 HALIMA was attempted but aborted due to patient intolerance of procedure from extreme hip pain.  Will reach out to patient since he now has had his hip fixed, he may tolerate HALIMA.  If not will plan HLAIMA to be done with anesthesia.

## 2019-03-20 NOTE — PROGRESS NOTES
Cardiology Clinic Progress Note  Ross Smith MRN# [id number]   YOB: 1970 Age: 48 year old     Reason For Visit:  Hospital discharge follow-up   Primary Cardiologist:   Dr. Yoder          History of Presenting Illness:    Ross Smith is a pleasant 48 year old patient who carries a past medical history significant for arthritis, alcohol abuse, and hypertension.     On 2/26/2019, he was in the process of undergoing left hip arthroplasty and was noted to be hypotensive with EKG changes.  Post hip procedure, he was noted to have inferior STEMI pattern on his postprocedure EKG and complained of chest discomfort.  He was transported emergently to [facility] where he went emergently for coronary angiogram, aspiration thrombectomy and angioplasty to the [facility].  It was noted to have the appearance of an embolic phenomenon versus a ruptured plaque.  Troponin peaked at 119.975.  A follow-up echocardiogram demonstrated an ejection fraction of 40-45%, mid to distal lateral, inferior lateral and inferior akinesis, grade 1 diastolic dysfunction.  A bubble study confirmed a large amount of immediate crossover of agitated bubbles into the left atrium from the right atrium suggesting significant shunt.  Due to the possibility of a cardiac source of emboli, he was recommended for a HALIMA to rule out left atrial appendage clot.  Unfortunately, he was unable to tolerate the procedure with moderate sedation, and insisted on stopping the procedure.  A follow-up cardiac MRI demonstrated the LV to be 51%, with moderate to severe hypokinesis of the mid inferior, inferior lateral, and inferior lateral wall as well as the apical lateral wall. No cardiac thrombus was detected. He was not initiated on anticoagulation due to a 4 gm drop in Hgb during his admission. He remains on Plavix and aspirin.  He did undergo an evaluation for hypercoagulability which was unrevealing.  Anti-cardiolipin antibodies and beta-2 glycoprotein  antibodies were negative.  INR, chromogenic factor X, anti-thrombin, protein C and protein S, and lupus anticoagulant levels were normal.  Factor V and factor II mutation were both negative.  He was advised to follow up with the structural heart clinic for evaluation and consideration of possible PFO closure. He comes to the office today accompanied by his wife for follow-up.    He is feeling well on a cardiac standpoint, denies chest pain, shortness of breath, PND, orthopnea, presyncope, syncope, edema, heart racing, or palpitations.  He has been participating in physical therapy post hip surgery and has now been released from orthopedics. 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  He is scheduled to enroll in cardiac rehab on 3/21/2019.     Blood pressure today is borderline low at 98/60, asymptomatic.  He is managed on amlodipine, Hyzaar, and metoprolol.  Last BMP showed a sodium of 139, potassium 4.0, BUN 9, creatinine 0.83, GFR greater than 90  BMI 24.9  Follow-up hemoglobin on 3/1/2019 was 9.2    He admits to no alcohol or cigarettes since discharge.  Follows a heart healthy diet  Remains compliant with all medications.  Denies any evidence of bleeding on dual antiplatelet therapy.                   Assessment and Plan:     1.  STEMI -status post aspiration thrombectomy and angioplasty to the OM 2.  Possibly embolic phenomenon versus ruptured plaque.  Plavix, aspirin, statin, Hyzaar, metoprolol, and amlodipine.  Cardiac rehab.     2.  PFO -  A bubble study confirmed a large amount of immediate crossover of agitated bubbles into the left atrium from the right atrium suggesting significant shunt.  Initial HALIMA was unsuccessful due to the inability for patient to tolerate sedation.  He is being recommended to undergo HALIMA with anesthesia, in which he is agreeable.  Risks and benefits were reviewed with patient with verbal understanding.  He will be contacted with date and  time of the HALIMA and follow-up with Dr. Almanza to review possible PFO closure.  I have asked him to remain off of work until HALIMA and follow-up is complete.    3.  Ischemic cardiomyopathy -EF 45-50%.  Continue on metoprolol, losartan hydrochlorothiazide,  and amlodipine.   4.  Hypertension -borderline low today, asymptomatic, continue on current medical therapy.   5.  Hyperlipidemia -LDL not at goal 110, continue on atorvastatin.             Thank you for allowing me to participate in this delightful patient's care.  He is scheduled to undergo a HALIMA with follow-up in our structural heart clinic with Dr. Almanza for consideration of possible PFO closure.       Kathrine Bo, APRN, CNP          Review of Systems:   Review of Systems:  Skin:  Negative     Eyes:  Negative    ENT:  Negative    Respiratory:  Negative    Cardiovascular:  Negative;palpitations;chest pain;syncope or near-syncope;cyanosis;edema;dizziness;lightheadedness;fatigue    Gastroenterology: Negative    Genitourinary:  Negative    Musculoskeletal:  Negative    Neurologic:  Positive for numbness or tingling of hands  Psychiatric:  Negative    Heme/Lymph/Imm:  Negative    Endocrine:  Negative                Physical Exam:     GEN:  In general, this is a thin male in no acute distress.  HEENT:  Pupils equal, round. Sclerae nonicteric. Clear oropharynx. Mucous membranes moist.  NECK: Supple, no masses appreciated. Trachea midline.  No JVD   C/V:  Regular rate and rhythm, no murmur, rub or gallop. No S3 or RV heave.   RESP: Respirations are unlabored. No use of accessory muscles. Clear to auscultation bilaterally without wheezing, rales, or rhonchi.  GI: Abdomen soft, nontender, nondistended. No HSM appreciated.   EXTREM: No LE edema. No cyanosis or clubbing.  NEURO: Alert and oriented, cooperative. No obvious focal deficits.   PSYCH: Normal affect.  SKIN: Warm and dry. No rashes or petechiae appreciated.          Past Medical History:     Past Medical  History:   Diagnosis Date     Arthritis      ASCVD (arteriosclerotic cardiovascular disease)      ETOH abuse      HLD (hyperlipidemia)      Hypertension      STEMI (ST elevation myocardial infarction) (H)               Past Surgical History:     Past Surgical History:   Procedure Laterality Date     CV HEART CATHETERIZATION WITH POSSIBLE INTERVENTION N/A 2/26/2019    thrombectomy and angioplasty to OM 2              Allergies:   Patient has no known allergies.       Data:   All laboratory data reviewed:    LAST CHOLESTEROL:  Lab Results   Component Value Date    CHOL 178 02/26/2019     Lab Results   Component Value Date    HDL 58 02/26/2019     Lab Results   Component Value Date     02/26/2019     Lab Results   Component Value Date    TRIG 52 02/26/2019     No results found for: CHOLHDLRATIO    LAST BMP:  Lab Results   Component Value Date     02/28/2019      Lab Results   Component Value Date    POTASSIUM 4.0 02/28/2019     Lab Results   Component Value Date    CHLORIDE 108 02/28/2019     Lab Results   Component Value Date    ROBINSON 7.9 02/28/2019     Lab Results   Component Value Date    CO2 29 02/28/2019     Lab Results   Component Value Date    BUN 9 02/28/2019     Lab Results   Component Value Date    CR 0.83 02/28/2019     Lab Results   Component Value Date     02/28/2019       LAST CBC:  Lab Results   Component Value Date    WBC 8.3 02/28/2019     Lab Results   Component Value Date    RBC 3.42 02/28/2019     Lab Results   Component Value Date    HGB 9.2 03/01/2019     Lab Results   Component Value Date    HCT 25.7 02/28/2019     Lab Results   Component Value Date    MCV 75 02/28/2019     Lab Results   Component Value Date    MCH 24.3 02/28/2019     Lab Results   Component Value Date    MCHC 32.3 02/28/2019     Lab Results   Component Value Date    RDW 15.6 02/28/2019     Lab Results   Component Value Date     02/28/2019

## 2019-03-25 DIAGNOSIS — I21.3 ST ELEVATION MYOCARDIAL INFARCTION (STEMI), UNSPECIFIED ARTERY (H): ICD-10-CM

## 2019-03-27 ENCOUNTER — ANESTHESIA (OUTPATIENT)
Dept: SURGERY | Facility: CLINIC | Age: 49
End: 2019-03-27
Payer: COMMERCIAL

## 2019-03-27 ENCOUNTER — HOSPITAL ENCOUNTER (OUTPATIENT)
Facility: CLINIC | Age: 49
Discharge: HOME | End: 2019-03-27
Attending: RADIOLOGY | Admitting: RADIOLOGY
Payer: COMMERCIAL

## 2019-03-27 ENCOUNTER — ANESTHESIA EVENT (OUTPATIENT)
Dept: SURGERY | Facility: CLINIC | Age: 49
End: 2019-03-27
Payer: COMMERCIAL

## 2019-03-27 ENCOUNTER — HOSPITAL ENCOUNTER (OUTPATIENT)
Dept: CARDIOLOGY | Facility: CLINIC | Age: 49
End: 2019-03-27
Attending: INTERNAL MEDICINE | Admitting: RADIOLOGY
Payer: COMMERCIAL

## 2019-03-27 VITALS
HEART RATE: 71 BPM | TEMPERATURE: 98.8 F | WEIGHT: 159 LBS | DIASTOLIC BLOOD PRESSURE: 90 MMHG | HEIGHT: 67 IN | OXYGEN SATURATION: 98 % | RESPIRATION RATE: 14 BRPM | SYSTOLIC BLOOD PRESSURE: 123 MMHG | BODY MASS INDEX: 24.96 KG/M2

## 2019-03-27 DIAGNOSIS — Q21.12 PFO (PATENT FORAMEN OVALE): ICD-10-CM

## 2019-03-27 PROCEDURE — 37000009 ZZH ANESTHESIA TECHNICAL FEE, EACH ADDTL 15 MIN

## 2019-03-27 PROCEDURE — 37000008 ZZH ANESTHESIA TECHNICAL FEE, 1ST 30 MIN

## 2019-03-27 PROCEDURE — 76376 3D RENDER W/INTRP POSTPROCES: CPT

## 2019-03-27 PROCEDURE — 76376 3D RENDER W/INTRP POSTPROCES: CPT | Mod: 26 | Performed by: INTERNAL MEDICINE

## 2019-03-27 PROCEDURE — 93320 DOPPLER ECHO COMPLETE: CPT | Mod: 26 | Performed by: INTERNAL MEDICINE

## 2019-03-27 PROCEDURE — 71000012 ZZH RECOVERY PHASE 1 LEVEL 1 FIRST HR

## 2019-03-27 PROCEDURE — 25800030 ZZH RX IP 258 OP 636: Performed by: NURSE ANESTHETIST, CERTIFIED REGISTERED

## 2019-03-27 PROCEDURE — 25000125 ZZHC RX 250: Performed by: NURSE ANESTHETIST, CERTIFIED REGISTERED

## 2019-03-27 PROCEDURE — 93325 DOPPLER ECHO COLOR FLOW MAPG: CPT | Mod: 26 | Performed by: INTERNAL MEDICINE

## 2019-03-27 PROCEDURE — 25000128 H RX IP 250 OP 636: Performed by: NURSE ANESTHETIST, CERTIFIED REGISTERED

## 2019-03-27 PROCEDURE — 40000857 ZZH STATISTIC TEE INCLUDES SEDATION

## 2019-03-27 PROCEDURE — 93312 ECHO TRANSESOPHAGEAL: CPT | Mod: 26 | Performed by: INTERNAL MEDICINE

## 2019-03-27 RX ORDER — PROPOFOL 10 MG/ML
INJECTION, EMULSION INTRAVENOUS PRN
Status: DISCONTINUED | OUTPATIENT
Start: 2019-03-27 | End: 2019-03-27

## 2019-03-27 RX ORDER — PROPOFOL 10 MG/ML
INJECTION, EMULSION INTRAVENOUS CONTINUOUS PRN
Status: DISCONTINUED | OUTPATIENT
Start: 2019-03-27 | End: 2019-03-27

## 2019-03-27 RX ORDER — SODIUM CHLORIDE, SODIUM LACTATE, POTASSIUM CHLORIDE, CALCIUM CHLORIDE 600; 310; 30; 20 MG/100ML; MG/100ML; MG/100ML; MG/100ML
INJECTION, SOLUTION INTRAVENOUS CONTINUOUS PRN
Status: DISCONTINUED | OUTPATIENT
Start: 2019-03-27 | End: 2019-03-27

## 2019-03-27 RX ORDER — LIDOCAINE HYDROCHLORIDE 20 MG/ML
INJECTION, SOLUTION INFILTRATION; PERINEURAL PRN
Status: DISCONTINUED | OUTPATIENT
Start: 2019-03-27 | End: 2019-03-27

## 2019-03-27 RX ORDER — FENTANYL CITRATE 50 UG/ML
INJECTION, SOLUTION INTRAMUSCULAR; INTRAVENOUS PRN
Status: DISCONTINUED | OUTPATIENT
Start: 2019-03-27 | End: 2019-03-27

## 2019-03-27 RX ORDER — DIAZEPAM 5 MG
5 TABLET ORAL
Status: DISCONTINUED | OUTPATIENT
Start: 2019-03-27 | End: 2019-03-27 | Stop reason: HOSPADM

## 2019-03-27 RX ADMIN — SODIUM CHLORIDE, POTASSIUM CHLORIDE, SODIUM LACTATE AND CALCIUM CHLORIDE: 600; 310; 30; 20 INJECTION, SOLUTION INTRAVENOUS at 10:14

## 2019-03-27 RX ADMIN — LIDOCAINE HYDROCHLORIDE 40 MG: 20 INJECTION, SOLUTION INFILTRATION; PERINEURAL at 10:36

## 2019-03-27 RX ADMIN — PROPOFOL 40 MG: 10 INJECTION, EMULSION INTRAVENOUS at 10:36

## 2019-03-27 RX ADMIN — MIDAZOLAM 2 MG: 1 INJECTION INTRAMUSCULAR; INTRAVENOUS at 10:32

## 2019-03-27 RX ADMIN — FENTANYL CITRATE 50 MCG: 50 INJECTION, SOLUTION INTRAMUSCULAR; INTRAVENOUS at 10:36

## 2019-03-27 RX ADMIN — LIDOCAINE HYDROCHLORIDE 60 MG: 20 INJECTION, SOLUTION INFILTRATION; PERINEURAL at 10:32

## 2019-03-27 RX ADMIN — PROPOFOL 150 MCG/KG/MIN: 10 INJECTION, EMULSION INTRAVENOUS at 10:32

## 2019-03-27 RX ADMIN — FENTANYL CITRATE 50 MCG: 50 INJECTION, SOLUTION INTRAMUSCULAR; INTRAVENOUS at 10:30

## 2019-03-27 ASSESSMENT — MIFFLIN-ST. JEOR: SCORE: 1549.95

## 2019-03-27 NOTE — PROGRESS NOTES
The risks and benefits of HALIMA have been discussed with the patient and/or relatives/ family in detail including the risks of serious complications including rare risk of death, esophageal tear or trauma, emergent surgery, pharyngeal hematoma/ trauma, methemoglobinemia, gastrointestinal bleeding etc. Patient denies any active upper GI issues or bleeding or difficult swallowing. Denies prior sedation related problems. The patient understands the above mentioned risks and is willing to proceed with the HALIMA.    Anesthesia team will provide sedation as appropriate and manage airway.

## 2019-03-27 NOTE — PROGRESS NOTES
Procedure completed.    VSS.  Pt alert and orientated.  Tolerating po.  Pt up to wheelchair.  Report given to care suites.

## 2019-03-27 NOTE — PROGRESS NOTES
Pt here for HALIMA with anesthesia.  Assessment complete.  Denies any diff swallowing or choking on food, denies any esophageal disorder.  No false or removable teeth.  discharge instructions reviewed with pt and wife, states understanding.

## 2019-03-27 NOTE — ANESTHESIA CARE TRANSFER NOTE
Patient: Ross Smith    Procedure(s):  TRANSESOPHAGEAL ECHOCARDIOGRAM INTRAOPERATIVE (GENERAL ANESTHESIA)    Diagnosis: PFO  Diagnosis Additional Information: No value filed.    Anesthesia Type:   MAC     Note:  Airway :Face Mask  Patient transferred to:PACU  Comments: At end of procedure, spontaneous respirations, patient alert to voice, able to follow commands. Oxygen via facemask at 8 liters per minute to PACU. Oxygen tubing connected to wall O2 in PACU, SpO2, NiBP, and EKG monitors and alarms on and functioning, Micaela Hugger warmer connected to patient gown, report on patient's clinical status given to PACU RN, RN questions answered.Handoff Report: Identifed the Patient, Identified the Reponsible Provider, Reviewed the pertinent medical history, Discussed the surgical course, Reviewed Intra-OP anesthesia mangement and issues during anesthesia, Set expectations for post-procedure period and Allowed opportunity for questions and acknowledgement of understanding      Vitals: (Last set prior to Anesthesia Care Transfer)    CRNA VITALS  3/27/2019 1026 - 3/27/2019 1056      3/27/2019             NIBP:  83/69  (Abnormal)     Pulse:  75    NIBP Mean:  77    SpO2:  95 %    Resp Rate (set):  10                Electronically Signed By: XENIA Hickman CRNA  March 27, 2019  10:56 AM

## 2019-03-27 NOTE — PROGRESS NOTES
Pt arrived from PACU via wheelchair with wife present. Pt able to ambulate post sedation/anesthesia with stand by assist and tolerated well. All discharge instructions reviewed with pt and wife prior to discharge to home. All questions answered and pt appears to accept and understand.

## 2019-03-27 NOTE — ANESTHESIA POSTPROCEDURE EVALUATION
Patient: Ross Smith    Procedure(s):  TRANSESOPHAGEAL ECHOCARDIOGRAM INTRAOPERATIVE (GENERAL ANESTHESIA)    Diagnosis:PFO  Diagnosis Additional Information: No value filed.    Anesthesia Type:  MAC    Note:  Anesthesia Post Evaluation    Patient location during evaluation: PACU  Patient participation: Able to fully participate in evaluation  Level of consciousness: awake and alert  Pain management: adequate  Airway patency: patent  Cardiovascular status: acceptable and hemodynamically stable  Respiratory status: acceptable and unassisted  Hydration status: acceptable  PONV: none             Last vitals:  Vitals:    03/27/19 1122 03/27/19 1200 03/27/19 1215   BP: 108/76  123/90   Pulse: 71     Resp: 14     Temp:      SpO2: 97% 99% 98%         Electronically Signed By: Tevin Miller DO  March 27, 2019  3:21 PM

## 2019-03-27 NOTE — DISCHARGE INSTRUCTIONS
HALIMA  (Transesophageal Echocardiogram)  Discharge Instructions    After you go home:      Have an adult stay with you for 6 hours.       For 24 hours - due to the sedation you received:    Relax and take it easy.    Do NOT make any important or legal decisions.    Do NOT drive or operate machines at home or at work.    Do NOT drink alcohol.    Diet:    You may resume your normal diet, but no scratchy foods for two days.    If your throat is sore, eat cold, bland or soft foods.    You may have heartburn if the tube used in the exam entered your stomach.  If so:   - Do not eat acidic and spicy foods.   - Do not eat three hours before bedtime. Clear liquids are okay.   - When lying down, use two pillows to raise your head.    Medicines:      Take your medications, including blood thinners, unless your provider tells you not to.    If you have stopped any medicines, check with your provider about when to restart them.    You may take Tylenol (Acetaminophen) if your throat is sore.    You may take antacids if you have heartburn.      Follow Up Appointments:      Follow up with your cardiologist at [facility] of patient preference as instructed.    Follow up with your primary care provider as needed.    Call the clinic if:      You have heartburn that is severe or lasts more than 72 hours.    You have a sore throat that feels worse after 72 hours.    You have shortness of breath, neck pain, chest pain, fever, chills, coughing up blood, or other unusual signs.    Questions or concerns      [facility] [place]:    [phone] [facility] (7 days a week)

## 2019-03-27 NOTE — PROGRESS NOTES
Care Suites Discharge Summary    Discharge Criteria:   Discharge Criteria met per MD orders: Yes.   Vital signs stable.     Pt demonstrates ability to ambulate safely: Yes.  (See discharge questionnaire for additional information)    Discharge instructions & education:   Discharge instructions reviewed with patient and spouse. Patient verbalizes  understanding.   Additional patient education provided:  none.     Medications:   Patient will be discharging on new medications- No. Patient verbalizes reason for use, start date, and side effects NA.    Items returned to patient:   Home and hospital acquired medications returned to patient NA   Listed belongings gathered and returned to patient: Yes    Patient discharged to home with family driving.    Anne Johnson

## 2019-03-27 NOTE — ANESTHESIA PREPROCEDURE EVALUATION
Anesthesia Pre-Procedure Evaluation    Patient: Ross Smith   MRN: [id number] : 1970          Preoperative Diagnosis: PFO    Procedure(s):  TRANSESOPHAGEAL ECHOCARDIOGRAM INTRAOPERATIVE (GENERAL ANESTHESIA)    Past Medical History:   Diagnosis Date     Arthritis      ASCVD (arteriosclerotic cardiovascular disease)      ETOH abuse      HLD (hyperlipidemia)      Hypertension      STEMI (ST elevation myocardial infarction) (H)      Past Surgical History:   Procedure Laterality Date     CV HEART CATHETERIZATION WITH POSSIBLE INTERVENTION N/A 2019    thrombectomy and angioplasty to  2       Anesthesia Evaluation     .             ROS/MED HX    ENT/Pulmonary:       Neurologic: Comment: History of EtOH use      Cardiovascular: Comment: On 2019, s/p left hip replacement at [facility], pt noted to have inferior STEMI pattern on his postprocedure EKG and complained of chest discomfort.  He was transported emergently to [facility] where he went emergently for coronary angiogram, aspiration thrombectomy and angioplasty to the  2.  It was noted to have the appearance of an embolic phenomenon versus a ruptured plaque.  Troponin peaked at 119.975.  A follow-up echocardiogram demonstrated an ejection fraction of 40-45%, mid to distal lateral, inferior lateral and inferior akinesis, grade 1 diastolic dysfunction.  A bubble study confirmed a large amount of immediate crossover of agitated bubbles into the left atrium from the right atrium suggesting significant shunt.  Due to the possibility of a cardiac source of emboli, he was recommended for a HALIMA to rule out left atrial appendage clot    (+) Dyslipidemia, hypertension--CAD, -past MI,-. : . CHF etiology: ischemic CM Last EF: 45 . . :. . Previous cardiac testing       METS/Exercise Tolerance:     Hematologic:         Musculoskeletal:   (+) arthritis, , , -       GI/Hepatic:         Renal/Genitourinary:         Endo:         Psychiatric:         Infectious  "Disease:         Malignancy:         Other: Comment: Follow-up cardiac MRI demonstrated the LV to be 51%, with moderate to severe hypokinesis of the mid inferior, inferior lateral, and inferior lateral wall as well as the apical lateral wall. No cardiac thrombus was detected.                          Physical Exam  Normal systems: cardiovascular, pulmonary and dental    Airway   Mallampati: II  TM distance: >3 FB  Neck ROM: full    Dental     Cardiovascular       Pulmonary             Lab Results   Component Value Date    WBC 8.3 02/28/2019    HGB 9.2 (L) 03/01/2019    HCT 25.7 (L) 02/28/2019     02/28/2019     02/28/2019    POTASSIUM 4.0 02/28/2019    CHLORIDE 108 02/28/2019    CO2 29 02/28/2019    BUN 9 02/28/2019    CR 0.83 02/28/2019     (H) 02/28/2019    ROBINSON 7.9 (L) 02/28/2019    MAG 2.5 (H) 02/27/2019    PTT 26 02/26/2019    INR 1.07 02/28/2019       Preop Vitals  BP Readings from Last 3 Encounters:   03/27/19 121/88   03/19/19 98/60   03/13/19 114/72    Pulse Readings from Last 3 Encounters:   03/19/19 81   03/13/19 96   03/01/19 108      Resp Readings from Last 3 Encounters:   03/27/19 18   03/13/19 16   03/01/19 16    SpO2 Readings from Last 3 Encounters:   03/27/19 96%   03/19/19 97%   03/13/19 97%      Temp Readings from Last 1 Encounters:   03/27/19 37.1  C (98.8  F) (Oral)    Ht Readings from Last 1 Encounters:   03/27/19 1.702 m (5' 7.01\")      Wt Readings from Last 1 Encounters:   03/27/19 72.1 kg (158 lb 15.9 oz)    Estimated body mass index is 24.9 kg/m  as calculated from the following:    Height as of this encounter: 1.702 m (5' 7.01\").    Weight as of this encounter: 72.1 kg (158 lb 15.9 oz).       Anesthesia Plan      History & Physical Review  History and physical reviewed and following examination; no interval change.    ASA Status:  3 .    NPO Status:  > 8 hours    Plan for MAC with Intravenous and Propofol induction. Reason for MAC:  Deep or markedly invasive procedure " (G8) and Difficulty with conscious sedation (QS)         Postoperative Care      Consents  Anesthetic plan, risks, benefits and alternatives discussed with:  Patient..                 Tevin Miller, DO

## 2019-03-28 ENCOUNTER — DOCUMENTATION ONLY (OUTPATIENT)
Dept: CARDIOLOGY | Facility: CLINIC | Age: 49
End: 2019-03-28

## 2019-03-28 DIAGNOSIS — Q21.11 OSTIUM SECUNDUM TYPE ATRIAL SEPTAL DEFECT: Primary | ICD-10-CM

## 2019-03-28 NOTE — PROGRESS NOTES
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 Eugene review films and they feel it is a eustachian valve in RA    And Anastacio thinks not enough rim to close percutaneously     So Plan:  CMR  See Dr. Eugene in congenital as she has appointments before Anastacio and pt is anxious  Canceled the appointment with Archie    Let me know if you want anything else    The HALIMA pictures are interesting--you may want to look at it    Carson    "

## 2019-03-28 NOTE — PROGRESS NOTES
Patient recently underwent HALIMA for possible PFO closure.  HALIMA identified   1. The visual ejection fraction is estimated at 50-55%.  2. There is a large left to right atrial shunt on color doppler and positive  bubble consistent with bidirectional shunting.  3. Moderate to large sized secundum ASD is noted without a significant  anterior rim.  4. 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 Bubble study shows that  fenestration. The ASD opening appears superior to the septal insertion of this  membrane.  5. Given patient tolerance of the procedure, full assessment of rims around  this ASD could not be performed on detailed 3-D imaging.  6. Corelate findings with MRI, consider MRI with flow quantification    Results of HALIMA were reviewed with Dr. Eugene and Dr. Alexandra in the absence of Dr. Almanza and Dr. Yoder.  Patient is recommended he follow up with Dr. Eugene.   Patient underwent an MRI in February but in review of MRI with Dr. Williamson, he recommends a repeat MRI to  specific look for Secundum ASD, Access Rim Tissue and Assess pulmonary Vein anatomy.   Recommendations were reviewed with patient who verbally agrees and wishes to proceed with MRI and follow up.   Orders have been placed.

## 2019-03-29 ENCOUNTER — CARE COORDINATION (OUTPATIENT)
Dept: CARDIOLOGY | Facility: CLINIC | Age: 49
End: 2019-03-29

## 2019-03-29 NOTE — PROGRESS NOTES
Received call from pt asking to not return to work until later as he has more appointments coming up w/ MRI & to see Dr. Almanza & Dr. Eugene.     Discussed with Kathrine Bo NP, recommends ok to be off work until he sees Dr. Eugene & pt does not need to see Dr. Almanza on 4/9/19.     Called pt with recommendations. Apt 4/9/19 canceled. Daljit SANTANA

## 2019-04-01 NOTE — PROGRESS NOTES
Did contact Pt per DR Yoder. MRI moved to 4/8/19 in afternoon 230 with DR Clay royal. Pt informed of need for change of appointment and did agree to this change. KWABENA Malone RN

## 2019-04-01 NOTE — PROGRESS NOTES
Update    He had an MRI when you were caring for him    Kathrine was messaging Ali to review and see if more images are needed

## 2019-04-08 ENCOUNTER — HOSPITAL ENCOUNTER (OUTPATIENT)
Dept: CARDIOLOGY | Facility: CLINIC | Age: 49
Discharge: HOME | End: 2019-04-08
Attending: NURSE PRACTITIONER | Admitting: NURSE PRACTITIONER
Payer: COMMERCIAL

## 2019-04-08 DIAGNOSIS — Q21.11 OSTIUM SECUNDUM TYPE ATRIAL SEPTAL DEFECT: ICD-10-CM

## 2019-04-08 PROCEDURE — 75565 CARD MRI VELOC FLOW MAPPING: CPT | Mod: 26 | Performed by: INTERNAL MEDICINE

## 2019-04-08 PROCEDURE — 71555 MRI ANGIO CHEST W OR W/O DYE: CPT

## 2019-04-08 PROCEDURE — 75557 CARDIAC MRI FOR MORPH: CPT | Mod: 26 | Performed by: INTERNAL MEDICINE

## 2019-04-08 PROCEDURE — 75557 CARDIAC MRI FOR MORPH: CPT

## 2019-04-08 PROCEDURE — A9585 GADOBUTROL INJECTION: HCPCS | Performed by: NURSE PRACTITIONER

## 2019-04-08 PROCEDURE — 25500064 ZZH RX 255 OP 636: Performed by: NURSE PRACTITIONER

## 2019-04-08 PROCEDURE — 71555 MRI ANGIO CHEST W OR W/O DYE: CPT | Mod: 26 | Performed by: INTERNAL MEDICINE

## 2019-04-08 RX ORDER — DIAZEPAM 5 MG
5 TABLET ORAL EVERY 30 MIN PRN
Status: DISCONTINUED | OUTPATIENT
Start: 2019-04-08 | End: 2019-04-09 | Stop reason: HOSPADM

## 2019-04-08 RX ORDER — ACYCLOVIR 200 MG/1
0-1 CAPSULE ORAL
Status: DISCONTINUED | OUTPATIENT
Start: 2019-04-08 | End: 2019-04-09 | Stop reason: HOSPADM

## 2019-04-08 RX ORDER — DIPHENHYDRAMINE HYDROCHLORIDE 50 MG/ML
25-50 INJECTION INTRAMUSCULAR; INTRAVENOUS
Status: DISCONTINUED | OUTPATIENT
Start: 2019-04-08 | End: 2019-04-09 | Stop reason: HOSPADM

## 2019-04-08 RX ORDER — GADOBUTROL 604.72 MG/ML
5-65 INJECTION INTRAVENOUS ONCE
Status: COMPLETED | OUTPATIENT
Start: 2019-04-08 | End: 2019-04-08

## 2019-04-08 RX ORDER — METHYLPREDNISOLONE SODIUM SUCCINATE 125 MG/2ML
125 INJECTION, POWDER, LYOPHILIZED, FOR SOLUTION INTRAMUSCULAR; INTRAVENOUS
Status: DISCONTINUED | OUTPATIENT
Start: 2019-04-08 | End: 2019-04-09 | Stop reason: HOSPADM

## 2019-04-08 RX ORDER — DIPHENHYDRAMINE HCL 25 MG
25 CAPSULE ORAL
Status: DISCONTINUED | OUTPATIENT
Start: 2019-04-08 | End: 2019-04-09 | Stop reason: HOSPADM

## 2019-04-08 RX ORDER — ONDANSETRON 2 MG/ML
4 INJECTION INTRAMUSCULAR; INTRAVENOUS
Status: DISCONTINUED | OUTPATIENT
Start: 2019-04-08 | End: 2019-04-09 | Stop reason: HOSPADM

## 2019-04-08 RX ADMIN — GADOBUTROL 14 ML: 604.72 INJECTION INTRAVENOUS at 09:51

## 2019-04-09 NOTE — PROGRESS NOTES
Taylor Williamson MD  P Cv Mri Tech             Short axis SSFP of atria with no gap, SSFP axial stack. Pulmonary vein MRA. Qp/Qs.    Contrast administered per weight based protocol.

## 2019-04-11 ENCOUNTER — OFFICE VISIT (OUTPATIENT)
Dept: CARDIOLOGY | Facility: CLINIC | Age: 49
End: 2019-04-11
Payer: COMMERCIAL

## 2019-04-11 VITALS
BODY MASS INDEX: 25.51 KG/M2 | SYSTOLIC BLOOD PRESSURE: 125 MMHG | HEART RATE: 87 BPM | HEIGHT: 67 IN | DIASTOLIC BLOOD PRESSURE: 76 MMHG | WEIGHT: 162.5 LBS

## 2019-04-11 DIAGNOSIS — Q21.10 ASD (ATRIAL SEPTAL DEFECT): Primary | ICD-10-CM

## 2019-04-11 DIAGNOSIS — I21.3 ST ELEVATION MYOCARDIAL INFARCTION (STEMI), UNSPECIFIED ARTERY (H): ICD-10-CM

## 2019-04-11 PROCEDURE — 99204 OFFICE O/P NEW MOD 45 MIN: CPT | Performed by: INTERNAL MEDICINE

## 2019-04-11 ASSESSMENT — MIFFLIN-ST. JEOR: SCORE: 1565.73

## 2019-04-11 NOTE — PROGRESS NOTES
HPI:     Please see dictated note    PAST MEDICAL HISTORY:  Past Medical History:   Diagnosis Date     Arthritis      ASCVD (arteriosclerotic cardiovascular disease)      ETOH abuse      HLD (hyperlipidemia)      Hypertension      STEMI (ST elevation myocardial infarction) (H)        CURRENT MEDICATIONS:  Current Outpatient Medications   Medication Sig Dispense Refill     amLODIPine (NORVASC) 10 MG tablet Take 10 mg by mouth daily       aspirin (ASA) 81 MG EC tablet Take 1 tablet (81 mg) by mouth daily 90 tablet 3     atorvastatin (LIPITOR) 40 MG tablet Take 1 tablet (40 mg) by mouth every evening 30 tablet 6     clopidogrel (PLAVIX) 75 MG tablet Take 1 tablet (75 mg) by mouth daily 30 tablet 6     hydrOXYzine (ATARAX) 10 MG tablet Take 1 tablet (10 mg) by mouth 3 times daily as needed for anxiety or other (pain) 30 tablet 0     losartan-hydrochlorothiazide (HYZAAR) 100-25 MG tablet Take 1 tablet by mouth daily 30 tablet 11     methocarbamol (ROBAXIN) 500 MG tablet Take 1 tablet (500 mg) by mouth 4 times daily as needed for muscle spasms 30 tablet 0     metoprolol succinate ER (TOPROL-XL) 50 MG 24 hr tablet Take 1 tablet (50 mg) by mouth daily 30 tablet 10     nitroGLYcerin (NITROSTAT) 0.4 MG sublingual tablet For chest pain place 1 tablet under the tongue every 5 minutes for 3 doses. If symptoms persist 5 minutes after 1st dose call 911. 20 tablet 0     oxyCODONE (ROXICODONE) 5 MG tablet Take 1-2 tablets (5-10 mg) by mouth every 4 hours as needed for moderate to severe pain (Patient not taking: Reported on 4/11/2019) 30 tablet 0       PAST SURGICAL HISTORY:  Past Surgical History:   Procedure Laterality Date     CV HEART CATHETERIZATION WITH POSSIBLE INTERVENTION N/A 2/26/2019    thrombectomy and angioplasty to OM 2     TRANSESOPHAGEAL ECHOCARDIOGRAM INTRAOPERATIVE N/A 3/27/2019    Procedure: TRANSESOPHAGEAL ECHOCARDIOGRAM INTRAOPERATIVE (GENERAL ANESTHESIA);  Surgeon: GENERIC ANESTHESIA PROVIDER;  Location:  OR  "      ALLERGIES   No Known Allergies    FAMILY HISTORY:  History reviewed. No pertinent family history.    SOCIAL HISTORY:  Social History     Socioeconomic History     Marital status:      Spouse name: None     Number of children: None     Years of education: None     Highest education level: None   Occupational History     None   Social Needs     Financial resource strain: None     Food insecurity:     Worry: None     Inability: None     Transportation needs:     Medical: None     Non-medical: None   Tobacco Use     Smoking status: Never Smoker     Smokeless tobacco: Never Used   Substance and Sexual Activity     Alcohol use: No     Frequency: Never     Drug use: No     Sexual activity: Never   Lifestyle     Physical activity:     Days per week: None     Minutes per session: None     Stress: None   Relationships     Social connections:     Talks on phone: None     Gets together: None     Attends Rastafarian service: None     Active member of club or organization: None     Attends meetings of clubs or organizations: None     Relationship status: None     Intimate partner violence:     Fear of current or ex partner: None     Emotionally abused: None     Physically abused: None     Forced sexual activity: None   Other Topics Concern     None   Social History Narrative     None       ROS:   Constitutional: No fever, chills, or sweats. No weight gain/loss   ENT: No visual disturbance, ear ache, epistaxis, sore throat  Allergies/Immunologic: Negative.   Respiratory: No cough, hemoptysia  Cardiovascular: As per HPI  GI: No nausea, vomiting, hematemesis, melena, or hematochezia  : No urinary frequency, dysuria, or hematuria  Integument: Negative  Psychiatric: Negative  Neuro: Negative  Endocrinology: Negative   Musculoskeletal: Negative    EXAM:  /76   Pulse 87   Ht 1.702 m (5' 7\")   Wt 73.7 kg (162 lb 8 oz)   BMI 25.45 kg/m    In general, the patient is a pleasant male in no apparent distress.    HEENT: " NC/AT.  PERRLA.  EOMI.  Sclerae white, not injected.  Nares clear.  Pharynx without erythema or exudate.  Dentition intact.    Neck: No adenopathy.  No thyromegaly. Carotids +4/4 bilaterally without bruits.  No jugular venous distension.   Heart: RRR. Normal S1, S2 splits physiologically. No murmur, rub, click, or gallop. The PMI is in the 5th ICS in the midclavicular line. There is no heave.    Lungs: CTA.  No ronchi, wheezes, rales.    Abdomen: Soft, nontender, nondistended. No organomegaly.  No bruits.   Extremities: No clubbing, cyanosis, or edema.  The pulses are +4/4 at the radial, brachial, femoral, popliteal, DP, and PT sites bilaterally.  No bruits are noted.  Neurologic: Alert and oriented to person/place/time, normal speech, gait and affect  Skin: No petechiae, purpura or rash.    Labs:  LIPID RESULTS:  Lab Results   Component Value Date    CHOL 178 02/26/2019    HDL 58 02/26/2019     (H) 02/26/2019    TRIG 52 02/26/2019    NHDL 120 02/26/2019       LIVER ENZYME RESULTS:  No results found for: AST, ALT    CBC RESULTS:  Lab Results   Component Value Date    WBC 8.3 02/28/2019    RBC 3.42 (L) 02/28/2019    HGB 9.2 (L) 03/01/2019    HCT 25.7 (L) 02/28/2019    MCV 75 (L) 02/28/2019    MCH 24.3 (L) 02/28/2019    MCHC 32.3 02/28/2019    RDW 15.6 (H) 02/28/2019     02/28/2019       BMP RESULTS:  Lab Results   Component Value Date     02/28/2019    POTASSIUM 4.0 02/28/2019    CHLORIDE 108 02/28/2019    CO2 29 02/28/2019    ANIONGAP 2 (L) 02/28/2019     (H) 02/28/2019    BUN 9 02/28/2019    CR 0.83 02/28/2019    GFRESTIMATED >90 02/28/2019    GFRESTBLACK >90 02/28/2019    ROBINSON 7.9 (L) 02/28/2019        A1C RESULTS:  Lab Results   Component Value Date    A1C 5.9 (H) 02/27/2019       INR RESULTS:  Lab Results   Component Value Date    INR 1.07 02/28/2019    INR 1.01 02/26/2019       KLEBER Eugene MD       CC  Patient Care Team:  Paul, [facility] as PCP - General

## 2019-04-11 NOTE — PATIENT INSTRUCTIONS
"You were seen today in the Adult Congenital and Cardiovascular Genetics Clinic at the [facility].    Cardiology Providers you saw during your visit:  Dr. Tiara Eugene     Diagnosis:  ASD    Results:  The results of your     Recommendations:    1.  Continue to eat a heart healthy, low salt diet.  2.  Continue to get 20-30 minutes of aerobic activity, 4-5 days per week.  Examples of aerobic activity include walking, running, swimming, cycling, etc.  3.  Continue to observe good oral hygiene, with regular dental visits.  4.        Vitals:    04/11/19 0938   BP: 125/76   Pulse: 87   Weight: 73.7 kg (162 lb 8 oz)   Height: 1.702 m (5' 7\")     Exercise restrictions:   Yes__X__  No____         If yes, list restrictions:  Must be allowed to rest if fatigued or SOB      Work restrictions:  Yes____  No____         If yes, list restrictions:    FASTING CHOLESTEROL was checked in the last 5 years YES___  NO___   Continue to eat a heart healthy, low salt diet.         ____ Fasting lipid panel order today         ____ No changes in medications          ____ I recommend the following changes in your cholesterol medications.:          ____ Please follow up for cholesterol screening at your primary care physician      Follow-up:  Follow up with Dr. Eugene     If you have questions or concerns please contact us at:    Sal Rogers RN, BSN   Barrera Caldera (Scheduling)  Nurse Coordinator     Clinic   Adult Congenital and CV Genetics Adult Congenital and CV Genetic  [facility]  (P)[phone]    (P) [phone]  [email or web address] (F)[phone]        For after hours urgent needs, call [phone] and ask to speak to the Adult Congenital Physician on call.  Mention Job Code 0401.    For emergencies call 911.    [facility]   [facility]  Mail Code " [id number]  [address] [place]  [address]

## 2019-04-11 NOTE — LETTER
2019      RE: Ross Smith  [address]  [place] [address]         To Whom It May Concern,    Ross Smith (: 1970) was seen in the Adult Congenital and Cardiovascular Genetics Clinic at the [facility].  Based on testing and my clinical evaluation Ross does not need any cardiovascular restrictions.  He should just be following orthopedic restrictions set forth for his hip.    Please do not hesitate to call our clinic at [phone] if you have any questions or concerns.    Sincerely,      Tomas Eugnee MD

## 2019-04-11 NOTE — LETTER
4/11/2019    [facility]  [address] [facility] [place] [address]    RE: Ross Smith       Dear Colleague,    I had the pleasure of seeing Ross Smith in the [facility].    HPI:     Please see dictated note    PAST MEDICAL HISTORY:  Past Medical History:   Diagnosis Date     Arthritis      ASCVD (arteriosclerotic cardiovascular disease)      ETOH abuse      HLD (hyperlipidemia)      Hypertension      STEMI (ST elevation myocardial infarction) (H)        CURRENT MEDICATIONS:  Current Outpatient Medications   Medication Sig Dispense Refill     amLODIPine (NORVASC) 10 MG tablet Take 10 mg by mouth daily       aspirin (ASA) 81 MG EC tablet Take 1 tablet (81 mg) by mouth daily 90 tablet 3     atorvastatin (LIPITOR) 40 MG tablet Take 1 tablet (40 mg) by mouth every evening 30 tablet 6     clopidogrel (PLAVIX) 75 MG tablet Take 1 tablet (75 mg) by mouth daily 30 tablet 6     hydrOXYzine (ATARAX) 10 MG tablet Take 1 tablet (10 mg) by mouth 3 times daily as needed for anxiety or other (pain) 30 tablet 0     losartan-hydrochlorothiazide (HYZAAR) 100-25 MG tablet Take 1 tablet by mouth daily 30 tablet 11     methocarbamol (ROBAXIN) 500 MG tablet Take 1 tablet (500 mg) by mouth 4 times daily as needed for muscle spasms 30 tablet 0     metoprolol succinate ER (TOPROL-XL) 50 MG 24 hr tablet Take 1 tablet (50 mg) by mouth daily 30 tablet 10     nitroGLYcerin (NITROSTAT) 0.4 MG sublingual tablet For chest pain place 1 tablet under the tongue every 5 minutes for 3 doses. If symptoms persist 5 minutes after 1st dose call 911. 20 tablet 0     oxyCODONE (ROXICODONE) 5 MG tablet Take 1-2 tablets (5-10 mg) by mouth every 4 hours as needed for moderate to severe pain (Patient not taking: Reported on 4/11/2019) 30 tablet 0       PAST SURGICAL HISTORY:  Past Surgical History:   Procedure Laterality Date     CV HEART CATHETERIZATION WITH POSSIBLE INTERVENTION N/A 2/26/2019     thrombectomy and angioplasty to OM 2     TRANSESOPHAGEAL ECHOCARDIOGRAM INTRAOPERATIVE N/A 3/27/2019    Procedure: TRANSESOPHAGEAL ECHOCARDIOGRAM INTRAOPERATIVE (GENERAL ANESTHESIA);  Surgeon: GENERIC ANESTHESIA PROVIDER;  Location: [facility] OR       ALLERGIES   No Known Allergies    FAMILY HISTORY:  History reviewed. No pertinent family history.    SOCIAL HISTORY:  Social History     Socioeconomic History     Marital status:      Spouse name: None     Number of children: None     Years of education: None     Highest education level: None   Occupational History     None   Social Needs     Financial resource strain: None     Food insecurity:     Worry: None     Inability: None     Transportation needs:     Medical: None     Non-medical: None   Tobacco Use     Smoking status: Never Smoker     Smokeless tobacco: Never Used   Substance and Sexual Activity     Alcohol use: No     Frequency: Never     Drug use: No     Sexual activity: Never   Lifestyle     Physical activity:     Days per week: None     Minutes per session: None     Stress: None   Relationships     Social connections:     Talks on phone: None     Gets together: None     Attends Sikhism service: None     Active member of club or organization: None     Attends meetings of clubs or organizations: None     Relationship status: None     Intimate partner violence:     Fear of current or ex partner: None     Emotionally abused: None     Physically abused: None     Forced sexual activity: None   Other Topics Concern     None   Social History Narrative     None       ROS:   Constitutional: No fever, chills, or sweats. No weight gain/loss   ENT: No visual disturbance, ear ache, epistaxis, sore throat  Allergies/Immunologic: Negative.   Respiratory: No cough, hemoptysia  Cardiovascular: As per HPI  GI: No nausea, vomiting, hematemesis, melena, or hematochezia  : No urinary frequency, dysuria, or hematuria  Integument: Negative  Psychiatric: Negative  Neuro:  "Negative  Endocrinology: Negative   Musculoskeletal: Negative    EXAM:  /76   Pulse 87   Ht 1.702 m (5' 7\")   Wt 73.7 kg (162 lb 8 oz)   BMI 25.45 kg/m     In general, the patient is a pleasant male in no apparent distress.    HEENT: NC/AT.  PERRLA.  EOMI.  Sclerae white, not injected.  Nares clear.  Pharynx without erythema or exudate.  Dentition intact.    Neck: No adenopathy.  No thyromegaly. Carotids +4/4 bilaterally without bruits.  No jugular venous distension.   Heart: RRR. Normal S1, S2 splits physiologically. No murmur, rub, click, or gallop. The PMI is in the 5th ICS in the midclavicular line. There is no heave.    Lungs: CTA.  No ronchi, wheezes, rales.    Abdomen: Soft, nontender, nondistended. No organomegaly.  No bruits.   Extremities: No clubbing, cyanosis, or edema.  The pulses are +4/4 at the radial, brachial, femoral, popliteal, DP, and PT sites bilaterally.  No bruits are noted.  Neurologic: Alert and oriented to person/place/time, normal speech, gait and affect  Skin: No petechiae, purpura or rash.    Labs:  LIPID RESULTS:  Lab Results   Component Value Date    CHOL 178 02/26/2019    HDL 58 02/26/2019     (H) 02/26/2019    TRIG 52 02/26/2019    NHDL 120 02/26/2019       LIVER ENZYME RESULTS:  No results found for: AST, ALT    CBC RESULTS:  Lab Results   Component Value Date    WBC 8.3 02/28/2019    RBC 3.42 (L) 02/28/2019    HGB 9.2 (L) 03/01/2019    HCT 25.7 (L) 02/28/2019    MCV 75 (L) 02/28/2019    MCH 24.3 (L) 02/28/2019    MCHC 32.3 02/28/2019    RDW 15.6 (H) 02/28/2019     02/28/2019       BMP RESULTS:  Lab Results   Component Value Date     02/28/2019    POTASSIUM 4.0 02/28/2019    CHLORIDE 108 02/28/2019    CO2 29 02/28/2019    ANIONGAP 2 (L) 02/28/2019     (H) 02/28/2019    BUN 9 02/28/2019    CR 0.83 02/28/2019    GFRESTIMATED >90 02/28/2019    GFRESTBLACK >90 02/28/2019    ROBINSON 7.9 (L) 02/28/2019        A1C RESULTS:  Lab Results   Component Value " Date    A1C 5.9 (H) 02/27/2019       INR RESULTS:  Lab Results   Component Value Date    INR 1.07 02/28/2019    INR 1.01 02/26/2019       KLEBER Eugene MD       CC  Patient Care Team:  Paul, [facility] as PCP - General      Service Date: 04/11/2019      HISTORY OF PRESENT ILLNESS:  Mr. Smiht is a very pleasant 48-year-old gentleman who has a past medical history that was unremarkable up until this February.  He was getting his left hip replaced and when he was coming out of anesthesia, he complained of chest pain.  His troponin peaked at 119.  He was taken to the Cath Lab where he was found to probably have an embolic occlusion of his OM branch.  They aspirated, no stent was placed and placed him on Plavix.  He ended up having an echocardiogram and that showed ejection fraction of 40%-45%.  Then the next day, he had a bubble study given the concern for an embolic event and that showed a large amount of contrast crossing the septum.  He was going to have a HALIMA performed, but he was not able to tolerate it initially and then had it performed on 03/27/2019.  I personally reviewed those images.  He does have a secundum atrial septal defect with a eustachian valve that is prominent on the right side and no other significant issues.  He was then sent on for a cardiac MRI, which he had done on 04/08/2019 and that showed normal RV size and function.  LV was mildly reduced at 46% with some hypokinesis of the mid inferior inferolateral walls consistent with his circumflex distribution infarct and atrial septal defect which they measured at 11 mm.  They calculated a Qp/Qs of 1.3:1.  Mr. Smith has not returned to work yet.  He was waiting for cardiac clearance to do so.  He is feeling well.  He does not have any significant palpitations or arrhythmias.  There is no chest pain or discomfort with exertion.  He did do some cardiac rehabilitation.  He is on a good cardiac medication regimen.  His blood pressure  is well-controlled.  He is eating a heart healthy diet.  I do note that he has noticed that he has iron deficiency anemia.  His hemoglobin was 9.2 when it was checked in March and his MCV was 75.  He is not on iron at this time.  They did do a hypercoagulable workup and that was negative.  There is no family history of congenital heart disease or early coronary artery disease.      IMPRESSION/PLAN:   1.  Atrial septal defect, small, with normal RV size.   2.  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   3.  Iron deficiency anemia.      DISCUSSION:  It was a pleasure being involved in the care of Mr. Smith today.  I reviewed all of his records and imaging.  It did appear like he had an embolic infarct involving the circumflex OM branch and this likely is in the setting of his atrial septal defect.  We discussed that from the standpoint of his atrial septal defect alone, with his RV size being normal, we would not need to usually close it.  However, if somebody has had a stroke in the setting of an atrial septal defect, then that is an indication to close the defect and we could extrapolate that to include likely an embolic MI.  However, complicating things is the fact that he was kind of provoked in the setting of his hip surgery.      At this point, he is hesitant to have anything else done.  He just wants to get back to work, but would consider it in the future.  I do not feel that there is a rush.  We did discuss that I would put filters on the IVs in the setting of his ASD.  He would continue usually with the Plavix for a year after the was performed and will probably come off that in February.  He does not have any cardiac limitation at this point.  I do think the defect could probably be closed relatively safely, even with a small rim in the Cath Lab.  That being said, the HALIMA images were not very helpful, so we would have to determine at the time, of course.  He understands  all this.  We will plan to see him back in 6 months.  I did recommend he start iron supplementation, and need to recheck his labs in 3 months.  All questions were answered.  It was a pleasure to see him.  Please do not hesitate to contact me with any questions or concerns.         KELLI EUGENE MD             D: 2019   T: 2019   MT: al      Name:     ARIANNA GALLEGO   MRN:      [id number]        Account:      [id number]   :      1970           Service Date: 2019      Document: [id number]        Thank you for allowing me to participate in the care of your patient.      Sincerely,     Kelli Eugene MD     [facility]    cc:   No referring provider defined for this encounter.

## 2019-04-11 NOTE — LETTER
4/11/2019      [facility]  [address]  [place] [address]      RE: Ross Easonaver       Dear Colleague,    I had the pleasure of seeing Ross Smith in the [facility].    Service Date: 04/11/2019      HISTORY OF PRESENT ILLNESS:  Mr. Smith is a very pleasant 48-year-old gentleman who has a past medical history that was unremarkable up until this February.  He was getting his left hip replaced and when he was coming out of anesthesia, he complained of chest pain.  His troponin peaked at 119.  He was taken to the Cath Lab where he was found to probably have an embolic occlusion of his OM branch.  They aspirated, no stent was placed and placed him on Plavix.  He ended up having an echocardiogram and that showed ejection fraction of 40%-45%.  Then the next day, he had a bubble study given the concern for an embolic event and that showed a large amount of contrast crossing the septum.  He was going to have a HALIMA performed, but he was not able to tolerate it initially and then had it performed on 03/27/2019.  I personally reviewed those images.  He does have a secundum atrial septal defect with a eustachian valve that is prominent on the right side and no other significant issues.  He was then sent on for a cardiac MRI, which he had done on 04/08/2019 and that showed normal RV size and function.  LV was mildly reduced at 46% with some hypokinesis of the mid inferior inferolateral walls consistent with his circumflex distribution infarct and atrial septal defect which they measured at 11 mm.  They calculated a Qp/Qs of 1.3:1.  Mr. Smith has not returned to work yet.  He was waiting for cardiac clearance to do so.  He is feeling well.  He does not have any significant palpitations or arrhythmias.  There is no chest pain or discomfort with exertion.  He did do some cardiac rehabilitation.  He is on a good cardiac medication regimen.  His blood pressure is  well-controlled.  He is eating a heart healthy diet.  I do note that he has noticed that he has iron deficiency anemia.  His hemoglobin was 9.2 when it was checked in March and his MCV was 75.  He is not on iron at this time.  They did do a hypercoagulable workup and that was negative.  There is no family history of congenital heart disease or early coronary artery disease.      IMPRESSION/PLAN:   1.  Atrial septal defect, small, with normal RV size.   2.  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   3.  Iron deficiency anemia.      DISCUSSION:  It was a pleasure being involved in the care of Mr. Smith today.  I reviewed all of his records and imaging.  It did appear like he had an embolic infarct involving the circumflex OM branch and this likely is in the setting of his atrial septal defect.  We discussed that from the standpoint of his atrial septal defect alone, with his RV size being normal, we would not need to usually close it.  However, if somebody has had a stroke in the setting of an atrial septal defect, then that is an indication to close the defect and we could extrapolate that to include likely an embolic MI.  However, complicating things is the fact that he was kind of provoked in the setting of his hip surgery.      At this point, he is hesitant to have anything else done.  He just wants to get back to work, but would consider it in the future.  I do not feel that there is a rush.  We did discuss that I would put filters on the IVs in the setting of his ASD.  He would continue usually with the Plavix for a year after the was performed and will probably come off that in February.  He does not have any cardiac limitation at this point.  I do think the defect could probably be closed relatively safely, even with a small rim in the Cath Lab.  That being said, the HALIMA images were not very helpful, so we would have to determine at the time, of course.  He understands all  this.  We will plan to see him back in 6 months.  I did recommend he start iron supplementation, and need to recheck his labs in 3 months.  All questions were answered.  It was a pleasure to see him.  Please do not hesitate to contact me with any questions or concerns.         KELLI BAE MD             D: 2019   T: 2019   MT: al      Name:     ARIANNA GALLEGO   MRN:      [id number]        Account:      [id number]   :      1970           Service Date: 2019      Document: [id number]           Outpatient Encounter Medications as of 2019   Medication Sig Dispense Refill     amLODIPine (NORVASC) 10 MG tablet Take 10 mg by mouth daily       aspirin (ASA) 81 MG EC tablet Take 1 tablet (81 mg) by mouth daily 90 tablet 3     atorvastatin (LIPITOR) 40 MG tablet Take 1 tablet (40 mg) by mouth every evening 30 tablet 6     clopidogrel (PLAVIX) 75 MG tablet Take 1 tablet (75 mg) by mouth daily 30 tablet 6     ferrous fumarate 65 mg, [place]. FE,-Vitamin C 125 mg (VITRON-C)  MG TABS tablet Take 1 tablet by mouth daily 90 tablet 3     hydrOXYzine (ATARAX) 10 MG tablet Take 1 tablet (10 mg) by mouth 3 times daily as needed for anxiety or other (pain) 30 tablet 0     losartan-hydrochlorothiazide (HYZAAR) 100-25 MG tablet Take 1 tablet by mouth daily 30 tablet 11     methocarbamol (ROBAXIN) 500 MG tablet Take 1 tablet (500 mg) by mouth 4 times daily as needed for muscle spasms 30 tablet 0     metoprolol succinate ER (TOPROL-XL) 50 MG 24 hr tablet Take 1 tablet (50 mg) by mouth daily 30 tablet 10     nitroGLYcerin (NITROSTAT) 0.4 MG sublingual tablet For chest pain place 1 tablet under the tongue every 5 minutes for 3 doses. If symptoms persist 5 minutes after 1st dose call 911. 20 tablet 0     oxyCODONE (ROXICODONE) 5 MG tablet Take 1-2 tablets (5-10 mg) by mouth every 4 hours as needed for moderate to severe pain (Patient not taking: Reported on 2019) 30 tablet 0     [DISCONTINUED]  order for DME Equipment being ordered: Walker Wheels () and Walker ()  Treatment Diagnosis: Impaired gait (Patient not taking: Reported on 4/11/2019) 1 each 0     No facility-administered encounter medications on file as of 4/11/2019.        Again, thank you for allowing me to participate in the care of your patient.      Sincerely,    Tomas Eugene MD     [facility]

## 2019-04-11 NOTE — NURSING NOTE
Chief Complaint   Patient presents with     Heart Problem        Cardiac Testing: Patient given instructions regarding  echocardiogram . Discussed purpose, preparation, procedure and when to expect results reported back to the patient. Patient demonstrated understanding of this information and agreed to call with further questions or concerns.  Med Reconcile: Reviewed and verified all current medications with the patient. The updated medication list was printed and given to the patient.  Return Appointment: Patient given instructions regarding scheduling next clinic visit. Patient demonstrated understanding of this information and agreed to call with further questions or concerns.  Patient stated he understood all health information given and agreed to call with further questions or concerns.     Sal Rogers RN, BSN  Cardiology Care Coordinator  [facility]  [email or web address]  [phone]

## 2019-04-11 NOTE — PROGRESS NOTES
Service Date: 04/11/2019      HISTORY OF PRESENT ILLNESS:  Mr. Smith is a very pleasant 48-year-old gentleman who has a past medical history that was unremarkable up until this February.  He was getting his left hip replaced and when he was coming out of anesthesia, he complained of chest pain.  His troponin peaked at 119.  He was taken to the Cath Lab where he was found to probably have an embolic occlusion of his OM branch.  They aspirated, no stent was placed and placed him on Plavix.  He ended up having an echocardiogram and that showed ejection fraction of 40%-45%.  Then the next day, he had a bubble study given the concern for an embolic event and that showed a large amount of contrast crossing the septum.  He was going to have a HALIMA performed, but he was not able to tolerate it initially and then had it performed on 03/27/2019.  I personally reviewed those images.  He does have a secundum atrial septal defect with a eustachian valve that is prominent on the right side and no other significant issues.  He was then sent on for a cardiac MRI, which he had done on 04/08/2019 and that showed normal RV size and function.  LV was mildly reduced at 46% with some hypokinesis of the mid inferior inferolateral walls consistent with his circumflex distribution infarct and atrial septal defect which they measured at 11 mm.  They calculated a Qp/Qs of 1.3:1.  Mr. Smith has not returned to work yet.  He was waiting for cardiac clearance to do so.  He is feeling well.  He does not have any significant palpitations or arrhythmias.  There is no chest pain or discomfort with exertion.  He did do some cardiac rehabilitation.  He is on a good cardiac medication regimen.  His blood pressure is well-controlled.  He is eating a heart healthy diet.  I do note that he has noticed that he has iron deficiency anemia.  His hemoglobin was 9.2 when it was checked in March and his MCV was 75.  He is not on iron at this time.  They did do  a hypercoagulable workup and that was negative.  There is no family history of congenital heart disease or early coronary artery disease.      IMPRESSION/PLAN:   1.  Atrial septal defect, small, with normal RV size.   2.  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   3.  Iron deficiency anemia.      DISCUSSION:  It was a pleasure being involved in the care of Mr. Smith today.  I reviewed all of his records and imaging.  It did appear like he had an embolic infarct involving the circumflex OM branch and this likely is in the setting of his atrial septal defect.  We discussed that from the standpoint of his atrial septal defect alone, with his RV size being normal, we would not need to usually close it.  However, if somebody has had a stroke in the setting of an atrial septal defect, then that is an indication to close the defect and we could extrapolate that to include likely an embolic MI.  However, complicating things is the fact that he was kind of provoked in the setting of his hip surgery.      At this point, he is hesitant to have anything else done.  He just wants to get back to work, but would consider it in the future.  I do not feel that there is a rush.  We did discuss that I would put filters on the IVs in the setting of his ASD.  He would continue usually with the Plavix for a year after the was performed and will probably come off that in February.  He does not have any cardiac limitation at this point.  I do think the defect could probably be closed relatively safely, even with a small rim in the Cath Lab.  That being said, the HALIMA images were not very helpful, so we would have to determine at the time, of course.  He understands all this.  We will plan to see him back in 6 months.  I did recommend he start iron supplementation, and need to recheck his labs in 3 months.  All questions were answered.  It was a pleasure to see him.  Please do not hesitate to contact me  with any questions or concerns.         KELLI BAE MD             D: 2019   T: 2019   MT: al      Name:     ARIANNA GALLEGO   MRN:      [id number]        Account:      [id number]   :      1970           Service Date: 2019      Document: [id number]

## 2020-01-13 ENCOUNTER — TELEPHONE (OUTPATIENT)
Dept: CARDIOLOGY | Facility: CLINIC | Age: 50
End: 2020-01-13

## 2020-01-13 DIAGNOSIS — I21.3 ST ELEVATION MYOCARDIAL INFARCTION (STEMI), UNSPECIFIED ARTERY (H): Primary | ICD-10-CM

## 2020-01-13 DIAGNOSIS — Q21.10 ASD (ATRIAL SEPTAL DEFECT): ICD-10-CM

## 2020-01-13 NOTE — TELEPHONE ENCOUNTER
Left VM for Ross regarding appointment on Thursday. Provider would like labs prior to appointment.    Gave callback to confirm.    Jenniffer Ceballos CMA

## 2020-01-16 ENCOUNTER — HOSPITAL ENCOUNTER (OUTPATIENT)
Dept: LAB | Facility: CLINIC | Age: 50
End: 2020-01-16
Attending: INTERNAL MEDICINE
Payer: COMMERCIAL

## 2020-01-16 ENCOUNTER — HOSPITAL ENCOUNTER (OUTPATIENT)
Dept: CARDIOLOGY | Facility: CLINIC | Age: 50
Discharge: HOME | End: 2020-01-16
Attending: INTERNAL MEDICINE | Admitting: INTERNAL MEDICINE
Payer: COMMERCIAL

## 2020-01-16 ENCOUNTER — OFFICE VISIT (OUTPATIENT)
Dept: CARDIOLOGY | Facility: CLINIC | Age: 50
End: 2020-01-16
Payer: COMMERCIAL

## 2020-01-16 VITALS
BODY MASS INDEX: 25 KG/M2 | WEIGHT: 159.3 LBS | DIASTOLIC BLOOD PRESSURE: 72 MMHG | SYSTOLIC BLOOD PRESSURE: 110 MMHG | HEIGHT: 67 IN | HEART RATE: 64 BPM

## 2020-01-16 DIAGNOSIS — Q21.10 ASD (ATRIAL SEPTAL DEFECT): ICD-10-CM

## 2020-01-16 DIAGNOSIS — I25.5 ISCHEMIC CARDIOMYOPATHY: ICD-10-CM

## 2020-01-16 DIAGNOSIS — I21.3 ST ELEVATION MYOCARDIAL INFARCTION (STEMI), UNSPECIFIED ARTERY (H): ICD-10-CM

## 2020-01-16 LAB
ALBUMIN SERPL-MCNC: 3.7 G/DL (ref 3.4–5)
ALP SERPL-CCNC: 52 U/L (ref 40–150)
ALT SERPL W P-5'-P-CCNC: 18 U/L (ref 0–70)
ANION GAP SERPL CALCULATED.3IONS-SCNC: 5 MMOL/L (ref 3–14)
AST SERPL W P-5'-P-CCNC: 11 U/L (ref 0–45)
BASOPHILS # BLD AUTO: 0 10E9/L (ref 0–0.2)
BASOPHILS NFR BLD AUTO: 0.4 %
BILIRUB SERPL-MCNC: 0.4 MG/DL (ref 0.2–1.3)
BUN SERPL-MCNC: 18 MG/DL (ref 7–30)
CALCIUM SERPL-MCNC: 9.3 MG/DL (ref 8.5–10.1)
CHLORIDE SERPL-SCNC: 107 MMOL/L (ref 94–109)
CO2 SERPL-SCNC: 26 MMOL/L (ref 20–32)
CREAT SERPL-MCNC: 0.9 MG/DL (ref 0.66–1.25)
DIFFERENTIAL METHOD BLD: ABNORMAL
EOSINOPHIL # BLD AUTO: 0.1 10E9/L (ref 0–0.7)
EOSINOPHIL NFR BLD AUTO: 1.7 %
ERYTHROCYTE [DISTWIDTH] IN BLOOD BY AUTOMATED COUNT: 15.2 % (ref 10–15)
GFR SERPL CREATININE-BSD FRML MDRD: >90 ML/MIN/{1.73_M2}
GLUCOSE SERPL-MCNC: 107 MG/DL (ref 70–99)
HCT VFR BLD AUTO: 41.8 % (ref 40–53)
HGB BLD-MCNC: 13.4 G/DL (ref 13.3–17.7)
IMM GRANULOCYTES # BLD: 0 10E9/L (ref 0–0.4)
IMM GRANULOCYTES NFR BLD: 0.1 %
LYMPHOCYTES # BLD AUTO: 3 10E9/L (ref 0.8–5.3)
LYMPHOCYTES NFR BLD AUTO: 42.5 %
MCH RBC QN AUTO: 23.7 PG (ref 26.5–33)
MCHC RBC AUTO-ENTMCNC: 32.1 G/DL (ref 31.5–36.5)
MCV RBC AUTO: 74 FL (ref 78–100)
MONOCYTES # BLD AUTO: 0.4 10E9/L (ref 0–1.3)
MONOCYTES NFR BLD AUTO: 5.9 %
NEUTROPHILS # BLD AUTO: 3.4 10E9/L (ref 1.6–8.3)
NEUTROPHILS NFR BLD AUTO: 49.4 %
NRBC # BLD AUTO: 0 10*3/UL
NRBC BLD AUTO-RTO: 0 /100
PLATELET # BLD AUTO: 314 10E9/L (ref 150–450)
POTASSIUM SERPL-SCNC: 4.1 MMOL/L (ref 3.4–5.3)
PROT SERPL-MCNC: 7.6 G/DL (ref 6.8–8.8)
RBC # BLD AUTO: 5.65 10E12/L (ref 4.4–5.9)
SODIUM SERPL-SCNC: 138 MMOL/L (ref 133–144)
WBC # BLD AUTO: 7 10E9/L (ref 4–11)

## 2020-01-16 PROCEDURE — 85025 COMPLETE CBC W/AUTO DIFF WBC: CPT | Performed by: INTERNAL MEDICINE

## 2020-01-16 PROCEDURE — 99214 OFFICE O/P EST MOD 30 MIN: CPT | Performed by: INTERNAL MEDICINE

## 2020-01-16 PROCEDURE — 80053 COMPREHEN METABOLIC PANEL: CPT | Performed by: INTERNAL MEDICINE

## 2020-01-16 PROCEDURE — 93303 ECHO TRANSTHORACIC: CPT

## 2020-01-16 PROCEDURE — 36415 COLL VENOUS BLD VENIPUNCTURE: CPT | Performed by: INTERNAL MEDICINE

## 2020-01-16 RX ORDER — LOSARTAN POTASSIUM 100 MG/1
100 TABLET ORAL DAILY
COMMUNITY
End: 2020-01-16

## 2020-01-16 RX ORDER — METOPROLOL SUCCINATE 50 MG/1
50 TABLET, EXTENDED RELEASE ORAL DAILY
Qty: 30 TABLET | Refills: 11 | Status: SHIPPED | OUTPATIENT
Start: 2020-01-16 | End: 2023-12-15 | Stop reason: ALTCHOICE

## 2020-01-16 RX ORDER — ATORVASTATIN CALCIUM 40 MG/1
40 TABLET, FILM COATED ORAL EVERY EVENING
Qty: 30 TABLET | Refills: 11 | Status: SHIPPED | OUTPATIENT
Start: 2020-01-16

## 2020-01-16 RX ORDER — HYDROCHLOROTHIAZIDE 25 MG/1
25 TABLET ORAL DAILY
COMMUNITY
End: 2020-01-16

## 2020-01-16 RX ORDER — LOSARTAN POTASSIUM AND HYDROCHLOROTHIAZIDE 25; 100 MG/1; MG/1
1 TABLET ORAL DAILY
Qty: 30 TABLET | Refills: 11 | Status: SHIPPED | OUTPATIENT
Start: 2020-01-16

## 2020-01-16 ASSESSMENT — MIFFLIN-ST. JEOR: SCORE: 1546.21

## 2020-01-16 NOTE — LETTER
1/16/2020    [facility]  [address] [facility] [place] [address]    RE: Ross Luis       Dear Colleague,    I had the pleasure of seeing Ross Smith in the [facility].    HPI and Plan:   I had the pleasure of seeing Ross Smith today in a shared visit with Dr. Eugene in adult congential cardiology clinic follow up. He is a pleasant 49 year old patient of Dr. Eugene with a history of ASD.    Mr. Smith had planned hip replacement in February 2019, he developed chest pain coming out of anesthesia and its troponins peaked to the 119.  In the Cath Lab he had an embolic occlusion of the OM which was aspirated but not stented.  He was put on dual antiplatelet therapy.  His echo showed a EF of 40 to 45% and his bubble study was positive with a large amount of contrast crossing the septum.  He has a secundum atrial septal defect with eustachian valve problem.  Cardiac MRI on April 8, 2019 showed normal RV size and function with an LV that was mildly reduced at 46% and some hypokinesis of the mid inferior lateral wall consistent with circumflex infarct.  The left ear G measured 11 mm,the calculated Qp/Qs of 1.3:1.  He was noted to be a little bit anemic following the surgery, he took iron supplements and his hemoglobin has improved from 9.2 up to 13.4.  He has no family history of congenital heart disease or early coronary disease.    Today he is doing well.  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  He does wonder if he can come off of any of his medications and he wonders if he has to have hip surgery again, which anticipates he may need the other hip replaced, what we can do to help reduce his risk for repeat embolic event.  We reviewed the results of his echocardiogram today, LV is still reduced at 46%.      Labs Reviewed: Sodium 138, testing 4.1, creatinine 0.90, hemoglobin 13.4,     Physical  Exam  Please see Below     Assessment and Plan  1.  Atrial septal defect.  This is small with normal RV size.  We will continue to follow this closely with a echo again in a year.  If he is having surgery, I have asked him to call our office so we can discuss anticoagulation.  He should have filters on IVs.  2.  Myocardial infarction following surgery.  He had an embolic occlusion of the OM branch which was aspirated, not stented.  He was placed on dual antiplatelet therapy, he can stop Plavix on the anniversary of his event, February 26.  He should continue on lifelong aspirin, statin.  3.  Ischemic cardiomyopathy.  He has been on good medical therapy.  His EF is still reduced at 46%.  He should continue beta-blocker and ARB therapy.  4.  Hypertension.  Controlled on amlodipine, Toprol and losartan hydrochlorothiazide.    Thank you for allowing me to care for Ross Smith today.    XENIA Wiley, CNP  Cardiology    This is a shared visit between myself and XENIA Mcgarry. I was present for the majority of the visit. This patient was seen and examined by me and I reviewed all testing. We discussed the patient and plan of care including medication changes. I agree with the plan outlined above.  Discussed that Plavix can be stopped February 26th and that should he have any additional surgeries on his knees/hips that I would recommend anticoagulation after the surgery given his history.  We could consider closing the hole, but he has been hesitant to do that in the past. We will continue to follow him regularly.   Dr. Tomas Eugene MD      Orders Placed This Encounter   Procedures     Adult Congenital and CV Genetics Clinic     Echo Congenital Adult     Orders Placed This Encounter   Medications     DISCONTD: losartan (COZAAR) 100 MG tablet     Sig: Take 100 mg by mouth daily     DISCONTD: hydrochlorothiazide (HYDRODIURIL) 25 MG tablet     Sig: Take 25 mg by mouth daily      losartan-hydrochlorothiazide (HYZAAR) 100-25 MG tablet     Sig: Take 1 tablet by mouth daily     Dispense:  30 tablet     Refill:  11     Medications Discontinued During This Encounter   Medication Reason     oxyCODONE (ROXICODONE) 5 MG tablet Therapy completed     losartan-hydrochlorothiazide (HYZAAR) 100-25 MG tablet Dose adjustment     hydrochlorothiazide (HYDRODIURIL) 25 MG tablet      losartan (COZAAR) 100 MG tablet          CURRENT MEDICATIONS:  Current Outpatient Medications   Medication Sig Dispense Refill     amLODIPine (NORVASC) 10 MG tablet Take 10 mg by mouth daily       aspirin (ASA) 81 MG EC tablet Take 1 tablet (81 mg) by mouth daily 90 tablet 3     atorvastatin (LIPITOR) 40 MG tablet Take 1 tablet (40 mg) by mouth every evening 30 tablet 6     clopidogrel (PLAVIX) 75 MG tablet Take 1 tablet (75 mg) by mouth daily 30 tablet 6     ferrous fumarate 65 mg, [place]. FE,-Vitamin C 125 mg (VITRON-C)  MG TABS tablet Take 1 tablet by mouth daily 90 tablet 3     losartan-hydrochlorothiazide (HYZAAR) 100-25 MG tablet Take 1 tablet by mouth daily 30 tablet 11     methocarbamol (ROBAXIN) 500 MG tablet Take 1 tablet (500 mg) by mouth 4 times daily as needed for muscle spasms 30 tablet 0     metoprolol succinate ER (TOPROL-XL) 50 MG 24 hr tablet Take 1 tablet (50 mg) by mouth daily 30 tablet 10     nitroGLYcerin (NITROSTAT) 0.4 MG sublingual tablet For chest pain place 1 tablet under the tongue every 5 minutes for 3 doses. If symptoms persist 5 minutes after 1st dose call 911. 20 tablet 0     hydrOXYzine (ATARAX) 10 MG tablet Take 1 tablet (10 mg) by mouth 3 times daily as needed for anxiety or other (pain) (Patient not taking: Reported on 1/16/2020) 30 tablet 0       ALLERGIES   No Known Allergies    PAST MEDICAL HISTORY:  Past Medical History:   Diagnosis Date     Arthritis      ASCVD (arteriosclerotic cardiovascular disease)      ETOH abuse      HLD (hyperlipidemia)      Hypertension      STEMI (ST elevation  myocardial infarction) (H)        PAST SURGICAL HISTORY:  Past Surgical History:   Procedure Laterality Date     CV HEART CATHETERIZATION WITH POSSIBLE INTERVENTION N/A 2/26/2019    thrombectomy and angioplasty to OM 2     TRANSESOPHAGEAL ECHOCARDIOGRAM INTRAOPERATIVE N/A 3/27/2019    Procedure: TRANSESOPHAGEAL ECHOCARDIOGRAM INTRAOPERATIVE (GENERAL ANESTHESIA);  Surgeon: GENERIC ANESTHESIA PROVIDER;  Location:  OR       FAMILY HISTORY:  History reviewed. No pertinent family history.    SOCIAL HISTORY:  Social History     Socioeconomic History     Marital status:      Spouse name: None     Number of children: None     Years of education: None     Highest education level: None   Occupational History     None   Social Needs     Financial resource strain: None     Food insecurity:     Worry: None     Inability: None     Transportation needs:     Medical: None     Non-medical: None   Tobacco Use     Smoking status: Never Smoker     Smokeless tobacco: Never Used   Substance and Sexual Activity     Alcohol use: No     Frequency: Never     Drug use: No     Sexual activity: Never   Lifestyle     Physical activity:     Days per week: None     Minutes per session: None     Stress: None   Relationships     Social connections:     Talks on phone: None     Gets together: None     Attends Hindu service: None     Active member of club or organization: None     Attends meetings of clubs or organizations: None     Relationship status: None     Intimate partner violence:     Fear of current or ex partner: None     Emotionally abused: None     Physically abused: None     Forced sexual activity: None   Other Topics Concern     None   Social History Narrative     None       Review of Systems:  Skin:  Negative       Eyes:  Positive for glasses    ENT:  Negative      Respiratory:  Negative       Cardiovascular:    palpitations;Positive for occ  Gastroenterology: Negative      Genitourinary:  Negative      Musculoskeletal:   "Negative      Neurologic:  Negative      Psychiatric:  Negative      Heme/Lymph/Imm:  Negative      Endocrine:  Negative        Physical Exam:  Vitals: /72   Pulse 64   Ht 1.702 m (5' 7\")   Wt 72.3 kg (159 lb 4.8 oz)   BMI 24.95 kg/m       Constitutional:  cooperative thin      Skin:  warm and dry to the touch          Head:  normocephalic        Eyes:  pupils equal and round        Lymph:      ENT:  no pallor or cyanosis;dentition good        Neck:  JVP normal        Respiratory:            Cardiac: regular rhythm;normal S1 and S2                pulses full and equal                                        GI:      thin    Extremities and Muscular Skeletal:  no edema              Neurological:  no gross motor deficits        Psych:  Alert and Oriented x 3    Encounter Diagnoses   Name Primary?     ASD (atrial septal defect)      ST elevation myocardial infarction (STEMI), unspecified artery (H)      Ischemic cardiomyopathy        Recent Lab Results:  LIPID RESULTS:  Lab Results   Component Value Date    CHOL 178 02/26/2019    HDL 58 02/26/2019     (H) 02/26/2019    TRIG 52 02/26/2019       LIVER ENZYME RESULTS:  Lab Results   Component Value Date    AST 11 01/16/2020    ALT 18 01/16/2020       CBC RESULTS:  Lab Results   Component Value Date    WBC 7.0 01/16/2020    RBC 5.65 01/16/2020    HGB 13.4 01/16/2020    HCT 41.8 01/16/2020    MCV 74 (L) 01/16/2020    MCH 23.7 (L) 01/16/2020    MCHC 32.1 01/16/2020    RDW 15.2 (H) 01/16/2020     01/16/2020       BMP RESULTS:  Lab Results   Component Value Date     01/16/2020    POTASSIUM 4.1 01/16/2020    CHLORIDE 107 01/16/2020    CO2 26 01/16/2020    ANIONGAP 5 01/16/2020     (H) 01/16/2020    BUN 18 01/16/2020    CR 0.90 01/16/2020    GFRESTIMATED >90 01/16/2020    GFRESTBLACK >90 01/16/2020    ROBINSON 9.3 01/16/2020        A1C RESULTS:  Lab Results   Component Value Date    A1C 5.9 (H) 02/27/2019       INR RESULTS:  Lab Results   Component " Value Date    INR 1.07 02/28/2019    INR 1.01 02/26/2019           CC  [facility]  [address] [facility] [address]                Thank you for allowing me to participate in the care of your patient.    Sincerely,     Tomas Eugene MD     [facility]

## 2020-01-16 NOTE — PROGRESS NOTES
HPI and Plan:   I had the pleasure of seeing Ross Smith today in a shared visit with Dr. Eugene in adult congential cardiology clinic follow up. He is a pleasant 49 year old patient of Dr. Eugene with a history of ASD.    Mr. Smith had planned hip replacement in February 2019, he developed chest pain coming out of anesthesia and its troponins peaked to the 119.  In the Cath Lab he had an embolic occlusion of the OM which was aspirated but not stented.  He was put on dual antiplatelet therapy.  His echo showed a EF of 40 to 45% and his bubble study was positive with a large amount of contrast crossing the septum.  He has a secundum atrial septal defect with eustachian valve problem.  Cardiac MRI on April 8, 2019 showed normal RV size and function with an LV that was mildly reduced at 46% and some hypokinesis of the mid inferior lateral wall consistent with circumflex infarct.  The left ear G measured 11 mm,the calculated Qp/Qs of 1.3:1.  He was noted to be a little bit anemic following the surgery, he took iron supplements and his hemoglobin has improved from 9.2 up to 13.4.  He has no family history of congenital heart disease or early coronary disease.    Today he is doing well.  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  He does wonder if he can come off of any of his medications and he wonders if he has to have hip surgery again, which anticipates he may need the other hip replaced, what we can do to help reduce his risk for repeat embolic event.  We reviewed the results of his echocardiogram today, LV is still reduced at 46%.      Labs Reviewed: Sodium 138, testing 4.1, creatinine 0.90, hemoglobin 13.4,     Physical Exam  Please see Below     Assessment and Plan  1.  Atrial septal defect.  This is small with normal RV size.  We will continue to follow this closely with a echo again in a year.  If he is having surgery, I have asked him to call our office  so we can discuss anticoagulation.  He should have filters on IVs.  2.  Myocardial infarction following surgery.  He had an embolic occlusion of the OM branch which was aspirated, not stented.  He was placed on dual antiplatelet therapy, he can stop Plavix on the anniversary of his event, February 26.  He should continue on lifelong aspirin, statin.  3.  Ischemic cardiomyopathy.  He has been on good medical therapy.  His EF is still reduced at 46%.  He should continue beta-blocker and ARB therapy.  4.  Hypertension.  Controlled on amlodipine, Toprol and losartan hydrochlorothiazide.    Thank you for allowing me to care for Ross Smith today.    XENIA Wiley, CNP  Cardiology    This is a shared visit between myself and XENIA Mcgarry. I was present for the majority of the visit. This patient was seen and examined by me and I reviewed all testing. We discussed the patient and plan of care including medication changes. I agree with the plan outlined above.  Discussed that Plavix can be stopped February 26th and that should he have any additional surgeries on his knees/hips that I would recommend anticoagulation after the surgery given his history.  We could consider closing the hole, but he has been hesitant to do that in the past. We will continue to follow him regularly.   Dr. Tomas Eugene MD      Orders Placed This Encounter   Procedures     Adult Congenital and CV Genetics Clinic     Echo Congenital Adult     Orders Placed This Encounter   Medications     DISCONTD: losartan (COZAAR) 100 MG tablet     Sig: Take 100 mg by mouth daily     DISCONTD: hydrochlorothiazide (HYDRODIURIL) 25 MG tablet     Sig: Take 25 mg by mouth daily     losartan-hydrochlorothiazide (HYZAAR) 100-25 MG tablet     Sig: Take 1 tablet by mouth daily     Dispense:  30 tablet     Refill:  11     Medications Discontinued During This Encounter   Medication Reason     oxyCODONE (ROXICODONE) 5 MG tablet Therapy completed      losartan-hydrochlorothiazide (HYZAAR) 100-25 MG tablet Dose adjustment     hydrochlorothiazide (HYDRODIURIL) 25 MG tablet      losartan (COZAAR) 100 MG tablet          CURRENT MEDICATIONS:  Current Outpatient Medications   Medication Sig Dispense Refill     amLODIPine (NORVASC) 10 MG tablet Take 10 mg by mouth daily       aspirin (ASA) 81 MG EC tablet Take 1 tablet (81 mg) by mouth daily 90 tablet 3     atorvastatin (LIPITOR) 40 MG tablet Take 1 tablet (40 mg) by mouth every evening 30 tablet 6     clopidogrel (PLAVIX) 75 MG tablet Take 1 tablet (75 mg) by mouth daily 30 tablet 6     ferrous fumarate 65 mg, [place]. FE,-Vitamin C 125 mg (VITRON-C)  MG TABS tablet Take 1 tablet by mouth daily 90 tablet 3     losartan-hydrochlorothiazide (HYZAAR) 100-25 MG tablet Take 1 tablet by mouth daily 30 tablet 11     methocarbamol (ROBAXIN) 500 MG tablet Take 1 tablet (500 mg) by mouth 4 times daily as needed for muscle spasms 30 tablet 0     metoprolol succinate ER (TOPROL-XL) 50 MG 24 hr tablet Take 1 tablet (50 mg) by mouth daily 30 tablet 10     nitroGLYcerin (NITROSTAT) 0.4 MG sublingual tablet For chest pain place 1 tablet under the tongue every 5 minutes for 3 doses. If symptoms persist 5 minutes after 1st dose call 911. 20 tablet 0     hydrOXYzine (ATARAX) 10 MG tablet Take 1 tablet (10 mg) by mouth 3 times daily as needed for anxiety or other (pain) (Patient not taking: Reported on 1/16/2020) 30 tablet 0       ALLERGIES   No Known Allergies    PAST MEDICAL HISTORY:  Past Medical History:   Diagnosis Date     Arthritis      ASCVD (arteriosclerotic cardiovascular disease)      ETOH abuse      HLD (hyperlipidemia)      Hypertension      STEMI (ST elevation myocardial infarction) (H)        PAST SURGICAL HISTORY:  Past Surgical History:   Procedure Laterality Date     CV HEART CATHETERIZATION WITH POSSIBLE INTERVENTION N/A 2/26/2019    thrombectomy and angioplasty to OM 2     TRANSESOPHAGEAL ECHOCARDIOGRAM  "INTRAOPERATIVE N/A 3/27/2019    Procedure: TRANSESOPHAGEAL ECHOCARDIOGRAM INTRAOPERATIVE (GENERAL ANESTHESIA);  Surgeon: GENERIC ANESTHESIA PROVIDER;  Location: [facility] OR       FAMILY HISTORY:  History reviewed. No pertinent family history.    SOCIAL HISTORY:  Social History     Socioeconomic History     Marital status:      Spouse name: None     Number of children: None     Years of education: None     Highest education level: None   Occupational History     None   Social Needs     Financial resource strain: None     Food insecurity:     Worry: None     Inability: None     Transportation needs:     Medical: None     Non-medical: None   Tobacco Use     Smoking status: Never Smoker     Smokeless tobacco: Never Used   Substance and Sexual Activity     Alcohol use: No     Frequency: Never     Drug use: No     Sexual activity: Never   Lifestyle     Physical activity:     Days per week: None     Minutes per session: None     Stress: None   Relationships     Social connections:     Talks on phone: None     Gets together: None     Attends Religion service: None     Active member of club or organization: None     Attends meetings of clubs or organizations: None     Relationship status: None     Intimate partner violence:     Fear of current or ex partner: None     Emotionally abused: None     Physically abused: None     Forced sexual activity: None   Other Topics Concern     None   Social History Narrative     None       Review of Systems:  Skin:  Negative       Eyes:  Positive for glasses    ENT:  Negative      Respiratory:  Negative       Cardiovascular:    palpitations;Positive for occ  Gastroenterology: Negative      Genitourinary:  Negative      Musculoskeletal:  Negative      Neurologic:  Negative      Psychiatric:  Negative      Heme/Lymph/Imm:  Negative      Endocrine:  Negative        Physical Exam:  Vitals: /72   Pulse 64   Ht 1.702 m (5' 7\")   Wt 72.3 kg (159 lb 4.8 oz)   BMI 24.95 kg/m  "     Constitutional:  cooperative thin      Skin:  warm and dry to the touch          Head:  normocephalic        Eyes:  pupils equal and round        Lymph:      ENT:  no pallor or cyanosis;dentition good        Neck:  JVP normal        Respiratory:            Cardiac: regular rhythm;normal S1 and S2                pulses full and equal                                        GI:      thin    Extremities and Muscular Skeletal:  no edema              Neurological:  no gross motor deficits        Psych:  Alert and Oriented x 3    Encounter Diagnoses   Name Primary?     ASD (atrial septal defect)      ST elevation myocardial infarction (STEMI), unspecified artery (H)      Ischemic cardiomyopathy        Recent Lab Results:  LIPID RESULTS:  Lab Results   Component Value Date    CHOL 178 02/26/2019    HDL 58 02/26/2019     (H) 02/26/2019    TRIG 52 02/26/2019       LIVER ENZYME RESULTS:  Lab Results   Component Value Date    AST 11 01/16/2020    ALT 18 01/16/2020       CBC RESULTS:  Lab Results   Component Value Date    WBC 7.0 01/16/2020    RBC 5.65 01/16/2020    HGB 13.4 01/16/2020    HCT 41.8 01/16/2020    MCV 74 (L) 01/16/2020    MCH 23.7 (L) 01/16/2020    MCHC 32.1 01/16/2020    RDW 15.2 (H) 01/16/2020     01/16/2020       BMP RESULTS:  Lab Results   Component Value Date     01/16/2020    POTASSIUM 4.1 01/16/2020    CHLORIDE 107 01/16/2020    CO2 26 01/16/2020    ANIONGAP 5 01/16/2020     (H) 01/16/2020    BUN 18 01/16/2020    CR 0.90 01/16/2020    GFRESTIMATED >90 01/16/2020    GFRESTBLACK >90 01/16/2020    ROBINSON 9.3 01/16/2020        A1C RESULTS:  Lab Results   Component Value Date    A1C 5.9 (H) 02/27/2019       INR RESULTS:  Lab Results   Component Value Date    INR 1.07 02/28/2019    INR 1.01 02/26/2019           CC  [facility]  [address] [place] [address]

## 2020-01-16 NOTE — NURSING NOTE
Cardiac Testing: Patient given instructions regarding  echocardiogram . Discussed purpose, preparation, procedure and when to expect results reported back to the patient. Patient demonstrated understanding of this information and agreed to call with further questions or concerns.  Diet: Patient instructed regarding a heart healthy diet, including discussion of reduced fat and sodium intake. Patient demonstrated understanding of this information and agreed to call with further questions or concerns.  Med Reconcile: Reviewed and verified all current medications with the patient. The updated medication list was printed and given to the patient.  Return Appointment: Patient given instructions regarding scheduling next clinic visit. Patient demonstrated understanding of this information and agreed to call with further questions or concerns.  Medication Change: Stop Plavix on February 26, 2020. Patient was educated regarding prescribed medication change, including discussion of the indication, administration, side effects, and when to report to MD or RN. Patient demonstrated understanding of this information and agreed to call with further questions or concerns.  Patient stated he understood all health information given and agreed to call with further questions or concerns.

## 2020-01-16 NOTE — PATIENT INSTRUCTIONS
You were seen today in the Adult Congenital and Cardiovascular Genetics Clinic at the [facility].    Cardiology Providers you saw during your visit:  KLEBER Eugene MD    Diagnosis:  Secundum ASD    Results:  KLEBER Eugene MD reviewed the results of your echocardiogram testing today in clinic.    Recommendations:    1. Continue to eat a heart healthy, low salt diet.  2. Continue to get 20-30 minutes of aerobic activity, 4-5 days per week.  Examples of aerobic activity include walking, running, swimming, cycling, etc.  3. Continue to observe good oral hygiene, with regular dental visits.  4. Stop Plavix on February 26 , 2020.      SBE prophylaxis:   Yes____  No__X__    Lifelong Bacterial Endocarditis Prophylaxis:  YES____  NO____    If YES is checked, follow the recommendations outlined below:  1. Take antibiotic(s) prior to recommended dental procedures and procedures on the respiratory tract or with infected skin, muscle or bones. SBE prophylaxis is not needed for routine GI and  procedures (ie. Colonoscopy or vaginal delivery)  2. Observe good oral hygiene daily, as advised by your dentist. Get regular professional dental care.  3. Keep cuts clean.  4. Infections should be treated promptly.  5. Symptoms of Infective Endocarditis could include: fever lasting more than 4-5 days or a recurrent fever that initially resolves but returns within 1-2 days)      Exercise restrictions:   Yes__X__  No____         If yes, list restrictions:  Must be allowed to rest if fatigued or SOB      Work restrictions:  Yes____  No_X___         If yes, list restrictions:    FASTING CHOLESTEROL was checked in the last 5 years YES_X__  NO___ (2019)  Continue to eat a heart healthy, low salt diet.         ____ Fasting lipid panel order today         ____ No changes in medications          ____ I recommend the following changes in your cholesterol medications.:          ____ Please follow up for cholesterol screening at  your primary care physician      Follow-up:  Follow up with Dr. Eugene in one year with an echocardiogram prior.    If you have questions or concerns please contact us at:    Jyoti Sal, MSN, RN, CNL    Jenniffer Ceballos (Scheduling)  Nurse Care Coordinator     Clinic   Adult Congenital and CV Genetics   Adult Congenital and CV Genetic  [facility]   [facility]  (P) [phone]     (P) [phone]  [email or web address]   (F) [phone]        For after hours urgent needs, call [phone] and ask to speak to the Adult Congenital Physician on call.  Mention Job Code 0401.    For emergencies call [phone].    [facility]   Clinics and Surgery Center  Mail Code [id number]  [address] [id number]

## 2020-06-04 ENCOUNTER — CARE COORDINATION (OUTPATIENT)
Dept: CARDIOLOGY | Facility: CLINIC | Age: 50
End: 2020-06-04

## 2020-06-04 DIAGNOSIS — Q21.10 ASD (ATRIAL SEPTAL DEFECT): ICD-10-CM

## 2020-06-04 DIAGNOSIS — Q21.11 OSTIUM SECUNDUM TYPE ATRIAL SEPTAL DEFECT: ICD-10-CM

## 2020-06-04 DIAGNOSIS — E78.5 HYPERLIPIDEMIA, UNSPECIFIED HYPERLIPIDEMIA TYPE: Primary | ICD-10-CM

## 2020-06-04 DIAGNOSIS — Q21.12 PFO (PATENT FORAMEN OVALE): ICD-10-CM

## 2020-06-04 RX ORDER — AMLODIPINE BESYLATE 10 MG/1
10 TABLET ORAL DAILY
Qty: 30 TABLET | Refills: 11 | Status: SHIPPED | OUTPATIENT
Start: 2020-06-04

## 2020-06-04 NOTE — PROGRESS NOTES
Called patient since we received refill request for Plavix. Patient didn't know if he was to stop Plavix or not so continued to take. Reviewed Dr. Eugene's note from January 2020 to stop Plavix on anniversary of event. Patient asked for amlodipine refill as well. Prescription refilled. Patient verbalized understanding and is in agreement to the plan.

## 2022-10-31 NOTE — PROGRESS NOTES
[facility]    Cardiology Progress Note    Date of Service: 03/01/2019     Assessment & Plan   Ross Smith is a 48 year old male with past medical history of HTN, ETOH abuse, arthiritis.  This patient was admitted on 2/26/2019 with symptoms of inferior MI with left his arthroplasty.      1. Inferior ST elevation myocardial infarction   Mild CM EF 40-45% with lateral, inferolateral, inferior AK  Aspiration thrombectomy to OM-2 for thrombotic occlusion  No stent placed.  Cardiac MRI yesterday revealed EF of 51% with poor viability in the circumflex territory infarct  Evidence of microvascular obstruction consistent with recent infarct  No cardiac thrombus noted  No other significant disease  Peak troponin 119, decreased to 90.  No chest pain  On aspirin and Plavix  Heart rate slightly higher partly from pain.  Suggest increasing beta-blockers as tolerated.    2.  Possible embolic infarct  Appreciate vascular consult.  The doing hypercoagulable workup and so far negative.  Echo will study was performed per their request and it shows a patent foramen ovale with right-to-left shunt on bubble study.  I have discussed the presence of PFO with the patient and her relatives today.  We talked about 20-25% recurrence of PFO in all population in the association of PFO with cryptogenic embolic phenomenon including stroke.  His lower extremity  Dopplers are negative for DVT.  Given that he has had a drop in hemoglobin with heparin and is already on dual antiplatelet therapy due to recent myocardial infarction, and there is no evidence of hypercoagulable state as of now, vascular is recommending holding of anticoagulation which is reasonable.  I discussed that with the patient and he understands it.  We will ask him to be seen in the structural heart clinic for discussion of PFO closure and appointment was made with Dr. Almanza.    3.  Left hip arthroplasty, pain under better control.  Continue pain medications per  orthopedic and hospitalist.    4.  Anemia likely from blood loss, improved to 9.2.  Did receive blood transfusions this admission.    5. HTN controlled    6.  HDL 58 TG 52  Atorvastatin 40mg daily      We will arrange follow-up in the cardiology clinic with the mid-level provider and his primary cardiologist Dr. Henry.  He will also be seen in the structural heart clinic.  He will need outpatient cardiac rehab and that should be recommended and ordered.  I would also recommend sublingual nitroglycerin at discharge and have given him instructions how to use it.  He will see him as needed.  Please recall with questions.    Interval History   No chest pain.  Hip pain better controlled.    Review of Systems:  The Review of Systems is negative other than noted in the HPI    Physical Exam   Temp: 98.9  F (37.2  C) Temp src: Oral BP: 118/82(post-C) Pulse: 107 Heart Rate: 110 Resp: 16 SpO2: 98 % O2 Device: None (Room air) Oxygen Delivery: 2 LPM  Vitals:    02/27/19 0250 02/28/19 0500 03/01/19 0621   Weight: 73.8 kg (162 lb 11.2 oz) 75.2 kg (165 lb 12.8 oz) 75.5 kg (166 lb 7.2 oz)     Vital Signs with Ranges  Temp:  [98.5  F (36.9  C)-100.7  F (38.2  C)] 98.9  F (37.2  C)  Pulse:  [] 107  Heart Rate:  [] 110  Resp:  [16-18] 16  BP: (118-133)/(62-90) 118/82  SpO2:  [96 %-100 %] 98 %  I/O last 3 completed shifts:  In: 900 [P.O.:900]  Out: 1150 [Urine:1150]    Constitutional     alert and oriented, in no acute distress.     Skin     warm and dry to touch    ENT     no pallor or cyanosis    Neck    Supple, JVP normal, no carotid bruit    Chest     no tenderness to palpation     Lungs  clear to auscultation     Cardiac  regular rhythm, S1 normal, S2 normal, No S3 +S4, no murmurs, no rubs    Abdomen     abdomen soft, bowel sounds normoactive, no hepatosplenomegaly    Extremities and Back     no clubbing, cyanosis. No edema observed.        Neurological     no gross motor deficits noted, affect appropriate,  oriented to time, person and place.        Medications     Continuing ACE inhibitor/ARB/ARNI from home medication list OR ACE inhibitor/ARB order already placed during this visit       - MEDICATION INSTRUCTIONS -         acetaminophen  1,000 mg Oral Q8H     amLODIPine  10 mg Oral Daily     aspirin  81 mg Oral Daily     atorvastatin  40 mg Oral QPM     clopidogrel  75 mg Oral Daily     losartan-hydrochlorothiazide  1 tablet Oral Daily     metoprolol succinate ER  50 mg Oral Daily          Data:     ROUTINE IP LABS (Last four results)  BMP  Recent Labs   Lab 02/28/19  0523 02/27/19  0533 02/26/19  1302    137 137   POTASSIUM 4.0 3.9 4.3   CHLORIDE 108 107 103   ROBINSON 7.9* 8.3* 9.2   CO2 29 24 26   BUN 9 12 10   CR 0.83 0.86 1.01   * 125* 118*     CHOLESTEROL/HEPATIC  Recent Labs   Lab 02/26/19  1436   CHOL 178   TRIG 52   *   HDL 58     CBC  Recent Labs   Lab 03/01/19  0701 02/28/19  0523  02/27/19  0533  02/26/19  1436 02/26/19  1302   WBC  --  8.3  --  11.1*  --  18.6* 17.4*   RBC  --  3.42*  --  3.94*  --  5.41 5.52   HGB 9.2* 8.3*   < > 9.4*   < > 13.0* 13.2*   HCT  --  25.7*  --  28.4*  --  39.7* 40.7   MCV  --  75*  --  72*  --  73* 74*   MCH  --  24.3*  --  23.9*  --  24.0* 23.9*   MCHC  --  32.3  --  33.1  --  32.7 32.4   RDW  --  15.6*  --  15.1*  --  15.4* 15.4*   PLT  --  178  --  231  --  271 267    < > = values in this interval not displayed.     TROP:   Recent Labs   Lab 02/27/19  0533 02/27/19  0215 02/26/19  2140 02/26/19  1302   TROPI 90.909* 112.935* 119.975* 0.032      BNP:  No results for input(s): NTBNPI in the last 168 hours.  INR  Recent Labs   Lab 02/28/19  1528 02/26/19  1302   INR 1.07 1.01     No results found for: TSH    EKG results:  Reviewed if available     Imaging:  Recent Results (from the past 24 hour(s))   US Lower Extremity Venous Duplex Bilateral    Narrative    US LOWER EXTREMITY VENOUS DUPLEX BILATERAL 3/1/2019 10:27 AM    CLINICAL HISTORY/INDICATION: Large PFO,  recent MI with thromboemboli  in coronary arteries. Rule out DVT    COMPARISON: None relevant    TECHNIQUE:   Grayscale, color-flow, and spectral waveform analysis were performed  of the deep veins of the bilateral lower extremities    FINDINGS:   The right common femoral vein, femoral vein, popliteal vein and tibial  veins demonstrate normal compressibility, spectral waveform, color  flow and augmentation.    The left common femoral vein, femoral vein, popliteal vein and tibial  veins demonstrate normal compressibility, spectral waveform, color  flow and augmentation.      Impression    IMPRESSION:   1. No evidence of deep venous thrombosis in the right lower extremity.  2. No evidence of deep venous thrombosis in the left lower extremity.    AURORA HINDS,      Telemetry:  sinus                       (0) indicator not present

## 2023-12-15 ENCOUNTER — OFFICE VISIT (OUTPATIENT)
Dept: FAMILY MEDICINE | Facility: CLINIC | Age: 53
End: 2023-12-15
Payer: COMMERCIAL

## 2023-12-15 VITALS
OXYGEN SATURATION: 99 % | HEIGHT: 68 IN | DIASTOLIC BLOOD PRESSURE: 82 MMHG | HEART RATE: 66 BPM | SYSTOLIC BLOOD PRESSURE: 129 MMHG | RESPIRATION RATE: 18 BRPM | BODY MASS INDEX: 23.95 KG/M2 | TEMPERATURE: 97.6 F | WEIGHT: 158 LBS

## 2023-12-15 DIAGNOSIS — R79.89 ELEVATED SERUM CREATININE: ICD-10-CM

## 2023-12-15 DIAGNOSIS — I21.3 ST ELEVATION MYOCARDIAL INFARCTION (STEMI), UNSPECIFIED ARTERY (H): ICD-10-CM

## 2023-12-15 DIAGNOSIS — Q21.12 PFO (PATENT FORAMEN OVALE): ICD-10-CM

## 2023-12-15 DIAGNOSIS — F17.290 CIGAR SMOKER: ICD-10-CM

## 2023-12-15 DIAGNOSIS — I10 HTN, GOAL BELOW 140/90: ICD-10-CM

## 2023-12-15 DIAGNOSIS — Z12.11 SCREEN FOR COLON CANCER: ICD-10-CM

## 2023-12-15 DIAGNOSIS — Z00.00 ROUTINE HISTORY AND PHYSICAL EXAMINATION OF ADULT: Primary | ICD-10-CM

## 2023-12-15 DIAGNOSIS — Z11.59 NEED FOR HEPATITIS C SCREENING TEST: ICD-10-CM

## 2023-12-15 DIAGNOSIS — I25.10 ASCVD (ARTERIOSCLEROTIC CARDIOVASCULAR DISEASE): ICD-10-CM

## 2023-12-15 DIAGNOSIS — E78.5 HYPERLIPIDEMIA LDL GOAL <100: ICD-10-CM

## 2023-12-15 DIAGNOSIS — Z12.5 SCREENING FOR PROSTATE CANCER: ICD-10-CM

## 2023-12-15 LAB
ALBUMIN SERPL BCG-MCNC: 4.5 G/DL (ref 3.5–5.2)
ALP SERPL-CCNC: 55 U/L (ref 40–150)
ALT SERPL W P-5'-P-CCNC: 13 U/L (ref 0–70)
ANION GAP SERPL CALCULATED.3IONS-SCNC: 13 MMOL/L (ref 7–15)
AST SERPL W P-5'-P-CCNC: 23 U/L (ref 0–45)
BASOPHILS # BLD AUTO: 0 10E3/UL (ref 0–0.2)
BASOPHILS NFR BLD AUTO: 0 %
BILIRUB SERPL-MCNC: 0.3 MG/DL
BUN SERPL-MCNC: 25.7 MG/DL (ref 6–20)
CALCIUM SERPL-MCNC: 9.3 MG/DL (ref 8.6–10)
CHLORIDE SERPL-SCNC: 104 MMOL/L (ref 98–107)
CHOLEST SERPL-MCNC: 165 MG/DL
CREAT SERPL-MCNC: 1.18 MG/DL (ref 0.67–1.17)
DEPRECATED HCO3 PLAS-SCNC: 22 MMOL/L (ref 22–29)
EGFRCR SERPLBLD CKD-EPI 2021: 74 ML/MIN/1.73M2
EOSINOPHIL # BLD AUTO: 0.1 10E3/UL (ref 0–0.7)
EOSINOPHIL NFR BLD AUTO: 2 %
ERYTHROCYTE [DISTWIDTH] IN BLOOD BY AUTOMATED COUNT: 18.6 % (ref 10–15)
FASTING STATUS PATIENT QL REPORTED: NO
GLUCOSE SERPL-MCNC: 89 MG/DL (ref 70–99)
HCT VFR BLD AUTO: 40 % (ref 40–53)
HDLC SERPL-MCNC: 53 MG/DL
HGB BLD-MCNC: 12.3 G/DL (ref 13.3–17.7)
IMM GRANULOCYTES # BLD: 0 10E3/UL
IMM GRANULOCYTES NFR BLD: 0 %
LDLC SERPL CALC-MCNC: 92 MG/DL
LYMPHOCYTES # BLD AUTO: 1.9 10E3/UL (ref 0.8–5.3)
LYMPHOCYTES NFR BLD AUTO: 28 %
MCH RBC QN AUTO: 22.4 PG (ref 26.5–33)
MCHC RBC AUTO-ENTMCNC: 30.8 G/DL (ref 31.5–36.5)
MCV RBC AUTO: 73 FL (ref 78–100)
MONOCYTES # BLD AUTO: 0.7 10E3/UL (ref 0–1.3)
MONOCYTES NFR BLD AUTO: 10 %
NEUTROPHILS # BLD AUTO: 4 10E3/UL (ref 1.6–8.3)
NEUTROPHILS NFR BLD AUTO: 60 %
NONHDLC SERPL-MCNC: 112 MG/DL
PLATELET # BLD AUTO: 259 10E3/UL (ref 150–450)
POTASSIUM SERPL-SCNC: 4.1 MMOL/L (ref 3.4–5.3)
PROT SERPL-MCNC: 7.4 G/DL (ref 6.4–8.3)
PSA SERPL DL<=0.01 NG/ML-MCNC: 0.62 NG/ML (ref 0–3.5)
RBC # BLD AUTO: 5.5 10E6/UL (ref 4.4–5.9)
SODIUM SERPL-SCNC: 139 MMOL/L (ref 135–145)
TRIGL SERPL-MCNC: 102 MG/DL
WBC # BLD AUTO: 6.7 10E3/UL (ref 4–11)

## 2023-12-15 PROCEDURE — 86803 HEPATITIS C AB TEST: CPT | Performed by: FAMILY MEDICINE

## 2023-12-15 PROCEDURE — G0103 PSA SCREENING: HCPCS | Performed by: FAMILY MEDICINE

## 2023-12-15 PROCEDURE — 80053 COMPREHEN METABOLIC PANEL: CPT | Performed by: FAMILY MEDICINE

## 2023-12-15 PROCEDURE — 99386 PREV VISIT NEW AGE 40-64: CPT | Performed by: FAMILY MEDICINE

## 2023-12-15 PROCEDURE — 80061 LIPID PANEL: CPT | Performed by: FAMILY MEDICINE

## 2023-12-15 PROCEDURE — 36415 COLL VENOUS BLD VENIPUNCTURE: CPT | Performed by: FAMILY MEDICINE

## 2023-12-15 PROCEDURE — 85025 COMPLETE CBC W/AUTO DIFF WBC: CPT | Performed by: FAMILY MEDICINE

## 2023-12-15 RX ORDER — CARVEDILOL 25 MG/1
25 TABLET ORAL 2 TIMES DAILY WITH MEALS
COMMUNITY
Start: 2023-12-15

## 2023-12-15 RX ORDER — FUROSEMIDE 40 MG
40 TABLET ORAL DAILY
COMMUNITY
Start: 2023-12-15

## 2023-12-15 ASSESSMENT — ENCOUNTER SYMPTOMS
ARTHRALGIAS: 0
DYSURIA: 0
FEVER: 0
SORE THROAT: 0
HEMATOCHEZIA: 0
FREQUENCY: 1
HEMATURIA: 0
PALPITATIONS: 0
NAUSEA: 0
PARESTHESIAS: 0
HEADACHES: 0
WEAKNESS: 0
CHILLS: 0
EYE PAIN: 0
NERVOUS/ANXIOUS: 0
CONSTIPATION: 0
HEARTBURN: 0
MYALGIAS: 0
ABDOMINAL PAIN: 0
DIZZINESS: 0
DIARRHEA: 0
SHORTNESS OF BREATH: 1
COUGH: 0
JOINT SWELLING: 0

## 2023-12-15 ASSESSMENT — PATIENT HEALTH QUESTIONNAIRE - PHQ9
10. IF YOU CHECKED OFF ANY PROBLEMS, HOW DIFFICULT HAVE THESE PROBLEMS MADE IT FOR YOU TO DO YOUR WORK, TAKE CARE OF THINGS AT HOME, OR GET ALONG WITH OTHER PEOPLE: NOT DIFFICULT AT ALL
SUM OF ALL RESPONSES TO PHQ QUESTIONS 1-9: 4
SUM OF ALL RESPONSES TO PHQ QUESTIONS 1-9: 4

## 2023-12-15 NOTE — COMMUNITY RESOURCES LIST (ENGLISH)
12/15/2023   [facility] - Outpatient Clinics  N/A  For additional resource needs, please contact your health insurance member services or your primary care team.  Phone: [phone]   Email: N/A   Address: [address] [place] [address]   Hours: N/A        Financial Stability       Rent and mortgage payment assistance  1  Neighborhood Assistance [employer] ([employer]) - Home Save Program Distance: 2.19 miles      Phone/Virtual   [address] [place] [id number]  Language: English, Chadian  Hours: Mon - Fri 9:00 AM - 5:00 PM  Fees: Free   Phone: (132) 289-5190 Email: services@[employer] Website: https://www.Anjuke     2  [facility] - Rent Assistance Distance: 2.54 miles      In-Person   [address] [place] [address]  Language: English, Chadian  Hours: Mon - Sat 8:00 AM - 12:00 PM , Mon - Tue 1:00 PM - 8:00 PM , Wed 1:00 PM - 4:00 PM , Thu - Fri 1:00 PM - 8:00 PM , Sat 12:30 PM - 4:00 PM  Fees: Free   Phone: (523) 604-1523 Email: Nuno@[facility].org Website: https://[facility].org/[facility]/home/#whatwedo          Food and Nutrition       Food pantry  3  [facility] Distance: 0.6 miles      [facility] [place] [address]  Language: English, Chadian  Hours: Wed 6:30 PM - 7:00 PM  Fees: Free   Phone: (506) 396-6341 Email: [email or web address] Website: http://[facility].org/     4  [employer] and [employer] Distance: 1.2 miles      [address] [address]  Language: English, Chadian  Hours: Mon - Tue 8:00 AM - 4:30 PM , Wed 10:00 AM - 6:30 PM , Thu 8:00 AM - 4:30 PM  Fees: Free   Phone: (343) 112-3639 Email: maylin@[employer] Website: http://www.sacEdusoftshelf.org/     SNAP application assistance  5  [employer] [place] Distance: 10.27 miles      Phone/[address]  [address]  Language: English, Hmong, Serbian, Lao, Yakut  Hours: Mon - Fri 8:30 AM - 4:30 PM  Fees: Free   Phone: (594) 876-6995 Email: helpline@[employer]oDerby[employer].org Website: https://www.hungersolutions.org/programs/[place]-food-helpline/     6  [facility] (AIFW) Distance: 2.45 miles      In-Person, Phone/Virtual   [address] [place] [address] [place] [address]  Language: Telugu, Sami, English, Gujarati, Uriel, Slovak, Romanian, Welsh, Sinhala, Romanian  Hours: Mon - Wed 9:00 AM - 5:00 PM , Thu 12:00 PM - 6:00 PM , Fri 9:00 AM - 5:00 PM , Sun 10:30 AM - 2:00 PM Appt. Only  Fees: Free   Phone: (293) 165-8897 Email: info@[facility] InstituteR[facility]w.org Website: https://www.[facility] InstituteRocketOzM[facility] Southw.org/     Soup kitchen or free meals  7  [facility] Dinner Distance: 1.12 miles      Pickup   [address] [place] [phone]  Language: English  Hours: Tue 5:00 PM - 6:30 PM  Fees: Free   Phone: (279) 401-4256 Email: admin@[facility] CenterRocketOzSelect [facility].MD.Voice Website: http://www.[facility] Medical CenterRocketOzSelect Medical [facility].org/     8  [facility] Meal Distance: 1.34 miles      Pick   [address] [phone]  Language: English  Hours: Mon 5:00 PM - 5:45 PM  Fees: Free   Phone: (277) 283-2481 Email: office@[employer].org Website: http://www.[employer].org          Important Numbers & Websites       [address].org  Poison Control   (568) 525-2944 [place]poison.[place]  Suicide and Crisis Lifeline   [address] [place], [place] [address]line.org  Childhelp [place] Child Abuse Hotline   242.148.1668 Childhelphotline.org  National Sexual Assault Hotline   (412) 871-3739 (HOPE) Rainn.org  National Runaway Safeline   (803) 836-6531 (RUNAWAY) [employer].org  Pregnancy & Postpartum Support [place]   Call/text 121-145-6505 Ppsupportmn.org  Substance Abuse National Helpline ([facility]   218-619-HELP (5322) Findtreatment.gov  Emergency Services   915

## 2023-12-15 NOTE — COMMUNITY RESOURCES LIST (ENGLISH)
12/15/2023   [facility] - Outpatient Clinics  N/A  For additional resource needs, please contact your health insurance member services or your primary care team.  Phone: [phone]   Email: N/A   Address: [address] [place] [address]   Hours: N/A        Financial Stability       Rent and mortgage payment assistance  1  Neighborhood Assistance [employer] [place] ([employer]) - Home Save Program Distance: 2.19 miles      Phone/Virtual   [address] [place] [phone]  Language: English, Saudi Arabian  Hours: Mon - Fri 9:00 AM - 5:00 PM  Fees: Free   Phone: (357) 704-3264 Email: services@[employer] Website: https://www.V I O     2  [facility] - Rent Assistance Distance: 2.54 miles      In-Person   [address] [place] [address]  Language: English, [place]  Hours: Mon - Sat 8:00 AM - 12:00 PM , Mon - Tue 1:00 PM - 8:00 PM , Wed 1:00 PM - 4:00 PM , Thu - Fri 1:00 PM - 8:00 PM , Sat 12:30 PM - 4:00 PM  Fees: Free   Phone: (226) 150-3230 Email: Nuno@[facility].org Website: https://[facility].org/[facility]/home/#whatwedo          Food and Nutrition       Food pantry  3  [facility] Distance: 0.6 miles      [facility] [place] [address]  Language: English, [place] Arabian  Hours: Wed 6:30 PM - 7:00 PM  Fees: Free   Phone: (762) 925-6483 Email: ohbchurch@[employer] Website: http://[facility].org/     4  [employer] and [employer] Distance: 1.2 miles      [address] [address]  Language: English, Saudi Arabian  Hours: Mon - Tue 8:00 AM - 4:30 PM , Wed 10:00 AM - 6:30 PM , Thu 8:00 AM - 4:30 PM  Fees: Free   Phone: (984) 341-7816 Email: maylin@[facility] Website: http://www.sacElement Designsshelf.org/     SNAP application assistance  5  [employer] [place] Distance: 10.27 miles      Phone/[address]  [address]  Language: English, Hmong, Argentine, Monegasque, Occitan  Hours: Mon - Fri 8:30 AM - 4:30 PM  Fees: Free   Phone: (393) 694-1002 Email: helpline@hungersoViVex [employer]ical.org Website: https://www.hungersolutions.org/programs/[place]-food-helpline/     6  [facility] (AIFW) Distance: 2.45 miles      In-Person, Phone/Virtual   [address] [place] [address] [place] [address]  Language: Wolof, Romansh, English, Gujarati, Uriel, Kiswahili, Slovenian, Tamazight, Icelandic, Greek  Hours: Mon - Wed 9:00 AM - 5:00 PM , Thu 12:00 PM - 6:00 PM , Fri 9:00 AM - 5:00 PM , Sun 10:30 AM - 2:00 PM Appt. Only  Fees: Free   Phone: (835) 648-3306 Email: info@[facility]CLUD[facility] - [facility] NetworkW[facility] Developmental Centerw.org Website: https://www.[facility]CLUD[facility] - [facility] Healthcare NetworkW. D. Partlow Developmental Centerw.org/     Soup kitchen or free meals  7  [facility] Dinner Distance: 1.12 miles      Pickup   [address] [place] [phone]  Language: English  Hours: Tue 5:00 PM - 6:30 PM  Fees: Free   Phone: (130) 360-6726 Email: admin@[facility] HospitalCLUD[facility] - [facility] Healthcare NetworkK[facility].[employer]o Website: http://www.[facility] HospitalCLUDOC - [facility] Healthcare NetworkK[facility].org/     8  [facility] Meal Distance: 1.34 miles      Pick   [address] [phone]  Language: English  Hours: Mon 5:00 PM - 5:45 PM  Fees: Free   Phone: (262) 891-7046 Email: office@[employer].org Website: http://www.[employer].org          Important Numbers & Websites       [address].org  Poison Control   (238) 935-4183 [place]poison.org  Suicide and Crisis Lifeline   [address] [place], [place] [address]line.org  Childhelp [place] Child Abuse Hotline   567.530.9612 Childhelphotline.org  National Sexual Assault Hotline   (768) 924-6274 (HOPE) Rainn.org  National Runaway Safeline   (879) 225-2866 (RUNAWAY) lightget2play.org  Pregnancy & Postpartum Support [place]   Call/text 172-108-4530 Ppsupportmn.org  Substance Abuse National Helpline ([facility]   347-120-HELP (8715) Findtreatment.gov  Emergency Services   910

## 2023-12-15 NOTE — LETTER
December 19, 2023    Ross Smith  [address] [address]          Dear ,    We are writing to inform you of your test results.  Your complete blood counts are at baseline; with mild anemia which is not new.  Your cholesterol numbers are good, hepatitis C screening and PSA are also normal.  Liver and kidney function essentially normal; creatinine is slightly elevated possibly from fasting/not drinking enough water before labs.  I want to recheck creatinine in 1 month       Resulted Orders   Hepatitis C Screen Reflex to HCV RNA Quant and Genotype   Result Value Ref Range    Hepatitis C Antibody Nonreactive Nonreactive    Narrative    Assay performance characteristics have not been established for newborns, infants, and children.   Comprehensive metabolic panel (BMP + Alb, Alk Phos, ALT, AST, Total. Bili, TP)   Result Value Ref Range    Sodium 139 135 - 145 mmol/L      Comment:      Reference intervals for this test were updated on 09/26/2023 to more accurately reflect our healthy population. There may be differences in the flagging of prior results with similar values performed with this method. Interpretation of those prior results can be made in the context of the updated reference intervals.     Potassium 4.1 3.4 - 5.3 mmol/L    Carbon Dioxide (CO2) 22 22 - 29 mmol/L    Anion Gap 13 7 - 15 mmol/L    Urea Nitrogen 25.7 (H) 6.0 - 20.0 mg/dL    Creatinine 1.18 (H) 0.67 - 1.17 mg/dL    GFR Estimate 74 >60 mL/min/1.73m2    Calcium 9.3 8.6 - 10.0 mg/dL    Chloride 104 98 - 107 mmol/L    Glucose 89 70 - 99 mg/dL    Alkaline Phosphatase 55 40 - 150 U/L      Comment:      Reference intervals for this test were updated on 11/14/2023 to more accurately reflect our healthy population. There may be differences in the flagging of prior results with similar values performed with this method. Interpretation of those prior results can be made in the context of the updated reference intervals.    AST 23 0 - 45  U/L      Comment:      Reference intervals for this test were updated on 6/12/2023 to more accurately reflect our healthy population. There may be differences in the flagging of prior results with similar values performed with this method. Interpretation of those prior results can be made in the context of the updated reference intervals.    ALT 13 0 - 70 U/L      Comment:      Reference intervals for this test were updated on 6/12/2023 to more accurately reflect our healthy population. There may be differences in the flagging of prior results with similar values performed with this method. Interpretation of those prior results can be made in the context of the updated reference intervals.      Protein Total 7.4 6.4 - 8.3 g/dL    Albumin 4.5 3.5 - 5.2 g/dL    Bilirubin Total 0.3 <=1.2 mg/dL   Lipid Profile (Chol, Trig, HDL, LDL calc)   Result Value Ref Range    Cholesterol 165 <200 mg/dL    Triglycerides 102 <150 mg/dL    Direct Measure HDL 53 >=40 mg/dL    LDL Cholesterol Calculated 92 <=100 mg/dL    Non HDL Cholesterol 112 <130 mg/dL    Patient Fasting > 8hrs? No     Narrative    Cholesterol  Desirable:  <200 mg/dL    Triglycerides  Normal:  Less than 150 mg/dL  Borderline High:  150-199 mg/dL  High:  200-499 mg/dL  Very High:  Greater than or equal to 500 mg/dL    Direct Measure HDL  Female:  Greater than or equal to 50 mg/dL   Male:  Greater than or equal to 40 mg/dL    LDL Cholesterol  Desirable:  <100mg/dL  Above Desirable:  100-129 mg/dL   Borderline High:  130-159 mg/dL   High:  160-189 mg/dL   Very High:  >= 190 mg/dL    Non HDL Cholesterol  Desirable:  130 mg/dL  Above Desirable:  130-159 mg/dL  Borderline High:  160-189 mg/dL  High:  190-219 mg/dL  Very High:  Greater than or equal to 220 mg/dL   PSA, screen   Result Value Ref Range    Prostate Specific Antigen Screen 0.62 0.00 - 3.50 ng/mL    Narrative    This result is obtained using the Roche Elecsys total PSA method on the mario [id number] immunoassay  analyzer. Results obtained with different assay methods or kits cannot be used interchangeably.   CBC with platelets and differential   Result Value Ref Range    WBC Count 6.7 4.0 - 11.0 10e3/uL    RBC Count 5.50 4.40 - 5.90 10e6/uL    Hemoglobin 12.3 (L) 13.3 - 17.7 g/dL    Hematocrit 40.0 40.0 - 53.0 %    MCV 73 (L) 78 - 100 fL    MCH 22.4 (L) 26.5 - 33.0 pg    MCHC 30.8 (L) 31.5 - 36.5 g/dL    RDW 18.6 (H) 10.0 - 15.0 %    Platelet Count 259 150 - 450 10e3/uL    % Neutrophils 60 %    % Lymphocytes 28 %    % Monocytes 10 %    % Eosinophils 2 %    % Basophils 0 %    % Immature Granulocytes 0 %    Absolute Neutrophils 4.0 1.6 - 8.3 10e3/uL    Absolute Lymphocytes 1.9 0.8 - 5.3 10e3/uL    Absolute Monocytes 0.7 0.0 - 1.3 10e3/uL    Absolute Eosinophils 0.1 0.0 - 0.7 10e3/uL    Absolute Basophils 0.0 0.0 - 0.2 10e3/uL    Absolute Immature Granulocytes 0.0 <=0.4 10e3/uL       If you have any questions or concerns, please call the clinic at the number listed above.       Sincerely,      Jose Antonio Tracy MD

## 2023-12-15 NOTE — PROGRESS NOTES
"SUBJECTIVE:   Ross is a 53 year old, presenting for the following:  Physical        12/15/2023    11:39 AM   Additional Questions   Roomed by Jessica   Accompanied by self         12/15/2023    11:39 AM   Patient Reported Additional Medications   Patient reports taking the following new medications XARELTO 15 MG DAILY,Cyclobenzaprine 10 mg daily,Lisinopril daily,furosemide 40 mg daily, Carveditol 25mg daily, losartan 100 mg daily,       Healthy Habits:     Getting at least 3 servings of Calcium per day:  Yes    Bi-annual eye exam:  Yes    Dental care twice a year:  NO    Sleep apnea or symptoms of sleep apnea:  Daytime drowsiness    Diet:  Low salt    Frequency of exercise:  None    Taking medications regularly:  Yes    Medication side effects:  Significant flushing    Additional concerns today:  No    Exercise    Works 10 hour days    Diet:    Getting better, eats out about 4 times, trying to cook at home. Cutting down on meat; at least one day without meat    Today's PHQ-9 Score:       12/15/2023    11:25 AM   PHQ-9 SCORE   PHQ-9 Total Score MyChart 4 (Minimal depression)   PHQ-9 Total Score 4       Social History     Tobacco Use    Smoking status: Never    Smokeless tobacco: Never   Substance Use Topics    Alcohol use: No     Occasional cigars        12/15/2023    11:23 AM   Alcohol Use   Prescreen: >3 drinks/day or >7 drinks/week? No          No data to display              EtoH;   Only weekends    Hx STEMI/PFO/ASCVD   Used to follow with [facility] heart    Shots:   Not doing    Screening:   Colonoscopy   PSA    Last PSA: No results found for: \"PSA\"    Reviewed orders with patient. Reviewed health maintenance and updated orders accordingly - Yes  Patient Active Problem List   Diagnosis    STEMI (ST elevation myocardial infarction) (H)    ASCVD (arteriosclerotic cardiovascular disease)    HLD (hyperlipidemia)    PFO (patent foramen ovale)    Benign essential hypertension     Past Surgical History:   Procedure " "Laterality Date    CV HEART CATHETERIZATION WITH POSSIBLE INTERVENTION N/A 2/26/2019    thrombectomy and angioplasty to OM 2    TRANSESOPHAGEAL ECHOCARDIOGRAM INTRAOPERATIVE N/A 3/27/2019    Procedure: TRANSESOPHAGEAL ECHOCARDIOGRAM INTRAOPERATIVE (GENERAL ANESTHESIA);  Surgeon: GENERIC ANESTHESIA PROVIDER;  Location:  OR       Social History     Tobacco Use    Smoking status: Never    Smokeless tobacco: Never   Substance Use Topics    Alcohol use: No     No family history on file.        Reviewed and updated as needed this visit by clinical staff   Tobacco  Allergies  Meds              Reviewed and updated as needed this visit by Provider                 ROS  CONSTITUTIONAL: NEGATIVE for fever, chills, change in weight  INTEGUMENTARY/SKIN: NEGATIVE for worrisome rashes, moles or lesions  EYES: NEGATIVE for vision changes or irritation  ENT: NEGATIVE for ear, mouth and throat problems  RESP: NEGATIVE for significant cough or SOB  CV: NEGATIVE for chest pain, palpitations or peripheral edema  GI: NEGATIVE for nausea, abdominal pain, heartburn, or change in bowel habits   male: positive for frequency ? diuretic  MUSCULOSKELETAL: NEGATIVE for significant arthralgias or myalgia  NEURO: NEGATIVE for weakness, dizziness or paresthesias  PSYCHIATRIC: NEGATIVE for changes in mood or affect    OBJECTIVE:   BP (!) 142/91   Pulse 66   Temp 97.6  F (36.4  C) (Oral)   Resp 18   Ht 1.715 m (5' 7.5\")   Wt 71.7 kg (158 lb)   SpO2 99%   BMI 24.38 kg/m      Physical Exam  GENERAL: healthy, alert and no distress  EYES: Eyes grossly normal to inspection, PERRL and conjunctivae and sclerae normal  HENT: ear canals and TM's normal, nose and mouth without ulcers or lesions  NECK: no adenopathy and thyroid normal to palpation  RESP: lungs clear to auscultation - no rales, rhonchi or wheezes  CV: regular rate and rhythm, normal S1 S2, no S3 or S4, no murmur, click or rub, no peripheral edema  ABDOMEN: soft, nontender, no " hepatosplenomegaly, no masses and bowel sounds normal  MS: no gross musculoskeletal defects noted, no edema  SKIN: no suspicious lesions or rashes  NEURO: Normal strength and tone, mentation intact and speech normal  PSYCH: mentation appears normal, affect normal/bright    Diagnostic Test Results:  Labs reviewed in Epic    ASSESSMENT/PLAN:   Ross was seen today for physical.    Diagnoses and all orders for this visit:    Routine history and physical examination of adult  -     Comprehensive metabolic panel (BMP + Alb, Alk Phos, ALT, AST, Total. Bili, TP); Future  -     Comprehensive metabolic panel (BMP + Alb, Alk Phos, ALT, AST, Total. Bili, TP)    ST elevation myocardial infarction (STEMI), unspecified artery (H)        -  Stable    ASCVD (arteriosclerotic cardiovascular disease)  -     CBC with platelets and differential; Future  -     CBC with platelets and differential    Hyperlipidemia LDL goal <100  -     Lipid Profile (Chol, Trig, HDL, LDL calc); Future  -     Lipid Profile (Chol, Trig, HDL, LDL calc)    HTN, goal below 140/90  -     Comprehensive metabolic panel (BMP + Alb, Alk Phos, ALT, AST, Total. Bili, TP); Future  -     Comprehensive metabolic panel (BMP + Alb, Alk Phos, ALT, AST, Total. Bili, TP)    PFO (patent foramen ovale)        -  per cardiology, asymptomatic    Cigar smoker       -  encouraged to quit smoking.    Need for hepatitis C screening test  -     Hepatitis C Screen Reflex to HCV RNA Quant and Genotype; Future  -     Hepatitis C Screen Reflex to HCV RNA Quant and Genotype    Screen for colon cancer  -     Colonoscopy Screening  Referral; Future  -     PSA, screen; Future  -     PSA, screen    Screening for prostate cancer      Patient has been advised of split billing requirements and indicates understanding: Yes      COUNSELING:   Reviewed preventive health counseling, as reflected in patient instructions       Regular exercise       Healthy diet/nutrition        Immunizations  Declined: Covid-19, Influenza, Pneumococcal, and Zoster due to Concerns about side effects/safety             Consider Hep C screening for all patients one time for ages 18-79 years       Colorectal cancer screening       Prostate cancer screening    He reports that he has never smoked. He has never used smokeless tobacco.    {Counseling Resources  [place] Preventive Services Task Force  Cholesterol Screening  Health diet/nutrition  Pooled Cohorts Equation Calculator  [employer]'s MyPlate  ASA Prophylaxis  Lung CA Screening  Osteoporosis prevention/bone health  {Prostate Cancer Screening  Consider for men 55-69 per guidance from USPSTF     Jose Antonio Tracy MD  [facility]  Answers submitted by the patient for this visit:  Patient Health Questionnaire (Submitted on 12/15/2023)  If you checked off any problems, how difficult have these problems made it for you to do your work, take care of things at home, or get along with other people?: Not difficult at all  PHQ9 TOTAL SCORE: 4

## 2023-12-16 LAB — HCV AB SERPL QL IA: NONREACTIVE

## 2024-11-25 ENCOUNTER — TELEPHONE (OUTPATIENT)
Dept: FAMILY MEDICINE | Facility: CLINIC | Age: 54
End: 2024-11-25
Payer: COMMERCIAL

## 2024-11-25 NOTE — TELEPHONE ENCOUNTER
Patient Quality Outreach    Patient is due for the following:   Colon Cancer Screening  Physical Preventive Adult Physical    Action(s) Taken:   Schedule a Adult Preventative    Type of outreach:    Sent letter.    Questions for provider review:    None           GRANT VALDES, CMA

## 2024-11-25 NOTE — LETTER
November 25, 2024    To  Ross Smith  [address] [address]    Your team at [facility] cares about your health. We have reviewed your chart and based on our findings; we are making the following recommendations to better manage your health.     You are in particular need of attention regarding the following:     Call or MyChart message your clinic to schedule a colonoscopy, schedule/ a FIT Test, or order a Cologuard test. If you are unsure what type of test you need, please call your clinic and speak to clinic staff.   Colon cancer is now the second leading cause of cancer-related deaths in the [facility] for both men and women and there are over 130,000 new cases and 50,000 deaths per year from colon cancer. Colonoscopies can prevent 90-95% of these deaths. Problem lesions can be removed before they ever become cancer. This test is not only looking for cancer, but also getting rid of precancerous lesions.   If you are under/uninsured, we recommend you contact the [employer] Program.[employer] is a free colorectal cancer screening program that provides colonoscopies for eligible under/uninsured [place] men and women. If you are interested in receiving a free colonoscopy, please call [employer] at t [phone] (mention code [employer]) to see if you're eligible. Please have them send us the results.   PREVENTATIVE VISIT: Physical    If you have already completed these items, please contact the clinic via phone or   [employer] so your care team can review and update your records. Thank you for   choosing [facility] for your healthcare needs. For any questions,   concerns, or to schedule an appointment please contact our clinic.    Healthy Regards,      Your [facility] Care Team

## 2025-01-09 ENCOUNTER — TELEPHONE (OUTPATIENT)
Dept: FAMILY MEDICINE | Facility: CLINIC | Age: 55
End: 2025-01-09
Payer: COMMERCIAL

## 2025-01-09 NOTE — TELEPHONE ENCOUNTER
Routing to Dr. Demarcus Mabry, nurse who works with Dr. Jose Juan Estrada (vascular surgeon), calling    They did a thoracic aortic stent 2 months ago    They did a follow up CT scan on him 2 days ago and found incidental finding and Dr. Estrada would like PCP to follow up with this.     Incidental finding of bony pelvis changes. Wondering if PCP would like to do a follow up MRI. RN gave fax number and they will fax results to provider as well    Joellen Hayes RN

## 2025-03-05 NOTE — Clinical Note
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

## 2025-07-21 ENCOUNTER — TELEPHONE (OUTPATIENT)
Dept: FAMILY MEDICINE | Facility: CLINIC | Age: 55
End: 2025-07-21

## 2025-07-21 NOTE — TELEPHONE ENCOUNTER
Writer called Krysta and relayed provider's message. Krysta verbalized understanding.     Marzena Polk RN  [facility]

## 2025-07-21 NOTE — TELEPHONE ENCOUNTER
Home Care is calling regarding an established patient with [facility].  Requesting orders from: PCP  PROVIDER AUTHORIZATION REQUIRED: RN unable to provide verbal approval for all orders.  See below for additional information.  RN will contact Home care with information after provider review.  Is this a request for a temporary pause in the home care episode?  No    Orders Requested    Skilled Nursing  Request for initial certification (first set of orders)   Frequency:   Saw him 1 time last week  4x this week  3x for 1 week  2x for 4 weeks    1x for 2 weeks   1-5 visits PRN  RN gave verbal order: No: Patient has not been seen since 2023, Caller asking for orders prior to this week, Patient cancelled appt today and rescheduled for 7/24/25.       Physical Therapy  Request for initial evaluation and treatment (one time)   RN gave verbal order: No: Patient has not been seen since 2023, Caller asking for orders prior to this week, Patient cancelled appt today and rescheduled for 7/24/25.       Occupational Therapy  Request for initial evaluation and treatment (one time)   RN gave verbal order: No: Patient has not been seen since 2023, Caller asking for orders prior to this week, Patient cancelled appt today and rescheduled for 7/24/25.       Social Work  Request for initial evaluation and treatment (one time)   RN gave verbal order: No: Patient has not been seen since 2023, Caller asking for orders prior to this week, Patient cancelled appt today and rescheduled for 7/24/25.         Phone number Home Care can be reached at: [phone]  Okay to leave a detailed message?: Yes     Contacts       Contact Date/Time Type Contact Phone/Fax    07/21/2025 02:58 PM CDT Phone (Incoming) Krysta (Home Care) [phone]     Debbi Cordova, RN

## 2025-07-22 ENCOUNTER — TELEPHONE (OUTPATIENT)
Dept: FAMILY MEDICINE | Facility: CLINIC | Age: 55
End: 2025-07-22
Payer: COMMERCIAL

## 2025-07-22 NOTE — TELEPHONE ENCOUNTER
Home Care is calling regarding an established patient with [facility].  Requesting orders from: Jose Antonio Tracy  RN APPROVED: RN able to provide verbal orders.  Home Care will send orders for signature.  RN will close encounter.  Is this a request for a temporary pause in the home care episode?  No    Orders Requested    Physical Therapy  Request for initial certification (first set of orders)   Frequency: 1x for per week 7 weeks  RN gave verbal order: Yes      Phone number Home Care can be reached at: [phone]  Okay to leave a detailed message?: Yes      Martine Cordova RN

## 2025-07-25 ENCOUNTER — OFFICE VISIT (OUTPATIENT)
Dept: FAMILY MEDICINE | Facility: CLINIC | Age: 55
End: 2025-07-25
Payer: COMMERCIAL

## 2025-07-25 VITALS
HEART RATE: 83 BPM | SYSTOLIC BLOOD PRESSURE: 99 MMHG | HEIGHT: 68 IN | OXYGEN SATURATION: 100 % | TEMPERATURE: 98.2 F | DIASTOLIC BLOOD PRESSURE: 67 MMHG | RESPIRATION RATE: 20 BRPM | BODY MASS INDEX: 21.37 KG/M2 | WEIGHT: 141 LBS

## 2025-07-25 DIAGNOSIS — Z78.9 ON COMPLEX MEDICATION REGIMEN: ICD-10-CM

## 2025-07-25 DIAGNOSIS — I82.C29 CHRONIC DEEP VEIN THROMBOSIS (DVT) OF INTERNAL JUGULAR VEIN (H): ICD-10-CM

## 2025-07-25 DIAGNOSIS — Z09 HOSPITAL DISCHARGE FOLLOW-UP: Primary | ICD-10-CM

## 2025-07-25 DIAGNOSIS — K14.6 TONGUE PAIN: ICD-10-CM

## 2025-07-25 PROBLEM — I50.20 HFREF (HEART FAILURE WITH REDUCED EJECTION FRACTION) (H): Status: ACTIVE | Noted: 2024-06-21

## 2025-07-25 PROBLEM — R57.8 HEMORRHAGIC SHOCK (H): Status: ACTIVE | Noted: 2025-06-21

## 2025-07-25 PROBLEM — K92.2 LOWER GI BLEED: Status: ACTIVE | Noted: 2025-06-21

## 2025-07-25 PROBLEM — I25.2 HISTORY OF MI (MYOCARDIAL INFARCTION): Status: ACTIVE | Noted: 2020-12-02

## 2025-07-25 PROBLEM — Q21.11 SECUNDUM ASD: Status: ACTIVE | Noted: 2020-12-02

## 2025-07-25 PROBLEM — K55.9 ISCHEMIC BOWEL DISEASE: Status: ACTIVE | Noted: 2025-06-21

## 2025-07-25 PROBLEM — I71.03 DISSECTION OF THORACOABDOMINAL AORTA (H): Status: ACTIVE | Noted: 2024-11-19

## 2025-07-25 PROBLEM — I50.21 ACUTE SYSTOLIC CHF (CONGESTIVE HEART FAILURE) (H): Status: ACTIVE | Noted: 2020-12-02

## 2025-07-25 PROBLEM — Z95.810 ICD (IMPLANTABLE CARDIOVERTER-DEFIBRILLATOR) IN PLACE: Status: ACTIVE | Noted: 2025-02-07

## 2025-07-25 PROBLEM — G72.81 CRITICAL ILLNESS MYOPATHY: Status: ACTIVE | Noted: 2025-07-11

## 2025-07-25 LAB
ANION GAP SERPL CALCULATED.3IONS-SCNC: 12 MMOL/L (ref 7–15)
BASOPHILS # BLD AUTO: 0 10E3/UL (ref 0–0.2)
BASOPHILS NFR BLD AUTO: 1 %
BUN SERPL-MCNC: 10.3 MG/DL (ref 6–20)
CALCIUM SERPL-MCNC: 9.4 MG/DL (ref 8.8–10.4)
CHLORIDE SERPL-SCNC: 106 MMOL/L (ref 98–107)
CREAT SERPL-MCNC: 1.19 MG/DL (ref 0.67–1.17)
EGFRCR SERPLBLD CKD-EPI 2021: 72 ML/MIN/1.73M2
EOSINOPHIL # BLD AUTO: 0.1 10E3/UL (ref 0–0.7)
EOSINOPHIL NFR BLD AUTO: 2 %
ERYTHROCYTE [DISTWIDTH] IN BLOOD BY AUTOMATED COUNT: 18.6 % (ref 10–15)
GLUCOSE SERPL-MCNC: 100 MG/DL (ref 70–99)
HCO3 SERPL-SCNC: 22 MMOL/L (ref 22–29)
HCT VFR BLD AUTO: 31.5 % (ref 40–53)
HGB BLD-MCNC: 9.2 G/DL (ref 13.3–17.7)
IMM GRANULOCYTES # BLD: 0 10E3/UL
IMM GRANULOCYTES NFR BLD: 0 %
LYMPHOCYTES # BLD AUTO: 1.4 10E3/UL (ref 0.8–5.3)
LYMPHOCYTES NFR BLD AUTO: 33 %
MCH RBC QN AUTO: 25.3 PG (ref 26.5–33)
MCHC RBC AUTO-ENTMCNC: 29.2 G/DL (ref 31.5–36.5)
MCV RBC AUTO: 87 FL (ref 78–100)
MONOCYTES # BLD AUTO: 0.4 10E3/UL (ref 0–1.3)
MONOCYTES NFR BLD AUTO: 10 %
NEUTROPHILS # BLD AUTO: 2.3 10E3/UL (ref 1.6–8.3)
NEUTROPHILS NFR BLD AUTO: 55 %
PLATELET # BLD AUTO: 235 10E3/UL (ref 150–450)
POTASSIUM SERPL-SCNC: 4.8 MMOL/L (ref 3.4–5.3)
RBC # BLD AUTO: 3.63 10E6/UL (ref 4.4–5.9)
SODIUM SERPL-SCNC: 140 MMOL/L (ref 135–145)
WBC # BLD AUTO: 4.1 10E3/UL (ref 4–11)

## 2025-07-25 PROCEDURE — 99214 OFFICE O/P EST MOD 30 MIN: CPT

## 2025-07-25 PROCEDURE — 80048 BASIC METABOLIC PNL TOTAL CA: CPT

## 2025-07-25 PROCEDURE — 85025 COMPLETE CBC W/AUTO DIFF WBC: CPT

## 2025-07-25 PROCEDURE — G2211 COMPLEX E/M VISIT ADD ON: HCPCS

## 2025-07-25 PROCEDURE — 99417 PROLNG OP E/M EACH 15 MIN: CPT

## 2025-07-25 PROCEDURE — 3074F SYST BP LT 130 MM HG: CPT

## 2025-07-25 PROCEDURE — 36415 COLL VENOUS BLD VENIPUNCTURE: CPT

## 2025-07-25 PROCEDURE — 3078F DIAST BP <80 MM HG: CPT

## 2025-07-25 RX ORDER — HYDRALAZINE HYDROCHLORIDE 25 MG/1
25 TABLET, FILM COATED ORAL 3 TIMES DAILY
COMMUNITY
Start: 2025-07-17

## 2025-07-25 RX ORDER — DOXAZOSIN 2 MG/1
2 TABLET ORAL
COMMUNITY
Start: 2025-07-18

## 2025-07-25 RX ORDER — ACETAMINOPHEN 500 MG
1000 TABLET ORAL
COMMUNITY
Start: 2025-07-11

## 2025-07-25 RX ORDER — AMLODIPINE BESYLATE 10 MG/1
5 TABLET ORAL DAILY
COMMUNITY
Start: 2025-07-25

## 2025-07-25 RX ORDER — EPLERENONE 25 MG/1
50 TABLET ORAL DAILY
COMMUNITY
Start: 2025-07-17

## 2025-07-25 RX ORDER — ROSUVASTATIN CALCIUM 10 MG/1
1 TABLET, COATED ORAL DAILY
COMMUNITY
Start: 2025-04-09

## 2025-07-25 RX ORDER — LIDOCAINE HYDROCHLORIDE 20 MG/ML
15 SOLUTION OROPHARYNGEAL
Qty: 200 ML | Refills: 0 | Status: SHIPPED | OUTPATIENT
Start: 2025-07-25

## 2025-07-25 RX ORDER — PANTOPRAZOLE SODIUM 40 MG/1
40 TABLET, DELAYED RELEASE ORAL
COMMUNITY
Start: 2025-07-17

## 2025-07-25 RX ORDER — CHOLECALCIFEROL (VITAMIN D3) 50 MCG
50 TABLET ORAL DAILY
COMMUNITY

## 2025-07-25 RX ORDER — SACUBITRIL AND VALSARTAN 97; 103 MG/1; MG/1
1 TABLET, FILM COATED ORAL 2 TIMES DAILY
COMMUNITY
Start: 2025-07-17

## 2025-07-25 RX ORDER — TRAZODONE HYDROCHLORIDE 100 MG/1
100 TABLET ORAL AT BEDTIME
COMMUNITY
Start: 2025-07-17

## 2025-07-25 RX ORDER — NYSTATIN 100000 [USP'U]/ML
10 SUSPENSION ORAL 4 TIMES DAILY
COMMUNITY
Start: 2025-07-23 | End: 2025-08-07

## 2025-07-25 RX ORDER — SENNOSIDES 8.6 MG/1
8.6-17.2 TABLET ORAL DAILY PRN
COMMUNITY
Start: 2025-07-17

## 2025-07-25 RX ORDER — ISOSORBIDE DINITRATE 10 MG/1
10 TABLET ORAL 3 TIMES DAILY
COMMUNITY
Start: 2025-07-17

## 2025-07-25 NOTE — PROGRESS NOTES
Assessment & Plan     Hospital discharge follow-up  Improving  - CBC with platelets and differential  - Basic metabolic panel  (Ca, Cl, CO2, Creat, Gluc, K, Na, BUN)    On complex medication regimen  - Med Therapy Management Referral   [facility] rec    Tongue pain 2/2 ETT pressure injury +thrush   Continue nystatin swish and spit as previously prescribed  Start  - lidocaine, viscous, (XYLOCAINE) 2 % solution  Dispense: 200 mL; Refill: 0    Hypotension  - Check BP prior to medication.  If below 120/70-hold hydralazine as discussed      MED REC REQUIRED  Post Medication Reconciliation Status: unable to reconcile discharge medications due to patient unaware what he is currently taking, med rec updated per discharge notes    Follow-up for routine annual, brownbag med rec as discussed    Subjective   Ross is a 55 year old, presenting for the following health issues:  Hospital F/U and Medication Reconciliation (Doesn't know the names of his medications)      Patient reports for hospital follow-up.  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      Prior to this admission he was additionally admitted from 19 June to 11 July where he was subsequently discharged to [facility] for rehab.  This was a complex admission requiring multiple intubations, TPN support, ICU delirium, and multiple medication adjustments. Right IJ DVT noted while admitted.  Discharged on Eliquis.    Hemoglobin last done 12 July, 8.1.  Will repeat      Since discharge, patient denies subsequent blood in stool, largely feeling better.  Reports energy improving.  Does note ongoing pain to tongue.  Experienced a pressure ulcer from his endotracheal tube.  Currently using nystatin swish and spit with improvement to oral thrush but has ongoing pain. been taking Tylenol with only modest improvement, denies pain  elsewhere.    Has home health established, going well.  Feels he has adequate support at home.  Has additional help from family members.  Reports his return to work goal is September.  Will require cardiology clearance for this    Of note-patient reports he was discharged on multiple medications however he does not know what these are why he is taking them.  Does not have list or meds with him.  Instructed to bring to follow-up for brownbag med rec.    He is additionally acutely concerned for periodic asymptomatic hypotension.  Reports he checks his blood pressure before taking any medications, occasionally seeing results 70s/50s up to 130s/90s.  He is afraid of becoming hypotensive and reports he does not take any medication whatsoever when results are low.  Extensive education provided on this    Currently up 8 pounds since discharge.  Reports modest adherence to low-sodium diet.  Denies ankle swelling, SOB, orthopnea.    Scheduled to follow-up with cardiology in 2 weeks.        7/25/2025     9:52 AM   Additional Questions   Roomed by Ghazala GOMES     [facility]          Hospital Follow-up Visit:    Hospital/Nursing Home/ Rehab Facility: [facility]  Date of Admission: 07/11/2025  Date of Discharge: 07/18/2025  Reason(s) for Admission: Critical illness myopathy (Primary Dx);   Ischemic bowel disease (HC);   Metabolic encephalopathy;   Lower GI bleed;   Dissection of thoracoabdominal aorta (HC);   Essential hypertension;   Acute deep vein thrombosis (DVT) of upper extremity, unspecified laterality, unspecified vein (HC);   HFrEF (heart failure with reduced ejection fraction) (HC);   Oral thrush;   Dissection of thoracoabdominal aorta (HC)   Do you have any other stressors you would like to discuss with your provider? Health Concerns, concerned BP has been too low    Problems taking medications regularly:  Yes  Medication changes since discharge: Yes  Problems adhering to non-medication therapy:  None    Summary of  "hospitalization:  CareEverywhere information obtained and reviewed  Diagnostic Tests/Treatments reviewed.  Follow up needed: Repeat CBC BMP today  Other Healthcare Providers Involved in Patient s Care:         Homecare, Specialist appointment - cardiology, GI, PCP for routine annual, and MT  Update since discharge: improved.         Plan of care communicated with patient                 Objective    BP 99/67   Pulse 83   Temp 98.2  F (36.8  C) (Oral)   Resp 20   Ht 1.715 m (5' 7.5\")   Wt 64 kg (141 lb)   SpO2 100%   BMI 21.76 kg/m    Body mass index is 21.76 kg/m .  Physical Exam   GENERAL: alert and no distress  NECK: no adenopathy, no asymmetry, masses, or scars  RESP: lungs clear to auscultation - no rales, rhonchi or wheezes  CV: regular rate and rhythm, no peripheral edema  MS: no gross musculoskeletal defects noted            Signed Electronically by: XENIA Self CNP  The longitudinal plan of care for the diagnosis(es)/condition(s) as documented were addressed during this visit. Due to the added complexity in care, I will continue to support Ross in the subsequent management and with ongoing continuity of care.  I personally spent a total of  >60  minutes on the day of the visit, excluding procedures. Please see note for details about time spent which includes:      pre-visit preparation    reviewing records    face to face time with the patient    timely documentation of the encounter    ordering medications/tests    communication with care team    care coordination.   "

## 2025-07-25 NOTE — PATIENT INSTRUCTIONS
See your cardiologist as scheduled on 8 AUG ([facility] [place] cardiac clinic at [facility])  Follow up with our specialty pharmacist for a med rec. Please bring ALL of your medications to this appointment  Check your blood pressure before taking your hydralazine. If it is under 120 on the top number or 70 on the bottom number, skip that dose   Follow up in clinic next week for ongoing plan of care development

## 2025-07-28 ENCOUNTER — TELEPHONE (OUTPATIENT)
Dept: FAMILY MEDICINE | Facility: CLINIC | Age: 55
End: 2025-07-28
Payer: COMMERCIAL

## 2025-07-28 ENCOUNTER — MEDICAL CORRESPONDENCE (OUTPATIENT)
Dept: HEALTH INFORMATION MANAGEMENT | Facility: CLINIC | Age: 55
End: 2025-07-28
Payer: COMMERCIAL

## 2025-07-28 NOTE — TELEPHONE ENCOUNTER
MTM referral from: [place] clinic visit (referral by provider)    MTM referral outreach attempt #1 on July 28, 2025 at 1:03 PM      Outcome: Spoke with patient declined due to current insurance not covering MTM     Use private pay for the carrier/Plan on the flowsheet      Natalie Sanchez CMA  MTM

## 2025-07-31 ENCOUNTER — OFFICE VISIT (OUTPATIENT)
Dept: FAMILY MEDICINE | Facility: CLINIC | Age: 55
End: 2025-07-31
Payer: COMMERCIAL

## 2025-07-31 VITALS
TEMPERATURE: 97 F | HEART RATE: 87 BPM | HEIGHT: 68 IN | WEIGHT: 145.6 LBS | SYSTOLIC BLOOD PRESSURE: 115 MMHG | DIASTOLIC BLOOD PRESSURE: 75 MMHG | RESPIRATION RATE: 18 BRPM | BODY MASS INDEX: 22.07 KG/M2 | OXYGEN SATURATION: 98 %

## 2025-07-31 DIAGNOSIS — I50.21 ACUTE SYSTOLIC CHF (CONGESTIVE HEART FAILURE) (H): ICD-10-CM

## 2025-07-31 DIAGNOSIS — Z13.6 SCREENING FOR CARDIOVASCULAR CONDITION: Primary | ICD-10-CM

## 2025-07-31 DIAGNOSIS — I50.20 HFREF (HEART FAILURE WITH REDUCED EJECTION FRACTION) (H): ICD-10-CM

## 2025-07-31 DIAGNOSIS — I21.3 ST ELEVATION MYOCARDIAL INFARCTION (STEMI), UNSPECIFIED ARTERY (H): ICD-10-CM

## 2025-07-31 DIAGNOSIS — Z12.11 SCREEN FOR COLON CANCER: ICD-10-CM

## 2025-07-31 DIAGNOSIS — I71.03 DISSECTION OF THORACOABDOMINAL AORTA (H): ICD-10-CM

## 2025-07-31 DIAGNOSIS — Q21.12 PFO (PATENT FORAMEN OVALE): ICD-10-CM

## 2025-07-31 NOTE — PROGRESS NOTES
"  Assessment & Plan       ICD-10-CM    1. HLD (hyperlipidemia)  E78.5       2. Screening for cardiovascular condition  Z13.6       3. Acute systolic CHF (congestive heart failure) (H)  I50.21       4. Screen for colon cancer  Z12.11         Recommend holding isosorbide mid day  Discussed importance of maintaining low pressures  Follow-up with cardiology to discuss further      A total of 40 minutes spent face-to-face with greater than 50% of the time spent in counseling and coordinating cares of the issues above   MED REC REQUIRED  Post Medication Reconciliation Status:         Ro Hawkins is a 55 year old, presenting for the following health issues:  Recheck Medication      7/31/2025    10:59 AM   Additional Questions   Roomed by Shanda   Accompanied by none     HPI      History of aortic dissection, HFpEF  Pressures are usually very low after taking mid-day medication doses  Has already stopped hydralazine  Cardiology visit in 1 week  BP Readings from Last 6 Encounters:   07/31/25 115/75   07/25/25 99/67   12/15/23 129/82   01/16/20 110/72   04/11/19 125/76   03/27/19 123/90     Wt Readings from Last 4 Encounters:   07/31/25 66 kg (145 lb 9.6 oz)   07/25/25 64 kg (141 lb)   12/15/23 71.7 kg (158 lb)   01/16/20 72.3 kg (159 lb 4.8 oz)                 Review of Systems  Constitutional, HEENT, cardiovascular, pulmonary, gi and gu systems are negative, except as otherwise noted.      Objective    /75   Pulse 87   Temp 97  F (36.1  C) (Temporal)   Resp 18   Ht 1.715 m (5' 7.5\")   Wt 66 kg (145 lb 9.6 oz)   SpO2 98%   BMI 22.47 kg/m    Body mass index is 22.47 kg/m .  Physical Exam  Constitutional:       General: He is not in acute distress.     Appearance: Normal appearance. He is well-developed. He is not ill-appearing.   HENT:      Head: Normocephalic and atraumatic.      Right Ear: External ear normal.      Left Ear: External ear normal.      Nose: Nose normal.   Eyes:      General: No scleral " icterus.     Extraocular Movements: Extraocular movements intact.      Conjunctiva/sclera: Conjunctivae normal.   Cardiovascular:      Rate and Rhythm: Normal rate.   Pulmonary:      Effort: Pulmonary effort is normal.   Musculoskeletal:      Cervical back: Normal range of motion and neck supple.   Skin:     General: Skin is warm and dry.   Neurological:      Mental Status: He is alert and oriented to person, place, and time.   Psychiatric:         Behavior: Behavior normal.         Thought Content: Thought content normal.         Judgment: Judgment normal.                    Signed Electronically by: Leon Gonzales MD

## 2025-07-31 NOTE — PATIENT INSTRUCTIONS
Ross    It was a pleasure seeing you in clinic today.  Here's the plan:    Hold mid day dose of isosorbide and continue to monitor pressures.  Discuss with cardiology next week.    Let me know if you have questions.    Leon Gonzales MD

## 2025-08-05 ENCOUNTER — MEDICAL CORRESPONDENCE (OUTPATIENT)
Dept: HEALTH INFORMATION MANAGEMENT | Facility: CLINIC | Age: 55
End: 2025-08-05
Payer: COMMERCIAL

## 2025-08-06 ENCOUNTER — MEDICAL CORRESPONDENCE (OUTPATIENT)
Dept: HEALTH INFORMATION MANAGEMENT | Facility: CLINIC | Age: 55
End: 2025-08-06
Payer: COMMERCIAL

## 2025-08-12 ENCOUNTER — NURSE TRIAGE (OUTPATIENT)
Dept: FAMILY MEDICINE | Facility: CLINIC | Age: 55
End: 2025-08-12
Payer: COMMERCIAL

## 2025-08-12 RX ORDER — ROSUVASTATIN CALCIUM 10 MG/1
10 TABLET, COATED ORAL DAILY
OUTPATIENT
Start: 2025-08-12

## 2025-08-12 RX ORDER — AMLODIPINE BESYLATE 10 MG/1
5 TABLET ORAL DAILY
OUTPATIENT
Start: 2025-08-12

## 2025-08-12 RX ORDER — DOXAZOSIN 2 MG/1
2 TABLET ORAL
OUTPATIENT
Start: 2025-08-12

## 2025-08-13 ENCOUNTER — MEDICAL CORRESPONDENCE (OUTPATIENT)
Dept: HEALTH INFORMATION MANAGEMENT | Facility: CLINIC | Age: 55
End: 2025-08-13
Payer: COMMERCIAL

## 2025-08-14 ENCOUNTER — TELEPHONE (OUTPATIENT)
Dept: FAMILY MEDICINE | Facility: CLINIC | Age: 55
End: 2025-08-14
Payer: COMMERCIAL

## 2025-08-14 PROBLEM — R57.8 HEMORRHAGIC SHOCK (H): Status: RESOLVED | Noted: 2025-06-21 | Resolved: 2025-08-14

## 2025-08-14 PROBLEM — I21.3 STEMI (ST ELEVATION MYOCARDIAL INFARCTION) (H): Status: RESOLVED | Noted: 2019-02-26 | Resolved: 2025-08-14

## 2025-08-18 ENCOUNTER — OFFICE VISIT (OUTPATIENT)
Dept: FAMILY MEDICINE | Facility: CLINIC | Age: 55
End: 2025-08-18
Payer: COMMERCIAL

## 2025-08-18 VITALS
SYSTOLIC BLOOD PRESSURE: 109 MMHG | DIASTOLIC BLOOD PRESSURE: 74 MMHG | TEMPERATURE: 98.2 F | HEART RATE: 81 BPM | HEIGHT: 68 IN | WEIGHT: 148 LBS | RESPIRATION RATE: 16 BRPM | OXYGEN SATURATION: 99 % | BODY MASS INDEX: 22.43 KG/M2

## 2025-08-18 DIAGNOSIS — I50.21 ACUTE SYSTOLIC CHF (CONGESTIVE HEART FAILURE) (H): ICD-10-CM

## 2025-08-18 DIAGNOSIS — I10 HTN, GOAL BELOW 140/90: ICD-10-CM

## 2025-08-18 DIAGNOSIS — I71.019 AORTIC DISSECTION DISTAL TO LEFT SUBCLAVIAN (H): ICD-10-CM

## 2025-08-18 DIAGNOSIS — Z87.19 HISTORY OF GI BLEED: ICD-10-CM

## 2025-08-18 DIAGNOSIS — E78.5 HYPERLIPIDEMIA, UNSPECIFIED HYPERLIPIDEMIA TYPE: ICD-10-CM

## 2025-08-18 DIAGNOSIS — R19.7 DIARRHEA OF PRESUMED INFECTIOUS ORIGIN: Primary | ICD-10-CM

## 2025-08-18 PROCEDURE — 1126F AMNT PAIN NOTED NONE PRSNT: CPT | Performed by: FAMILY MEDICINE

## 2025-08-18 PROCEDURE — 99214 OFFICE O/P EST MOD 30 MIN: CPT | Performed by: FAMILY MEDICINE

## 2025-08-18 PROCEDURE — 3074F SYST BP LT 130 MM HG: CPT | Performed by: FAMILY MEDICINE

## 2025-08-18 PROCEDURE — 3078F DIAST BP <80 MM HG: CPT | Performed by: FAMILY MEDICINE

## 2025-08-18 RX ORDER — EPLERENONE 25 MG/1
50 TABLET ORAL DAILY
Qty: 180 TABLET | Refills: 1 | Status: SHIPPED | OUTPATIENT
Start: 2025-08-18

## 2025-08-18 RX ORDER — NITROGLYCERIN 0.4 MG/1
TABLET SUBLINGUAL
Qty: 20 TABLET | Refills: 0 | Status: SHIPPED | OUTPATIENT
Start: 2025-08-18

## 2025-08-18 RX ORDER — FAMOTIDINE 40 MG/1
40 TABLET, FILM COATED ORAL 2 TIMES DAILY
Qty: 120 TABLET | Refills: 1 | Status: SHIPPED | OUTPATIENT
Start: 2025-08-18

## 2025-08-18 ASSESSMENT — ENCOUNTER SYMPTOMS: DIARRHEA: 1

## 2025-08-18 ASSESSMENT — PAIN SCALES - GENERAL: PAINLEVEL_OUTOF10: NO PAIN (0)

## 2025-08-19 RX ORDER — PANTOPRAZOLE SODIUM 40 MG/1
40 TABLET, DELAYED RELEASE ORAL DAILY
COMMUNITY
Start: 2025-08-18

## 2025-08-26 ENCOUNTER — TELEPHONE (OUTPATIENT)
Dept: FAMILY MEDICINE | Facility: CLINIC | Age: 55
End: 2025-08-26
Payer: COMMERCIAL

## 2025-08-26 DIAGNOSIS — I82.629 ACUTE DEEP VEIN THROMBOSIS (DVT) OF UPPER EXTREMITY, UNSPECIFIED LATERALITY, UNSPECIFIED VEIN (H): Primary | ICD-10-CM

## 2025-08-26 LAB — C DIFF TOX B STL QL: NEGATIVE

## (undated) DEVICE — VALVE HEMOSTASIS .096" COPILOT MECH 1003331

## (undated) DEVICE — DEFIB PRO-PADZ LVP LQD GEL ADULT 8900-2105-01

## (undated) DEVICE — INTRODUCER CATH VASC 5FRX10CM  MPIS-501-NT-U-SST

## (undated) DEVICE — CLOSURE ANGIOSEAL 6FR 610130

## (undated) DEVICE — TOTE ANGIO CORP PC15AT SAN32CC83O

## (undated) DEVICE — CATH BALLOON EMERGE 2.25X12MMH7493918912220

## (undated) DEVICE — CATH PRONTO EXTRACTION 5010

## (undated) DEVICE — GUIDEWIRE VASC 0.014INX180CM RUNTHROUGH 25-1011

## (undated) DEVICE — CATH ANGIO INFINITI 3DRC 6FRX100CM 534676T

## (undated) DEVICE — CATH ANGIO JUDKINS JL4 6FRX100CM INFINITI 534620T

## (undated) DEVICE — INTRO SHEATH 6FRX10CM PINNACLE RSS602

## (undated) DEVICE — MANIFOLD KIT ANGIO AUTOMATED 014613

## (undated) DEVICE — KIT HAND CONTROL ANGIOTOUCH ACIST 65CM AT-P65

## (undated) DEVICE — INFL DVC KIT W/10CC NITRO IN4530

## (undated) DEVICE — CATH GUIDING  6F MACH 1 GUIDING CLS3.5

## (undated) RX ORDER — HEPARIN SODIUM 1000 [USP'U]/ML
INJECTION, SOLUTION INTRAVENOUS; SUBCUTANEOUS
Status: DISPENSED
Start: 2019-02-26

## (undated) RX ORDER — FLUMAZENIL 0.1 MG/ML
INJECTION, SOLUTION INTRAVENOUS
Status: DISPENSED
Start: 2019-02-27

## (undated) RX ORDER — GLYCOPYRROLATE 0.2 MG/ML
INJECTION, SOLUTION INTRAMUSCULAR; INTRAVENOUS
Status: DISPENSED
Start: 2019-03-27

## (undated) RX ORDER — NITROGLYCERIN 5 MG/ML
VIAL (ML) INTRAVENOUS
Status: DISPENSED
Start: 2019-02-26

## (undated) RX ORDER — PROPOFOL 10 MG/ML
INJECTION, EMULSION INTRAVENOUS
Status: DISPENSED
Start: 2019-03-27

## (undated) RX ORDER — CLOPIDOGREL 300 MG/1
TABLET, FILM COATED ORAL
Status: DISPENSED
Start: 2019-02-26

## (undated) RX ORDER — ONDANSETRON 2 MG/ML
INJECTION INTRAMUSCULAR; INTRAVENOUS
Status: DISPENSED
Start: 2019-03-27

## (undated) RX ORDER — DEXTROSE MONOHYDRATE 25 G/50ML
INJECTION, SOLUTION INTRAVENOUS
Status: DISPENSED
Start: 2019-02-27

## (undated) RX ORDER — FENTANYL CITRATE 50 UG/ML
INJECTION, SOLUTION INTRAMUSCULAR; INTRAVENOUS
Status: DISPENSED
Start: 2019-02-27

## (undated) RX ORDER — LIDOCAINE HYDROCHLORIDE 40 MG/ML
SOLUTION TOPICAL
Status: DISPENSED
Start: 2019-02-27

## (undated) RX ORDER — LIDOCAINE HYDROCHLORIDE 10 MG/ML
INJECTION, SOLUTION EPIDURAL; INFILTRATION; INTRACAUDAL; PERINEURAL
Status: DISPENSED
Start: 2019-02-26

## (undated) RX ORDER — NALOXONE HYDROCHLORIDE 0.4 MG/ML
INJECTION, SOLUTION INTRAMUSCULAR; INTRAVENOUS; SUBCUTANEOUS
Status: DISPENSED
Start: 2019-02-27

## (undated) RX ORDER — FENTANYL CITRATE 50 UG/ML
INJECTION, SOLUTION INTRAMUSCULAR; INTRAVENOUS
Status: DISPENSED
Start: 2019-03-27

## (undated) RX ORDER — FENTANYL CITRATE 50 UG/ML
INJECTION, SOLUTION INTRAMUSCULAR; INTRAVENOUS
Status: DISPENSED
Start: 2019-02-26

## (undated) RX ORDER — GLYCOPYRROLATE 0.2 MG/ML
INJECTION, SOLUTION INTRAMUSCULAR; INTRAVENOUS
Status: DISPENSED
Start: 2019-02-27

## (undated) RX ORDER — NEOSTIGMINE METHYLSULFATE 1 MG/ML
VIAL (ML) INJECTION
Status: DISPENSED
Start: 2019-03-27

## (undated) RX ORDER — ADENOSINE 3 MG/ML
INJECTION, SOLUTION INTRAVENOUS
Status: DISPENSED
Start: 2019-02-26

## (undated) RX ORDER — LIDOCAINE HYDROCHLORIDE 20 MG/ML
INJECTION, SOLUTION EPIDURAL; INFILTRATION; INTRACAUDAL; PERINEURAL
Status: DISPENSED
Start: 2019-03-27